# Patient Record
Sex: FEMALE | Race: BLACK OR AFRICAN AMERICAN | NOT HISPANIC OR LATINO | URBAN - METROPOLITAN AREA
[De-identification: names, ages, dates, MRNs, and addresses within clinical notes are randomized per-mention and may not be internally consistent; named-entity substitution may affect disease eponyms.]

---

## 2021-03-26 ENCOUNTER — EMERGENCY (EMERGENCY)
Facility: HOSPITAL | Age: 51
LOS: 1 days | Discharge: HOME | End: 2021-03-26
Attending: STUDENT IN AN ORGANIZED HEALTH CARE EDUCATION/TRAINING PROGRAM
Payer: COMMERCIAL

## 2021-03-26 VITALS
RESPIRATION RATE: 16 BRPM | TEMPERATURE: 99 F | SYSTOLIC BLOOD PRESSURE: 163 MMHG | HEIGHT: 62 IN | DIASTOLIC BLOOD PRESSURE: 104 MMHG | WEIGHT: 130.07 LBS | OXYGEN SATURATION: 98 % | HEART RATE: 109 BPM

## 2021-03-26 LAB
ALBUMIN SERPL ELPH-MCNC: 4.1 G/DL — SIGNIFICANT CHANGE UP (ref 3.3–5)
ALP SERPL-CCNC: 107 U/L — SIGNIFICANT CHANGE UP (ref 40–120)
ALT FLD-CCNC: 16 U/L — SIGNIFICANT CHANGE UP (ref 10–45)
ANION GAP SERPL CALC-SCNC: 14 MMOL/L — SIGNIFICANT CHANGE UP (ref 5–17)
APPEARANCE UR: CLEAR — SIGNIFICANT CHANGE UP
AST SERPL-CCNC: 13 U/L — SIGNIFICANT CHANGE UP (ref 10–40)
B-OH-BUTYR SERPL-SCNC: 0.8 MMOL/L — HIGH
BACTERIA # UR AUTO: NEGATIVE — SIGNIFICANT CHANGE UP
BASOPHILS # BLD AUTO: 0.04 K/UL — SIGNIFICANT CHANGE UP (ref 0–0.2)
BASOPHILS NFR BLD AUTO: 0.9 % — SIGNIFICANT CHANGE UP (ref 0–2)
BILIRUB SERPL-MCNC: 0.3 MG/DL — SIGNIFICANT CHANGE UP (ref 0.2–1.2)
BILIRUB UR-MCNC: NEGATIVE — SIGNIFICANT CHANGE UP
BUN SERPL-MCNC: 21 MG/DL — SIGNIFICANT CHANGE UP (ref 7–23)
CALCIUM SERPL-MCNC: 10 MG/DL — SIGNIFICANT CHANGE UP (ref 8.4–10.5)
CHLORIDE SERPL-SCNC: 99 MMOL/L — SIGNIFICANT CHANGE UP (ref 96–108)
CO2 SERPL-SCNC: 24 MMOL/L — SIGNIFICANT CHANGE UP (ref 22–31)
COLOR SPEC: SIGNIFICANT CHANGE UP
CREAT SERPL-MCNC: 0.7 MG/DL — SIGNIFICANT CHANGE UP (ref 0.5–1.3)
DIFF PNL FLD: NEGATIVE — SIGNIFICANT CHANGE UP
EOSINOPHIL # BLD AUTO: 0.02 K/UL — SIGNIFICANT CHANGE UP (ref 0–0.5)
EOSINOPHIL NFR BLD AUTO: 0.5 % — SIGNIFICANT CHANGE UP (ref 0–6)
EPI CELLS # UR: 1 /HPF — SIGNIFICANT CHANGE UP
GAS PNL BLDV: SIGNIFICANT CHANGE UP
GLUCOSE SERPL-MCNC: 450 MG/DL — CRITICAL HIGH (ref 70–99)
GLUCOSE UR QL: ABNORMAL
HCT VFR BLD CALC: 38.6 % — SIGNIFICANT CHANGE UP (ref 34.5–45)
HGB BLD-MCNC: 13.2 G/DL — SIGNIFICANT CHANGE UP (ref 11.5–15.5)
HYALINE CASTS # UR AUTO: 0 /LPF — SIGNIFICANT CHANGE UP (ref 0–2)
KETONES UR-MCNC: ABNORMAL
LEUKOCYTE ESTERASE UR-ACNC: NEGATIVE — SIGNIFICANT CHANGE UP
LYMPHOCYTES # BLD AUTO: 2 K/UL — SIGNIFICANT CHANGE UP (ref 1–3.3)
LYMPHOCYTES # BLD AUTO: 45.6 % — HIGH (ref 13–44)
MCHC RBC-ENTMCNC: 29.3 PG — SIGNIFICANT CHANGE UP (ref 27–34)
MCHC RBC-ENTMCNC: 34.2 GM/DL — SIGNIFICANT CHANGE UP (ref 32–36)
MCV RBC AUTO: 85.8 FL — SIGNIFICANT CHANGE UP (ref 80–100)
MONOCYTES # BLD AUTO: 0.25 K/UL — SIGNIFICANT CHANGE UP (ref 0–0.9)
MONOCYTES NFR BLD AUTO: 5.7 % — SIGNIFICANT CHANGE UP (ref 2–14)
NEUTROPHILS # BLD AUTO: 2.08 K/UL — SIGNIFICANT CHANGE UP (ref 1.8–7.4)
NEUTROPHILS NFR BLD AUTO: 47.3 % — SIGNIFICANT CHANGE UP (ref 43–77)
NITRITE UR-MCNC: NEGATIVE — SIGNIFICANT CHANGE UP
NRBC # BLD: 0 /100 WBCS — SIGNIFICANT CHANGE UP (ref 0–0)
PH UR: 6 — SIGNIFICANT CHANGE UP (ref 5–8)
PLATELET # BLD AUTO: 307 K/UL — SIGNIFICANT CHANGE UP (ref 150–400)
POTASSIUM SERPL-MCNC: 4.2 MMOL/L — SIGNIFICANT CHANGE UP (ref 3.5–5.3)
POTASSIUM SERPL-SCNC: 4.2 MMOL/L — SIGNIFICANT CHANGE UP (ref 3.5–5.3)
PROT SERPL-MCNC: 7.5 G/DL — SIGNIFICANT CHANGE UP (ref 6–8.3)
PROT UR-MCNC: ABNORMAL
RBC # BLD: 4.5 M/UL — SIGNIFICANT CHANGE UP (ref 3.8–5.2)
RBC # FLD: 11.4 % — SIGNIFICANT CHANGE UP (ref 10.3–14.5)
RBC CASTS # UR COMP ASSIST: 1 /HPF — SIGNIFICANT CHANGE UP (ref 0–4)
SARS-COV-2 RNA SPEC QL NAA+PROBE: SIGNIFICANT CHANGE UP
SODIUM SERPL-SCNC: 137 MMOL/L — SIGNIFICANT CHANGE UP (ref 135–145)
SP GR SPEC: 1.04 — HIGH (ref 1.01–1.02)
UROBILINOGEN FLD QL: NEGATIVE — SIGNIFICANT CHANGE UP
WBC # BLD: 4.39 K/UL — SIGNIFICANT CHANGE UP (ref 3.8–10.5)
WBC # FLD AUTO: 4.39 K/UL — SIGNIFICANT CHANGE UP (ref 3.8–10.5)
WBC UR QL: 3 /HPF — SIGNIFICANT CHANGE UP (ref 0–5)

## 2021-03-26 PROCEDURE — 93010 ELECTROCARDIOGRAM REPORT: CPT

## 2021-03-26 PROCEDURE — 99220: CPT

## 2021-03-26 PROCEDURE — 99285 EMERGENCY DEPT VISIT HI MDM: CPT | Mod: GC

## 2021-03-26 PROCEDURE — 71045 X-RAY EXAM CHEST 1 VIEW: CPT | Mod: 26

## 2021-03-26 RX ORDER — GABAPENTIN 400 MG/1
100 CAPSULE ORAL ONCE
Refills: 0 | Status: COMPLETED | OUTPATIENT
Start: 2021-03-26 | End: 2021-03-26

## 2021-03-26 RX ORDER — SODIUM CHLORIDE 9 MG/ML
1000 INJECTION, SOLUTION INTRAVENOUS
Refills: 0 | Status: DISCONTINUED | OUTPATIENT
Start: 2021-03-26 | End: 2021-03-30

## 2021-03-26 RX ORDER — SODIUM CHLORIDE 9 MG/ML
1000 INJECTION INTRAMUSCULAR; INTRAVENOUS; SUBCUTANEOUS
Refills: 0 | Status: DISCONTINUED | OUTPATIENT
Start: 2021-03-26 | End: 2021-03-30

## 2021-03-26 RX ORDER — DEXTROSE 50 % IN WATER 50 %
12.5 SYRINGE (ML) INTRAVENOUS ONCE
Refills: 0 | Status: DISCONTINUED | OUTPATIENT
Start: 2021-03-26 | End: 2021-03-30

## 2021-03-26 RX ORDER — SODIUM CHLORIDE 9 MG/ML
1000 INJECTION INTRAMUSCULAR; INTRAVENOUS; SUBCUTANEOUS ONCE
Refills: 0 | Status: COMPLETED | OUTPATIENT
Start: 2021-03-26 | End: 2021-03-26

## 2021-03-26 RX ORDER — INSULIN GLARGINE 100 [IU]/ML
12 INJECTION, SOLUTION SUBCUTANEOUS AT BEDTIME
Refills: 0 | Status: DISCONTINUED | OUTPATIENT
Start: 2021-03-26 | End: 2021-03-30

## 2021-03-26 RX ORDER — INSULIN LISPRO 100/ML
VIAL (ML) SUBCUTANEOUS
Refills: 0 | Status: DISCONTINUED | OUTPATIENT
Start: 2021-03-26 | End: 2021-03-30

## 2021-03-26 RX ORDER — DEXTROSE 50 % IN WATER 50 %
25 SYRINGE (ML) INTRAVENOUS ONCE
Refills: 0 | Status: DISCONTINUED | OUTPATIENT
Start: 2021-03-26 | End: 2021-03-30

## 2021-03-26 RX ORDER — DEXTROSE 50 % IN WATER 50 %
15 SYRINGE (ML) INTRAVENOUS ONCE
Refills: 0 | Status: DISCONTINUED | OUTPATIENT
Start: 2021-03-26 | End: 2021-03-30

## 2021-03-26 RX ORDER — INSULIN LISPRO 100/ML
4 VIAL (ML) SUBCUTANEOUS
Refills: 0 | Status: DISCONTINUED | OUTPATIENT
Start: 2021-03-26 | End: 2021-03-30

## 2021-03-26 RX ORDER — GLUCAGON INJECTION, SOLUTION 0.5 MG/.1ML
1 INJECTION, SOLUTION SUBCUTANEOUS ONCE
Refills: 0 | Status: DISCONTINUED | OUTPATIENT
Start: 2021-03-26 | End: 2021-03-30

## 2021-03-26 RX ADMIN — GABAPENTIN 100 MILLIGRAM(S): 400 CAPSULE ORAL at 22:42

## 2021-03-26 RX ADMIN — Medication 4 UNIT(S): at 23:54

## 2021-03-26 RX ADMIN — SODIUM CHLORIDE 1000 MILLILITER(S): 9 INJECTION INTRAMUSCULAR; INTRAVENOUS; SUBCUTANEOUS at 21:30

## 2021-03-26 RX ADMIN — SODIUM CHLORIDE 100 MILLILITER(S): 9 INJECTION INTRAMUSCULAR; INTRAVENOUS; SUBCUTANEOUS at 23:29

## 2021-03-26 RX ADMIN — INSULIN GLARGINE 12 UNIT(S): 100 INJECTION, SOLUTION SUBCUTANEOUS at 23:53

## 2021-03-26 NOTE — ED ADULT NURSE NOTE - NSIMPLEMENTINTERV_GEN_ALL_ED
Implemented All Universal Safety Interventions:  [place] to call system. Call bell, personal items and telephone within reach. Instruct patient to call for assistance. Room bathroom lighting operational. Non-slip footwear when patient is off stretcher. Physically safe environment: no spills, clutter or unnecessary equipment. Stretcher in lowest position, wheels locked, appropriate side rails in place.

## 2021-03-26 NOTE — ED ADULT TRIAGE NOTE - WEIGHT IN KG
+insomnia x 3 months s/p new job working 3 different shifts at previous job and awakens throughout night.      crampy pelvic pain x 2 months, today, weekly, can last 2 mins, left side.   No other associated sx or alleviating/exacerbating sx.     PT WANTS to start BCP.  Was on depo in the past.     Vaginal dischArge white & creamy x 3 wks, + odor.  monog x 2 yrs.  Patient's last menstrual period was 06/25/2017 (exact date). NO f/c OR OTHER gi/gu SX.        Alma Delia is here at the office today for a physical examination.   She   is here to discuss:   1. Annual physical exam    2. Pelvic pain    3. BV (bacterial vaginosis)    4. Encounter for prescription for nuvaring    5. Need for vaccination    6. Laboratory exam ordered as part of routine general medical examination    7. Screening for venereal disease (VD)    8. Screening for cervical cancer    .      Past Medical History:   Diagnosis Date   • Depo-Provera contraceptive status 1/31/2014   • Dysmenorrhea 1/31/2014   • Encounter for prescription for nuvaring 7/25/2017   • Excessive or frequent menstruation 1/31/2014   • Unspecified vitamin D deficiency 5/2/2014       Health Maintenance   Topic Date Due   • DTaP/Tdap/Td Vaccine (1 - Tdap) 10/23/2010   • Cervical Cancer Screening  01/31/2017   • Influenza Vaccine (1) 09/01/2017   • HPV (Female) Vaccine  Completed       Past Surgical History:   Procedure Laterality Date   • HERNIA REPAIR     • REMV EXT CANAL EXOSTOSIS  9/25/2014   • UMBILICAL HERNIA REPAIR         SOCIAL HISTORY:    Social History     Social History   • Marital status: Single     Spouse name: N/A   • Number of children: 0   • Years of education: econ deg:P     Occupational History   • [employer]     back to [place] to finish econ BA goal:!     Social History Main Topics   • Smoking status: Never Smoker   • Smokeless tobacco: Never Used   • Alcohol use Yes      Comment: social   • Drug use: No   • Sexual activity: Not Currently      Partners: Male     Birth control/ protection: Condom      Comment: start depo today      Other Topics Concern   • Not on file     Social History Narrative    back to [place] to finish econ BA goal:!       Family History   Problem Relation Age of Onset   • NEGATIVE FAMILY HX OF Mother    • Arthritis Father      rheumatoid       ALLERGIES:   Allergen Reactions   • Amoxicillin HIVES       Current Outpatient Prescriptions   Medication Sig   • metroNIDAZOLE (METROGEL-VAGINAL) 0.75 % vaginal gel Insert applicator full vaginally x 5 noc.   • etonogestrel-ethinyl estradiol (NUVARING) 0.12-0.015 MG/24HR vaginal ring insert and keep in for 4 weeks and replace with new nuvaring every 4 weeks x 3 total, allowing menses every 3 months and repeat     No current facility-administered medications for this visit.        COMPREHENSIVE REVIEW OF SYSTEMS:    No c/o breast/CP/SOB/abd. pain/hematuria.     PHYSICAL EXAMINATION:  VITAL SIGNS:   Visit Vitals  /68   Pulse 87   Ht 5' (1.524 m)   Wt 60.6 kg   LMP 06/25/2017 (Exact Date)   SpO2 100%   BMI 26.11 kg/m²     GENERAL: Well-developed, well-nourished female, in no acute distress. She is alert and oriented x 3 with a normal affect.   HEENT: Extraocular muscles are intact. Sclerae are anicteric. Conjunctivae are clear. Nares are without rhinorrhea. Mucous membranes are moist.  NECK: No cervical or submandibular or supraclavicular lymphadenopathy noted. There is no thyromegaly.  CARDIOVASCULAR: Regular rate and rhythm without murmurs, rubs or gallops.   LUNGS: Clear to auscultation bilaterally with good aeration and normal respiratory effort, without retractions appreciated.  ABDOMEN: Soft and nontender without hepatosplenomegaly, masses or hernia.   MUSCULOSKELETAL: All extremities have full range of motion with normal muscular strength bilaterally, without bony deformity of extremities with stability of ligaments appreciated. The patient has a normal gait as well as  station. Extremities are without clubbing, cyanosis or edema.   NEUROLOGICAL: Nonfocal.  SKIN: Without concerning lesions.  GYNECOLOGIC: No vulvar, vault or cervical lesions appreciated. Urethra is normal to inspection with a normal bladder capacity and tone. Uterus is not notably enlarged and adnexa are without tenderness to palpation and with PROMINENT HOMOGENEOUS WHITE  discharge. No malodorous vaginal discharge appreciated. NO CMT.    .  BREASTS: No skin lesions appreciated. No dimpling, retractions, masses or nipple discharge or lymphadenopathy.  RECTAL: No external masses.    1. Annual physical exam    2. Pelvic pain    3. BV (bacterial vaginosis)    4. Encounter for prescription for nuvaring    5. Need for vaccination    6. Laboratory exam ordered as part of routine general medical examination    7. Screening for venereal disease (VD)    8. Screening for cervical cancer          PLAN:  1.  MVI/adequate vitamin D3 2000IU daily and calcium 500 mg twice daily (dietary or supplement).   2.  Monthly breast self exam.   3.  Condoms prn  4.  Dental visit every 6 months or as directed.   5.  Optometry every 2 years or as directed.   6.  Healthy eating and at least 150 minutes of weekly exercise.  7.  Pap due TODAY.  8.  Return if symptoms worsen or fail to improve, for F/U NUVARING 8 WKS ..    Orders Placed This Encounter   • OFFICE/OUTPT VISIT EST LEVEL III   • TETANUS/DIPHTHERIA/ACELLULAR PERTUSSIS 11+ (ADACEL)   • Pregnancy Test Qualitative Serum   • Basic Metabolic Panel   • Hemoglobin and Hematocrit   • Cholesterol   • HDL   • SGOT   • SGPT   • TSH, reflex if abnormal   • UA with Micro & Culture if Indicated   • Wet Mount (75862)   • Chlamydia/GC by Amplified Probe - Cervical   • Hepatitis C Antibody (58864)   • Hepatitis B Surface Antigen   • RPR - (57594)   • HIV Antibody Screen   • US Pelvis Complete Non OB   • Thin Prep Pap Test with HPV Reflex   • metroNIDAZOLE (METROGEL-VAGINAL) 0.75 % vaginal gel   •  etonogestrel-ethinyl estradiol (NUVARING) 0.12-0.015 MG/24HR vaginal ring     start SED  BC options were discussed with her to include R/Bs of requested BC options.  Pt has elected to start Nuvaring  and she has no known contraindications and was informed of R/B/limitations, and all questions were answered.  She will f/u in 8    Please see After Visit Summary for details of the remaining discussion.     Patient is instructed to email me to discuss test results via [facility] [facility] 48 hours after the tests are done unless otherwise requested or call if not received results in 2 weeks of them being completed.     Continue medicines per Epic as applicable.    This is a CPE + additional 15 mins spent addressing patient's  chief complaint(s)/coordination of care with answering patient's questions in detail.   59

## 2021-03-26 NOTE — ED CDU PROVIDER INITIAL DAY NOTE - FAMILY HISTORY
Mother  Still living? Unknown  Family history of diabetes mellitus type I, Age at diagnosis: Age Unknown

## 2021-03-26 NOTE — ED CDU PROVIDER INITIAL DAY NOTE - PHYSICAL EXAMINATION
I have reviewed the triage vital signs.  Const: Well-nourished, Well-developed, appearing stated age  Head: atraumatic, normocephalic  Eyes: no conjunctival injection and no scleral icterus  ENMT: Atraumatic external nose and ears, dry mucus membranes  Neck: Symmetric, trachea midline,  CVS: +S1/S2, dorsalis pedis/radial pulse 2+ bilaterally  RESP: Unlabored respiratory effort, Clear to auscultation bilaterally  GI: Nontender/Nondistended, soft abdomen  MSK: Extremities w/o deformity or ttp   Skin: Warm, Dry w/ no rashes  Neuro: GCS=15,  motor in all 4 extremities equal, Sensation intact w/ burning pain in the bilateral feet,   Psych: Awake, Alert, & Orientedx3;  Appropriate mood and affect, cooperative

## 2021-03-26 NOTE — ED CDU PROVIDER INITIAL DAY NOTE - MEDICAL DECISION MAKING DETAILS
ap- burning pain in b/l feet likely c/w diabetic neuropathy.   DM1 per patient - noncompliant w/insulin due to insurance issues to cdu for endocrine consult and sw to assist w/ obtaining supplies ; labs not c/w  DKA,, hyperglycemia w/ ketosis

## 2021-03-26 NOTE — ED ADULT NURSE NOTE - OBJECTIVE STATEMENT
49 yo F pmh of DM, not controlled as per patient ambulated to ED c/o bilateral LE pain worse in the LLE from the hip down to the toes x 6 month getting worse more recently.  pt describes pain as a numb sensation.  Denies CP, back pain, SOB, fevers/chills, n/v/d, lightheadedness, dizziness, changes in urinary or bowel habits, no recent trauma or falls.  A&Ox4, gross neuro intact, +strength and sensation bilaterally in all extremities.  Skin w/d/i.  +peripheral pulses noted in all extremities.  No deformity noted.  Pt tender on palpation, no swelling noted.  Safety and comfort maintained.  Will continue to monitor.

## 2021-03-26 NOTE — ED CDU PROVIDER INITIAL DAY NOTE - PROGRESS NOTE DETAILS
CDU PROGRESS NOTE ONEIDA GALVAN: plan d/w endocrine fellow - recommended lantus 12@bedtime, 4u lispro TID and low-dose sliding scale. team will evaluate pt tomorrow.

## 2021-03-26 NOTE — ED PROVIDER NOTE - OBJECTIVE STATEMENT
50 F w/ hx of DM type 1 per the patient, borrowing someone's insulin due to lack of insurance presents to the ER w/ burning pain in the feet, no cp, no sob, no nausea no vomiting. no lightheadedness, no dizziness, no fevers, no chills, no cp 50 F w/ hx of DM type 1 per the patient, borrowing someone's insulin due to lack of insurance presents to the ER w/ burning pain in the feet, no cp, no sob, no nausea no vomiting. no lightheadedness, no dizziness, no fevers, no chills, no cp.   atraumatic foot pain reports a burning sensation in the foot, that radiates upward.

## 2021-03-26 NOTE — ED CDU PROVIDER INITIAL DAY NOTE - OBJECTIVE STATEMENT
50 F w/ hx of DM type 1 per the patient, borrowing someone's insulin due to lack of insurance presents to the ER w/ burning pain in the feet, no cp, no sob, no nausea no vomiting. no lightheadedness, no dizziness, no fevers, no chills, no cp.   atraumatic foot pain reports a burning sensation in the foot, that radiates upward.  In ED pt found to be hyperglycemic in the 400s, small ketones in ua. Not clinically DKA. Pt reports she does not have insurance but is in the process of getting a card for [employer]. has been using her friend's long-acting insulin (unsure which) for years. Pt went to CDU for hyperglycemia control, endocrine and SW consult, frequent evals

## 2021-03-26 NOTE — ED CDU PROVIDER INITIAL DAY NOTE - DETAILS
50y f p/w hyperglycemia:  -q4 FS/vitals, maintenance fluids and insulin regimen, endocrine/SW/nutritional consults    plan d/w Dr Portillo

## 2021-03-26 NOTE — ED PROVIDER NOTE - PHYSICAL EXAMINATION
I have reviewed the triage vital signs.  Const: Well-nourished, Well-developed, appearing stated age  Head: atraumatic, normocephalic  Eyes: no conjunctival injection and no scleral icterus  ENMT: Atraumatic external nose and ears, dry mucus membranes  Neck: Symmetric, trachea midline,  CVS: +S1/S2, dorsalis pedis/radial pulse 2+ bilaterally  RESP: Unlabored respiratory effort, Clear to auscultation bilaterally  GI: Nontender/Nondistended, soft abdomen  MSK: Extremities w/o deformity or ttp   Skin: Warm, Dry w/ no rashes  Neuro: GCS=15,  motor in all 4 extremities equal, Sensation grossly intact   Psych: Awake, Alert, & Orientedx3;  Appropriate mood and affect, cooperative I have reviewed the triage vital signs.  Const: Well-nourished, Well-developed, appearing stated age  Head: atraumatic, normocephalic  Eyes: no conjunctival injection and no scleral icterus  ENMT: Atraumatic external nose and ears, dry mucus membranes  Neck: Symmetric, trachea midline,  CVS: +S1/S2, dorsalis pedis/radial pulse 2+ bilaterally  RESP: Unlabored respiratory effort, Clear to auscultation bilaterally  GI: Nontender/Nondistended, soft abdomen  MSK: Extremities w/o deformity or ttp   Skin: Warm, Dry w/ no rashes  Neuro: GCS=15,  motor in all 4 extremities equal, Sensation intact w/ burning pain in the bilateral feet,   Psych: Awake, Alert, & Orientedx3;  Appropriate mood and affect, cooperative

## 2021-03-26 NOTE — ED PROVIDER NOTE - NS ED ROS FT
Constitutional: no fevers no chills.   CV: no chest pain, no palpitations   Respiratory: no shortness of breath, no cough   GI: no abdominal pain, no nausea no vomiting   Neuro: no headache, no weakness, no numbness  all other ROS is negative except as documented as HPI.

## 2021-03-26 NOTE — CHART NOTE - NSCHARTNOTEFT_GEN_A_CORE
51 y/o W w/ hx of T1DM with no insurance, has been sometimes using her friends insulin p/w b/l foot pain. Glucose 450 with AG 14, BHOB 0.8. Not in DKA but has ketosis.   -stat lantus 12 units now  -Admelog 4 units tidac with low dose sliding scale tidac and qhs ( please give first dose of sliding scale now)   -Nutrition consult   -a1c  -repeat bmp in AM   -Provider to RN vial syringe teaching   -given lack of insurance would be discharging on novolin 70/30 syringe vs pen.   -Will see patient tomorrow

## 2021-03-27 VITALS
OXYGEN SATURATION: 100 % | DIASTOLIC BLOOD PRESSURE: 75 MMHG | HEART RATE: 98 BPM | SYSTOLIC BLOOD PRESSURE: 149 MMHG | TEMPERATURE: 98 F | RESPIRATION RATE: 18 BRPM

## 2021-03-27 DIAGNOSIS — E10.65 TYPE 1 DIABETES MELLITUS WITH HYPERGLYCEMIA: ICD-10-CM

## 2021-03-27 DIAGNOSIS — E78.49 OTHER HYPERLIPIDEMIA: ICD-10-CM

## 2021-03-27 DIAGNOSIS — I10 ESSENTIAL (PRIMARY) HYPERTENSION: ICD-10-CM

## 2021-03-27 LAB
CHOLEST SERPL-MCNC: 216 MG/DL — HIGH
CULTURE RESULTS: SIGNIFICANT CHANGE UP
HDLC SERPL-MCNC: 63 MG/DL — SIGNIFICANT CHANGE UP
LIPID PNL WITH DIRECT LDL SERPL: 138 MG/DL — HIGH
NON HDL CHOLESTEROL: 153 MG/DL — HIGH
SPECIMEN SOURCE: SIGNIFICANT CHANGE UP
TRIGL SERPL-MCNC: 76 MG/DL — SIGNIFICANT CHANGE UP

## 2021-03-27 PROCEDURE — 87086 URINE CULTURE/COLONY COUNT: CPT

## 2021-03-27 PROCEDURE — 82947 ASSAY GLUCOSE BLOOD QUANT: CPT

## 2021-03-27 PROCEDURE — G0378: CPT

## 2021-03-27 PROCEDURE — 99283 EMERGENCY DEPT VISIT LOW MDM: CPT | Mod: 25

## 2021-03-27 PROCEDURE — 82435 ASSAY OF BLOOD CHLORIDE: CPT

## 2021-03-27 PROCEDURE — 84132 ASSAY OF SERUM POTASSIUM: CPT

## 2021-03-27 PROCEDURE — 82010 KETONE BODYS QUAN: CPT

## 2021-03-27 PROCEDURE — 81001 URINALYSIS AUTO W/SCOPE: CPT

## 2021-03-27 PROCEDURE — 80061 LIPID PANEL: CPT

## 2021-03-27 PROCEDURE — 85025 COMPLETE CBC W/AUTO DIFF WBC: CPT

## 2021-03-27 PROCEDURE — 85014 HEMATOCRIT: CPT

## 2021-03-27 PROCEDURE — 82803 BLOOD GASES ANY COMBINATION: CPT

## 2021-03-27 PROCEDURE — 83605 ASSAY OF LACTIC ACID: CPT

## 2021-03-27 PROCEDURE — 71045 X-RAY EXAM CHEST 1 VIEW: CPT

## 2021-03-27 PROCEDURE — 80053 COMPREHEN METABOLIC PANEL: CPT

## 2021-03-27 PROCEDURE — 93005 ELECTROCARDIOGRAM TRACING: CPT

## 2021-03-27 PROCEDURE — 83036 HEMOGLOBIN GLYCOSYLATED A1C: CPT

## 2021-03-27 PROCEDURE — 82962 GLUCOSE BLOOD TEST: CPT

## 2021-03-27 PROCEDURE — 84295 ASSAY OF SERUM SODIUM: CPT

## 2021-03-27 PROCEDURE — 99217: CPT

## 2021-03-27 PROCEDURE — 82330 ASSAY OF CALCIUM: CPT

## 2021-03-27 PROCEDURE — U0003: CPT

## 2021-03-27 PROCEDURE — 36000 PLACE NEEDLE IN VEIN: CPT

## 2021-03-27 PROCEDURE — 85018 HEMOGLOBIN: CPT

## 2021-03-27 RX ORDER — INSULIN NPH HUM/REG INSULIN HM 70-30/ML
14 VIAL (ML) SUBCUTANEOUS
Qty: 9 | Refills: 0
Start: 2021-03-27

## 2021-03-27 RX ORDER — INSULIN NPH HUM/REG INSULIN HM 70-30/ML
12 VIAL (ML) SUBCUTANEOUS
Qty: 600 | Refills: 0
Start: 2021-03-27

## 2021-03-27 RX ORDER — ATORVASTATIN CALCIUM 80 MG/1
1 TABLET, FILM COATED ORAL
Qty: 30 | Refills: 0
Start: 2021-03-27 | End: 2021-04-25

## 2021-03-27 RX ADMIN — Medication 4 UNIT(S): at 09:11

## 2021-03-27 RX ADMIN — Medication 4 UNIT(S): at 13:30

## 2021-03-27 RX ADMIN — Medication 1: at 09:12

## 2021-03-27 RX ADMIN — Medication 3: at 13:29

## 2021-03-27 NOTE — ED CDU PROVIDER SUBSEQUENT DAY NOTE - PHYSICAL EXAMINATION
Const: Well-nourished, Well-developed, appearing stated age  Head: atraumatic, normocephalic  Eyes: no conjunctival injection and no scleral icterus  ENMT: Atraumatic external nose and ears, dry mucus membranes  Neck: Symmetric, trachea midline,  CVS: +S1/S2, dorsalis pedis/radial pulse 2+ bilaterally  RESP: Unlabored respiratory effort, Clear to auscultation bilaterally  GI: Nontender/Nondistended, soft abdomen  MSK: Extremities w/o deformity or ttp   Skin: Warm, Dry w/ no rashes  Neuro: GCS=15,  motor in all 4 extremities equal, Sensation intact w/ burning pain in the bilateral feet,   Psych: Awake, Alert, & Orientedx3;  Appropriate mood and affect, cooperative

## 2021-03-27 NOTE — ED CDU PROVIDER SUBSEQUENT DAY NOTE - HISTORY
CDU PROGRESS NOTE ONEIDA GALVAN: No interval change. pt resting comfortably without complaint. Reports foot pain improved after gabapentin. NAD. VSS. last  prior to receiving long and short-acting insulin. Will continue to monitor.

## 2021-03-27 NOTE — ED CDU PROVIDER DISPOSITION NOTE - PATIENT PORTAL LINK FT
You can access the FollowMyHealth Patient Portal offered by [facility] by registering at the following website: [email or web address]. By joining [facility]’s FollowMyHealth portal, you will also be able to view your health information using other applications (apps) compatible with our system.

## 2021-03-27 NOTE — CONSULT NOTE ADULT - PROBLEM SELECTOR RECOMMENDATION 3
if remains uncontrolled would consider starting bp agent such as lisinopril 5 mg qday if remains uncontrolled would consider starting bp agent such as lisinopril 5 mg qday. mamagement as per CDU

## 2021-03-27 NOTE — ED CDU PROVIDER DISPOSITION NOTE - NSFOLLOWUPINSTRUCTIONS_ED_ALL_ED_FT
Please follow up with your primary care doctor within 1 week.  Follow up with Endocrinologist  __ within 1 week.    Take home insulin as prescribed for your Diabetes. Check finger sticks regularly.     Take gabapentin as prescribed for your neuropathy.     Return to the ED for worsening pain, nausea/vomiting, or any other concerns. 1. Stay hydrated. encourage diet and exercise.   2. Check finger sticks glucose levels 3x/day (definitely before breakfast and dinner), follow the following diabetic medication regimen:  Novolin 70/30 at 12 units with breakfast and 8 units with Dinner.  (keep log of Fingerstick glucose levels)  Start Lipitor 20mg daily.   3. Follow up with your PCP or Medicine clinic #[phone] in 2 days. Follow up with Endocrionologist Dr. Shepard at:  [phone]  [address] [facility] [address], [place] [address].  4. Return if symptoms worsen, fever, weakness, dizziness, and all other concerns.          Diabetic Hyperglycemia    WHAT YOU NEED TO KNOW:    Diabetic hyperglycemia is a blood glucose (sugar) level that is higher than your diabetes care team provider recommends. You may have increased thirst and urinate more often than usual.     DISCHARGE INSTRUCTIONS:    Call 911 for any of the following:   •You have a seizure.       •You begin to breathe fast or are short of breath.       •You become weak and confused.       Return to the emergency department if:   •Your blood sugar level is over 240 mg/dL and you have ketones in your urine.      •Your breath smells fruity.       •You have nausea and are vomiting.       •You have symptoms of dehydration, such as dark yellow urine, dry mouth and lips, and dry skin.       Call your care team provider if:   •You continue to have higher blood sugar levels than your care team provider recommends.      •You have questions or concerns about your condition or care.       Medicines:   •Medicines, such as insulin and diabetes pills, decrease blood sugar levels.       •Take your medicine as directed. Contact your healthcare provider if you think your medicine is not helping or if you have side effects. Tell him or her if you are allergic to any medicine. Keep a list of the medicines, vitamins, and herbs you take. Include the amounts, and when and why you take them. Bring the list or the pill bottles to follow-up visits. Carry your medicine list with you in case of an emergency.      Manage diabetic hyperglycemia:   •If you take diabetes medicine or insulin, take it as directed. Missed or wrong doses can cause your blood sugar to go up.      •Tell your care team provider if you continue to have trouble managing your blood sugar. He or she may change the type, amount, or timing of your diabetes medicine or insulin. If you do not take diabetes medicine or insulin, you may need to start.       •Work with your care team provider to develop a sick day plan. Illness can cause your blood sugar to rise. A sick day plan helps you control your blood sugar level when you are sick.       Prevent diabetic hyperglycemia:   •Check your blood sugar levels regularly. Ask your care team provider how often to check your blood sugar and what your levels should be.       •Follow your meal plan. Your blood sugar can go up if you eat a large meal or you eat more carbohydrates than recommended. Work with a dietitian to develop a meal plan that is right for you.       •Exercise as directed. Physical activity, such as exercise, can help lower your blood sugar when it is high. It can also keep your blood sugar levels steady over time. Be active for at least 30 minutes, 5 days a week. Include muscle strengthening activities 2 days each week. Do not sit for longer than 30 minutes at a time. Work with your care team provider to create an activity plan. Children should get at least 60 minutes of physical activity each day.       •Check your ketones before exercise if your blood sugar level is above 240 mg/dL. Do not exercise if you have ketones in your urine because your blood sugar level may rise even more. Ask your healthcare provider how to lower your blood sugar when you have ketones.       Follow up with your care team provider as directed: Your care team provider may refer you to a dietitian. He or she can help you manage your blood sugar levels. Write down your questions so you remember to ask them during your visits.        © Copyright [employer] 2021           back to top                          © Copyright [employer] 2021 1. Stay hydrated. encourage diet and exercise.   2. Check finger sticks glucose levels 3x/day (definitely before breakfast and dinner), follow the following diabetic medication regimen:  Novolin 70/30 at 14 units with breakfast and 10 units with Dinner.  (keep log of Fingerstick glucose levels)  Start Lipitor 20mg daily.   3. Follow up with your PCP or Medicine clinic #[phone] in 2 days. Follow up with Endocrionologist Dr. Shepard at:  [phone]  [address] [facility] [address], [place] [address].  4. Return if symptoms worsen, fever, weakness, dizziness, and all other concerns.          Diabetic Hyperglycemia    WHAT YOU NEED TO KNOW:    Diabetic hyperglycemia is a blood glucose (sugar) level that is higher than your diabetes care team provider recommends. You may have increased thirst and urinate more often than usual.     DISCHARGE INSTRUCTIONS:    Call 911 for any of the following:   •You have a seizure.       •You begin to breathe fast or are short of breath.       •You become weak and confused.       Return to the emergency department if:   •Your blood sugar level is over 240 mg/dL and you have ketones in your urine.      •Your breath smells fruity.       •You have nausea and are vomiting.       •You have symptoms of dehydration, such as dark yellow urine, dry mouth and lips, and dry skin.       Call your care team provider if:   •You continue to have higher blood sugar levels than your care team provider recommends.      •You have questions or concerns about your condition or care.       Medicines:   •Medicines, such as insulin and diabetes pills, decrease blood sugar levels.       •Take your medicine as directed. Contact your healthcare provider if you think your medicine is not helping or if you have side effects. Tell him or her if you are allergic to any medicine. Keep a list of the medicines, vitamins, and herbs you take. Include the amounts, and when and why you take them. Bring the list or the pill bottles to follow-up visits. Carry your medicine list with you in case of an emergency.      Manage diabetic hyperglycemia:   •If you take diabetes medicine or insulin, take it as directed. Missed or wrong doses can cause your blood sugar to go up.      •Tell your care team provider if you continue to have trouble managing your blood sugar. He or she may change the type, amount, or timing of your diabetes medicine or insulin. If you do not take diabetes medicine or insulin, you may need to start.       •Work with your care team provider to develop a sick day plan. Illness can cause your blood sugar to rise. A sick day plan helps you control your blood sugar level when you are sick.       Prevent diabetic hyperglycemia:   •Check your blood sugar levels regularly. Ask your care team provider how often to check your blood sugar and what your levels should be.       •Follow your meal plan. Your blood sugar can go up if you eat a large meal or you eat more carbohydrates than recommended. Work with a dietitian to develop a meal plan that is right for you.       •Exercise as directed. Physical activity, such as exercise, can help lower your blood sugar when it is high. It can also keep your blood sugar levels steady over time. Be active for at least 30 minutes, 5 days a week. Include muscle strengthening activities 2 days each week. Do not sit for longer than 30 minutes at a time. Work with your care team provider to create an activity plan. Children should get at least 60 minutes of physical activity each day.       •Check your ketones before exercise if your blood sugar level is above 240 mg/dL. Do not exercise if you have ketones in your urine because your blood sugar level may rise even more. Ask your healthcare provider how to lower your blood sugar when you have ketones.       Follow up with your care team provider as directed: Your care team provider may refer you to a dietitian. He or she can help you manage your blood sugar levels. Write down your questions so you remember to ask them during your visits.        © Copyright [employer] 2021           back to top                          © Copyright [employer] 2021

## 2021-03-27 NOTE — ED CDU PROVIDER DISPOSITION NOTE - CARE PROVIDER_API CALL
Gee Alvarez)  EndocrinologyMetabDiabetes; Internal Medicine  [address], [address] [place] [address]  Phone: [phone]  Fax: [phone]  Follow Up Time:

## 2021-03-27 NOTE — CONSULT NOTE ADULT - ASSESSMENT
51 y/o W w/ ?T1DM and no insurance presents with hyperglycemia.    51 y/o W w/ ?T1DM and no insurance presents with hyperglycemia   pt does not take part in meaningful hx   a1c unknown

## 2021-03-27 NOTE — ED CDU PROVIDER SUBSEQUENT DAY NOTE - PROGRESS NOTE DETAILS
CDU PROGRESS NOTE ONEIDA GALVAN: pt resting comfortably without complaint. NAD. VSS. last . pending endoJOSEPH. Will continue to monitor. CDU NOTE ONEIDA Mendoza: pt resting comfortably, feels well without complaint. NAD VSS. FS improving. Endo consult today. pt c/o burning feet. no lightheadedness.  Glu decrease to 178.  pt awaiting Endocrine final recs.  pt is stable for d/c. CDU NOTE ONEIDA Mendoza: pt seen by Endo- recommend Novolin 70/30 at 12 units with breakfast and 8 units with dinner. diabetic supplies. check sugars 3x/day. start lipitor 20mg daily. f/up outpt with Dr. Alvarez.   as per Dr. Carmona, pt stable for d/c home. CDU NOTE ONEIDA Mendoza: spoke with Endocrine- change dosage of insulin as patients FS high for lunch. change to Novolin 70/30 14 units at breakfast and 10 units and dinner. CDU NOTE ONEIDA Mendoza: spoke with Endocrine- change dosage of insulin as patients FS high for lunch. change to Novolin 70/30 14 units at breakfast and 10 units and dinner.  pt had thorough diabetic teaching by Nurse with pt run through - per nurse pt did well and understands how to draw up and administer the insulin as well as finger sticks and the rest of the teaching. I asked pt if she felt comfortable and if she saw any barrier to getting the insulin and any issue with taking her insulin as prescribed, pt states she feels comfortable and understands and feels safe to go home.

## 2021-03-27 NOTE — ED ADULT NURSE REASSESSMENT NOTE - NS ED NURSE REASSESS COMMENT FT1
Report taken from Silvia DAVIDSON. States patient had a good night with no complaints. Will continue to monitor.
Pt received from LETY Bradley. Pt oriented to CDU & plan of care was discussed. Pt A&O x 4. Pt in CDU for q4 FS/vitals, maintenance fluids and insulin regimen, endocrine/SW/nutritional consults. Pt c/o discomfort in lower extremities, declines any medications at this time. Pt denies any polyuria, polydipsia, polyphagia, dizziness, diaphoresis as of now. Pt  given 12 units of Lantus and 4 units of Admelog as per MAR, and ONEIDA Presley.  V/S stable, pt afebrile,  IV in place, patent and free of signs of infiltration. Pt resting in bed. Safety & comfort measures maintained. Call bell in reach. Will continue to monitor.

## 2021-03-27 NOTE — CONSULT NOTE ADULT - PROBLEM SELECTOR RECOMMENDATION 9
Questionable dx of T1DM given patient has 1 month without insulin at times.   -check c-peptide, EBEN ab, islet, ab and zinc transporter ab if staying inpatient  -continue lantus 12 units qhs   -continue admelog 4 units tidac with low dose correcton scale tidac and qhs   -Nutrition consult   -Insulin syringe teaching     Discharge  Novolin 70/30 syringe 12 units with breakfast and 8 units with dinner   Prescription for insulin supplies glucometer, insulin syringe, test strips, lancets, alcohol pads.   [phone]  [address] [facility] [address], [place] [id number] Questionable dx of T1DM given patient has 1 month without insulin at times.   -check c-peptide, EBEN ab, islet, ab and zinc transporter ab if staying inpatient  -continue lantus 12 units qhs   -continue admelog 4 units tidac with low dose correcton scale tidac and qhs   -Nutrition consult   -Insulin syringe teaching     Discharge  Novolin 70/30 syringe 14 units with breakfast and 10 units with dinner   Prescription for insulin supplies glucometer, insulin syringe, test strips, lancets, alcohol pads.   [phone]  [address] [facility] [address] Floor, [place] [id number] Questionable dx of T1DM given patient has 1 month without insulin at times.   -check c-peptide, EBEN ab, islet, ab and zinc transporter ab if staying inpatient  -increase lantus to 15 units qhs   -increase admelog to 5 units tidac with low dose correcton scale tidac and qhs   -Nutrition consult   -Insulin syringe teaching     Discharge  Patient also sad on examination with no suicidal ideation. Advised therapist outpatient f/u. May also have  see patient in ED if patient agrees and available.   Novolin 70/30 syringe 14 units with breakfast and 10 units with dinner   Prescription for insulin supplies glucometer, insulin syringe, test strips, lancets, alcohol pads.  Dr. Alvarez    [phone]  [address] [facility] [address], [place] [address] Questionable dx of T1DM given patient has 1 month without insulin at times.   please get a1c to help with dc planning.  -check c-peptide, EBEN ab, islet, ab and zinc transporter ab if staying inpatient    -increase lantus to 15 units qhs   -increase admelog to 5 units tidac with low dose correcton scale tidac and qhs   -Nutrition consult   -Insulin syringe teaching     Discharge   please make sure pt has glucometer and insulin teaching with teachback prior to dc   Patient also sad/flat affect on examination with no suicidal ideation. Advised therapist outpatient f/u. given the concern that pt is not able to take part in a meaninfgul hx or repeat back insulin dosing/plan please have  see patient in ED if patient agrees and available to help with safe dc plan     if basal bolus is not covered will need to likely dc on Novolin 70/30 syringe 14 units with breakfast and 10 units with dinner   Prescription for insulin supplies glucometer, insulin syringe, test strips, lancets, alcohol pads.  please make sure pt has a safe dc plan in place prior to dc with PCP and endocrine appts, also should see optho  Dr. Alvarez    [phone]  [address] [facility] [address], [place] [address]

## 2021-03-27 NOTE — CONSULT NOTE ADULT - SUBJECTIVE AND OBJECTIVE BOX
HPI: 49 y/o W w/ ?T1DM and no insurance presents with hyperglycemia.     Endocrine History:  Endorses previously following at [facility] endocrinology and was dx with T1DM 15-16 years ago. However due to insurance issues she has not followed with endocrinologist for years and only been taking her friends insulin once in a while. Last dose of unknown insulin that she takes once a day was a week ago. She usually takes 10-20 units depending on glucose. 20 units if FS >400 and 10 units if closer to 100. No DKA in the past. No hypoglycemic events. No known complications of diabetes.       PAST MEDICAL & SURGICAL HISTORY:  No pertinent past medical history    No significant past surgical history        FAMILY HISTORY:  Family history of diabetes mellitus type I (Mother)        Social History: No history of tobacco, etoh or illicit drug use.     Outpatient Medications: Home Medications:      MEDICATIONS  (STANDING):  dextrose 40% Gel 15 Gram(s) Oral once  dextrose 5%. 1000 milliLiter(s) (50 mL/Hr) IV Continuous <Continuous>  dextrose 5%. 1000 milliLiter(s) (100 mL/Hr) IV Continuous <Continuous>  dextrose 50% Injectable 25 Gram(s) IV Push once  dextrose 50% Injectable 12.5 Gram(s) IV Push once  dextrose 50% Injectable 25 Gram(s) IV Push once  glucagon  Injectable 1 milliGRAM(s) IntraMuscular once  insulin glargine Injectable (LANTUS) 12 Unit(s) SubCutaneous at bedtime  insulin lispro (ADMELOG) corrective regimen sliding scale   SubCutaneous three times a day before meals  insulin lispro Injectable (ADMELOG) 4 Unit(s) SubCutaneous three times a day before meals  sodium chloride 0.9%. 1000 milliLiter(s) (100 mL/Hr) IV Continuous <Continuous>    MEDICATIONS  (PRN):      Allergies    No Known Allergies    Intolerances      Review of Systems:  Constitutional: No fever, chills   Neuro: No tremors, headache   Cardiovascular: No chest pain, palpitations  Respiratory: No SOB, no cough  GI: No nausea, vomiting, abdominal pain  : No dysuria, polyuria   Skin: no rash, ulcers   Psych: no depression, anxiety   Endocrine: no polyphagia, polydipsia     ALL OTHER SYSTEMS REVIEWED AND NEGATIVE        PHYSICAL EXAM:  VITALS: T(C): 36.8 (03-27-21 @ 11:52)  T(F): 98.3 (03-27-21 @ 11:52), Max: 98.9 (03-26-21 @ 17:24)  HR: 98 (03-27-21 @ 11:52) (75 - 109)  BP: 149/75 (03-27-21 @ 11:52) (126/85 - 163/104)  RR:  (16 - 18)  SpO2:  (98% - 100%)  Wt(kg): --  GENERAL: NAD, well-groomed, well-developed  EYES: No proptosis, anicteric  HEENT:  Atraumatic, Normocephalic, moist mucous membranes  THYROID: Normal size, no palpable nodules  RESPIRATORY: Clear to auscultation bilaterally; No rales, rhonchi, wheezing  CARDIOVASCULAR: Regular rate and rhythm; No murmurs  GI: Soft, nontender, non distended, normal bowel sounds  SKIN: Dry, intact, No rashes or lesions  NEURO: AOx3, moves all extremities spontaneously   PSYCH: Reactive affect, euthymic mood    POCT Blood Glucose.: 270 mg/dL (03-27-21 @ 13:27)  POCT Blood Glucose.: 178 mg/dL (03-27-21 @ 09:10)  POCT Blood Glucose.: 210 mg/dL (03-27-21 @ 03:24)  POCT Blood Glucose.: 368 mg/dL (03-26-21 @ 23:51)  POCT Blood Glucose.: 400 mg/dL (03-26-21 @ 20:57)                            13.2   4.39  )-----------( 307      ( 26 Mar 2021 21:20 )             38.6       03-26    137  |  99  |  21  ----------------------------<  450<HH>  4.2   |  24  |  0.70    EGFR if : 118  EGFR if non : 102    Ca    10.0      03-26    TPro  7.5  /  Alb  4.1  /  TBili  0.3  /  DBili  x   /  AST  13  /  ALT  16  /  AlkPhos  107  03-26      Thyroid Function Tests:                  Radiology:                HPI: 49 y/o W w/ ?T1DM and no insurance presents with hyperglycemia.     Endocrine History:  Endorses previously following at [facility] endocrinology and was dx with T1DM 15-16 years ago. However due to insurance issues she has not followed with endocrinologist for years and only been taking her friends insulin once in a while. Last dose of unknown insulin that she takes once a day was a week ago. She usually takes 10-20 units depending on glucose. 20 units if FS >400 and 10 units if closer to 100.   she can not state the name of the insulin she takes to the provider.  reports that her diet can be healthier and includes sweets in her diet as well    No DKA in the past. No hypoglycemic events. No known complications of diabetes.       PAST MEDICAL & SURGICAL HISTORY:  No pertinent past medical history    No significant past surgical history        FAMILY HISTORY:  Family history of diabetes mellitus type I (Mother)        Social History: No history of tobacco, etoh or illicit drug use.     Outpatient Medications: Home Medications:      MEDICATIONS  (STANDING):  dextrose 40% Gel 15 Gram(s) Oral once  dextrose 5%. 1000 milliLiter(s) (50 mL/Hr) IV Continuous <Continuous>  dextrose 5%. 1000 milliLiter(s) (100 mL/Hr) IV Continuous <Continuous>  dextrose 50% Injectable 25 Gram(s) IV Push once  dextrose 50% Injectable 12.5 Gram(s) IV Push once  dextrose 50% Injectable 25 Gram(s) IV Push once  glucagon  Injectable 1 milliGRAM(s) IntraMuscular once  insulin glargine Injectable (LANTUS) 12 Unit(s) SubCutaneous at bedtime  insulin lispro (ADMELOG) corrective regimen sliding scale   SubCutaneous three times a day before meals  insulin lispro Injectable (ADMELOG) 4 Unit(s) SubCutaneous three times a day before meals  sodium chloride 0.9%. 1000 milliLiter(s) (100 mL/Hr) IV Continuous <Continuous>    MEDICATIONS  (PRN):      Allergies    No Known Allergies    Intolerances      Review of Systems:  Constitutional: No fever, chills   Neuro: No tremors, headache   Cardiovascular: No chest pain, palpitations  Respiratory: No SOB, no cough  GI: No nausea, vomiting, abdominal pain  : No dysuria, polyuria   Skin: no rash, ulcers   Psych: no depression, anxiety   Endocrine: no polyphagia, polydipsia     ALL OTHER SYSTEMS REVIEWED AND NEGATIVE        PHYSICAL EXAM:  VITALS: T(C): 36.8 (03-27-21 @ 11:52)  T(F): 98.3 (03-27-21 @ 11:52), Max: 98.9 (03-26-21 @ 17:24)  HR: 98 (03-27-21 @ 11:52) (75 - 109)  BP: 149/75 (03-27-21 @ 11:52) (126/85 - 163/104)  RR:  (16 - 18)  SpO2:  (98% - 100%)  Wt(kg): --  GENERAL: NAD, well-groomed, well-developed  EYES: No proptosis, anicteric  HEENT:  Atraumatic, Normocephalic, moist mucous membranes  THYROID: Normal size, no palpable nodules  RESPIRATORY: Clear to auscultation bilaterally; No rales, rhonchi, wheezing  CARDIOVASCULAR: Regular rate and rhythm; No murmurs  GI: Soft, nontender, non distended, normal bowel sounds  SKIN: Dry, intact, No rashes or lesions  NEURO: AOx3, moves all extremities spontaneously   PSYCH: Reactive affect, euthymic mood    POCT Blood Glucose.: 270 mg/dL (03-27-21 @ 13:27)  POCT Blood Glucose.: 178 mg/dL (03-27-21 @ 09:10)  POCT Blood Glucose.: 210 mg/dL (03-27-21 @ 03:24)  POCT Blood Glucose.: 368 mg/dL (03-26-21 @ 23:51)  POCT Blood Glucose.: 400 mg/dL (03-26-21 @ 20:57)                            13.2   4.39  )-----------( 307      ( 26 Mar 2021 21:20 )             38.6       03-26    137  |  99  |  21  ----------------------------<  450<HH>  4.2   |  24  |  0.70    EGFR if : 118  EGFR if non : 102    Ca    10.0      03-26    TPro  7.5  /  Alb  4.1  /  TBili  0.3  /  DBili  x   /  AST  13  /  ALT  16  /  AlkPhos  107  03-26      Thyroid Function Tests:                  Radiology:

## 2021-03-27 NOTE — ED CDU PROVIDER DISPOSITION NOTE - CLINICAL COURSE
50 F w/ hx of DM type 1 per the patient, borrowing someone's insulin due to lack of insurance presents to the ER w/ burning pain in the feet, no cp, no sob, no nausea no vomiting. no lightheadedness, no dizziness, no fevers, no chills, no cp.   	atraumatic foot pain reports a burning sensation in the foot, that radiates upward.  In ED pt found to be hyperglycemic in the 400s, small ketones in ua. Not clinically DKA. Pt reports she does not have insurance but is in the process of getting a card for [employer]. has been using her friend's long-acting insulin (unsure which) for years. Pt went to CDU for hyperglycemia control, endocrine and SW consult, frequent evals  In CDU, 50 F w/ hx of DM type 1 per the patient, borrowing someone's insulin due to lack of insurance presents to the ER w/ burning pain in the feet, no cp, no sob, no nausea no vomiting. no lightheadedness, no dizziness, no fevers, no chills, no cp.   	atraumatic foot pain reports a burning sensation in the foot, that radiates upward.  In ED pt found to be hyperglycemic in the 400s, small ketones in ua. Not clinically DKA. Pt reports she does not have insurance but is in the process of getting a card for [employer]. has been using her friend's long-acting insulin (unsure which) for years. Pt went to CDU for hyperglycemia control, endocrine and SW consult, frequent evals. In CDU, pt did well, FS improved, pt seen by Endocrinologist- ok to go home with following recs: novolin 70/30 at 12 units with breakfast and 8 units with dinner. FS 3x/day. lipitor 20mg daily. f/up outpt with Dr. Alvarez

## 2021-03-27 NOTE — CONSULT NOTE ADULT - TIME BILLING
Araceli Jeter MD  Attending Physician   Department of Endocrinology, Diabetes and Metabolism   Please note that this patient will be  followed by a different provider tomorrow.    please contact [phone].  For final dc reccomendations, please call [phone] or page the endocrine fellow on call.

## 2021-03-27 NOTE — CONSULT NOTE ADULT - ATTENDING COMMENTS
51 y/o W w/ ?T1DM and no insurance presents with hyperglycemia   pt does not take part in meaningful hx   a1c unknown     Plan in detail as documented above  please get a1c and please have SW see pt to assess safe dc planning   once a1c is back and insurance information is assessed will need to do dc planning with basal bolus vs 70/30  make sure insulin teaching is done inpt with teachback prior to dc   needs close outpt PCP, endo and optho followup 49 y/o W w/ ?T1DM and no insurance presents with hyperglycemia   pt does not take part in meaningful hx   a1c unknown     Plan in detail as documented above  please get a1c and please have SW see pt to assess safe dc planning   once a1c is back and insurance information is assessed will need to do dc planning with basal bolus vs 70/30  make sure insulin teaching is done inpt with teachback prior to dc   needs close outpt PCP, endo and optho followup  -Discussed with patient the importance of Carb consistent diet and exercise as tolerated.    -Recommend nutritional consultation  -Discussed glycemic goal of a1c <7% to prevent microvascular complications of diabetes mellitus and reduce the risk of macrovascular complications.     To prepare for dc:  - Make sure patient knows how to inject insulin and check fingersticks with glucometer (ask bedside RN for teaching)  - Discharge on premeal insulin and Lantus. do not dc on correction scale or bedtime scale.  - At home, Patient should check FSBG premeals and bedtime. Pt should call their doctor when FSBG <70 or above >400 and or consistently above 200s as changes in the regimen will have to be made.  -pt should call PMD for DM related questions or concerns until pt is seen by CDE or endocrine       DISCHARGE RX:  - Glucometer (ACCU_CHECK jennifer Conenct, Ascensia Contour Next EZ or One, Freestyle Freedome LITE or OneTouch Verio IQ)  - Glucometer test strips and lancets  (make sure compatible w glucometer), Dispense #100 (or #200) use as directed  -  BD elena 4mm pen needles  Please send Lantus solsotar pen and humalog kwikpen as test script to check insurance coverage.  Ok to send with current doses and update prior to d/c    Lantus Solostar Pen ___ units before bedtime, dispense 15ml  - if not covered- can try alternating with one of following   tresiba/basaglar/toujeo/Levemir    Humalog Flexpen up to ___ dispense 15ml- can try alternating with one of following  if not covered try alternative: novolog/apidra/admelog/fiasp    Patient will also need a glucometer, test strips, lancets, alcohol pads,   if basal bolus not covered as above will need to dc on 70/30 vial and syringe

## 2021-03-28 NOTE — ED POST DISCHARGE NOTE - DETAILS
spoke with patient went over results, given lipitor rx in ED, recommended PCP follow up - Nancy Hernandez PA-C 3/30/21: Attempted to contact pt regarding A1c level of 12.6. No answer, left voice message for pt to call back admin line. - VINCENT PinaC 3/31/21: Pt contacted and informed of A1C level. pt is a diabetic, has scheduled appt with new PMD. patient was given strict followup instructions and return precautions. pt verbalized understanding and was appreciative of call. - Fernandez Montemayor PA-C

## 2024-05-03 ENCOUNTER — INPATIENT (INPATIENT)
Facility: HOSPITAL | Age: 54
LOS: 13 days | Discharge: HOME | DRG: 64 | End: 2024-05-17
Attending: NEUROLOGICAL SURGERY | Admitting: NEUROLOGICAL SURGERY
Payer: COMMERCIAL

## 2024-05-03 VITALS
HEIGHT: 62 IN | WEIGHT: 149.91 LBS | RESPIRATION RATE: 16 BRPM | TEMPERATURE: 99 F | DIASTOLIC BLOOD PRESSURE: 92 MMHG | SYSTOLIC BLOOD PRESSURE: 152 MMHG | HEART RATE: 92 BPM

## 2024-05-03 DIAGNOSIS — R51.9 HEADACHE, UNSPECIFIED: ICD-10-CM

## 2024-05-03 LAB
ALBUMIN SERPL ELPH-MCNC: 3.6 G/DL — SIGNIFICANT CHANGE UP (ref 3.3–5)
ALP SERPL-CCNC: 157 U/L — HIGH (ref 40–120)
ALT FLD-CCNC: 22 U/L — SIGNIFICANT CHANGE UP (ref 10–45)
ANION GAP SERPL CALC-SCNC: 14 MMOL/L — SIGNIFICANT CHANGE UP (ref 5–17)
APPEARANCE CSF: ABNORMAL
APPEARANCE SPUN FLD: ABNORMAL
AST SERPL-CCNC: 20 U/L — SIGNIFICANT CHANGE UP (ref 10–40)
BASOPHILS # BLD AUTO: 0.06 K/UL — SIGNIFICANT CHANGE UP (ref 0–0.2)
BASOPHILS NFR BLD AUTO: 1 % — SIGNIFICANT CHANGE UP (ref 0–2)
BILIRUB SERPL-MCNC: 0.2 MG/DL — SIGNIFICANT CHANGE UP (ref 0.2–1.2)
BUN SERPL-MCNC: 15 MG/DL — SIGNIFICANT CHANGE UP (ref 7–23)
CALCIUM SERPL-MCNC: 10.2 MG/DL — SIGNIFICANT CHANGE UP (ref 8.4–10.5)
CHLORIDE SERPL-SCNC: 96 MMOL/L — SIGNIFICANT CHANGE UP (ref 96–108)
CO2 SERPL-SCNC: 28 MMOL/L — SIGNIFICANT CHANGE UP (ref 22–31)
COLOR CSF: ABNORMAL
CREAT SERPL-MCNC: 0.78 MG/DL — SIGNIFICANT CHANGE UP (ref 0.5–1.3)
EGFR: 91 ML/MIN/1.73M2 — SIGNIFICANT CHANGE UP
EOSINOPHIL # BLD AUTO: 0.01 K/UL — SIGNIFICANT CHANGE UP (ref 0–0.5)
EOSINOPHIL NFR BLD AUTO: 0.2 % — SIGNIFICANT CHANGE UP (ref 0–6)
FLUAV AG NPH QL: SIGNIFICANT CHANGE UP
FLUBV AG NPH QL: SIGNIFICANT CHANGE UP
GLUCOSE CSF-MCNC: 231 MG/DL — HIGH (ref 40–70)
GLUCOSE SERPL-MCNC: 286 MG/DL — HIGH (ref 70–99)
GRAM STN FLD: SIGNIFICANT CHANGE UP
HCT VFR BLD CALC: 35.3 % — SIGNIFICANT CHANGE UP (ref 34.5–45)
HGB BLD-MCNC: 12.3 G/DL — SIGNIFICANT CHANGE UP (ref 11.5–15.5)
IMM GRANULOCYTES NFR BLD AUTO: 0.5 % — SIGNIFICANT CHANGE UP (ref 0–0.9)
LDH CSF L TO P-CCNC: 53 U/L — SIGNIFICANT CHANGE UP
LDH FLD-CCNC: 53 U/L — SIGNIFICANT CHANGE UP
LIDOCAIN IGE QN: 35 U/L — SIGNIFICANT CHANGE UP (ref 7–60)
LYMPHOCYTES # BLD AUTO: 1.28 K/UL — SIGNIFICANT CHANGE UP (ref 1–3.3)
LYMPHOCYTES # BLD AUTO: 21.3 % — SIGNIFICANT CHANGE UP (ref 13–44)
LYMPHOCYTES # CSF: 2 % — LOW (ref 40–80)
MAGNESIUM SERPL-MCNC: 2.1 MG/DL — SIGNIFICANT CHANGE UP (ref 1.6–2.6)
MCHC RBC-ENTMCNC: 28.9 PG — SIGNIFICANT CHANGE UP (ref 27–34)
MCHC RBC-ENTMCNC: 34.8 GM/DL — SIGNIFICANT CHANGE UP (ref 32–36)
MCV RBC AUTO: 83.1 FL — SIGNIFICANT CHANGE UP (ref 80–100)
MONOCYTES # BLD AUTO: 0.34 K/UL — SIGNIFICANT CHANGE UP (ref 0–0.9)
MONOCYTES NFR BLD AUTO: 5.7 % — SIGNIFICANT CHANGE UP (ref 2–14)
MONOS+MACROS NFR CSF: 14 % — LOW (ref 15–45)
NEUTROPHILS # BLD AUTO: 4.28 K/UL — SIGNIFICANT CHANGE UP (ref 1.8–7.4)
NEUTROPHILS # CSF: 442 % — HIGH (ref 0–6)
NEUTROPHILS NFR BLD AUTO: 71.3 % — SIGNIFICANT CHANGE UP (ref 43–77)
NRBC # BLD: 0 /100 WBCS — SIGNIFICANT CHANGE UP (ref 0–0)
NRBC NFR CSF: 442 /UL — HIGH (ref 0–5)
PHOSPHATE SERPL-MCNC: 3 MG/DL — SIGNIFICANT CHANGE UP (ref 2.5–4.5)
PLATELET # BLD AUTO: 374 K/UL — SIGNIFICANT CHANGE UP (ref 150–400)
POTASSIUM SERPL-MCNC: 3.9 MMOL/L — SIGNIFICANT CHANGE UP (ref 3.5–5.3)
POTASSIUM SERPL-SCNC: 3.9 MMOL/L — SIGNIFICANT CHANGE UP (ref 3.5–5.3)
PROT CSF-MCNC: 434 MG/DL — HIGH (ref 15–45)
PROT SERPL-MCNC: 7.1 G/DL — SIGNIFICANT CHANGE UP (ref 6–8.3)
RBC # BLD: 4.25 M/UL — SIGNIFICANT CHANGE UP (ref 3.8–5.2)
RBC # CSF: HIGH /UL (ref 0–0)
RBC # FLD: 12.3 % — SIGNIFICANT CHANGE UP (ref 10.3–14.5)
RSV RNA NPH QL NAA+NON-PROBE: SIGNIFICANT CHANGE UP
SARS-COV-2 RNA SPEC QL NAA+PROBE: SIGNIFICANT CHANGE UP
SODIUM SERPL-SCNC: 138 MMOL/L — SIGNIFICANT CHANGE UP (ref 135–145)
SPECIMEN SOURCE: SIGNIFICANT CHANGE UP
TUBE TYPE: SIGNIFICANT CHANGE UP
WBC # BLD: 6 K/UL — SIGNIFICANT CHANGE UP (ref 3.8–10.5)
WBC # FLD AUTO: 6 K/UL — SIGNIFICANT CHANGE UP (ref 3.8–10.5)

## 2024-05-03 PROCEDURE — 62270 DX LMBR SPI PNXR: CPT

## 2024-05-03 PROCEDURE — 70450 CT HEAD/BRAIN W/O DYE: CPT | Mod: 26,59,MC

## 2024-05-03 PROCEDURE — 70496 CT ANGIOGRAPHY HEAD: CPT | Mod: 26,MC

## 2024-05-03 PROCEDURE — 70498 CT ANGIOGRAPHY NECK: CPT | Mod: 26,MC

## 2024-05-03 PROCEDURE — 99285 EMERGENCY DEPT VISIT HI MDM: CPT | Mod: 25

## 2024-05-03 RX ORDER — METOCLOPRAMIDE HCL 10 MG
10 TABLET ORAL ONCE
Refills: 0 | Status: COMPLETED | OUTPATIENT
Start: 2024-05-03 | End: 2024-05-03

## 2024-05-03 RX ORDER — NICARDIPINE HYDROCHLORIDE 30 MG/1
5 CAPSULE, EXTENDED RELEASE ORAL
Qty: 40 | Refills: 0 | Status: DISCONTINUED | OUTPATIENT
Start: 2024-05-03 | End: 2024-05-04

## 2024-05-03 RX ORDER — ACETAMINOPHEN 500 MG
1000 TABLET ORAL ONCE
Refills: 0 | Status: COMPLETED | OUTPATIENT
Start: 2024-05-03 | End: 2024-05-03

## 2024-05-03 RX ORDER — LEVETIRACETAM 250 MG/1
1000 TABLET, FILM COATED ORAL ONCE
Refills: 0 | Status: DISCONTINUED | OUTPATIENT
Start: 2024-05-03 | End: 2024-05-04

## 2024-05-03 RX ORDER — SODIUM CHLORIDE 9 MG/ML
1000 INJECTION INTRAMUSCULAR; INTRAVENOUS; SUBCUTANEOUS ONCE
Refills: 0 | Status: COMPLETED | OUTPATIENT
Start: 2024-05-03 | End: 2024-05-03

## 2024-05-03 RX ORDER — LIDOCAINE HCL 20 MG/ML
10 VIAL (ML) INJECTION ONCE
Refills: 0 | Status: COMPLETED | OUTPATIENT
Start: 2024-05-03 | End: 2024-05-03

## 2024-05-03 RX ADMIN — Medication 400 MILLIGRAM(S): at 16:18

## 2024-05-03 RX ADMIN — SODIUM CHLORIDE 1000 MILLILITER(S): 9 INJECTION INTRAMUSCULAR; INTRAVENOUS; SUBCUTANEOUS at 16:17

## 2024-05-03 RX ADMIN — Medication 10 MILLIGRAM(S): at 15:00

## 2024-05-03 RX ADMIN — NICARDIPINE HYDROCHLORIDE 25 MG/HR: 30 CAPSULE, EXTENDED RELEASE ORAL at 23:54

## 2024-05-03 NOTE — ED PROVIDER NOTE - CLINICAL SUMMARY MEDICAL DECISION MAKING FREE TEXT BOX
This patient presents with a headache most consistent with possible viral syndrome  Differential diagnosis includes migraine versus tension type headache. No headache red flags. Neurologic exam without evidence of meningismus, focal neurologic findings. . Presentation not consistent with acute CNS infection to include meningitis or brain abscess, Temporal arteritis unlikely, as is acute angle closure glaucoma given history and physical findings. Presentation not consistent with other acute, emergent causes of headache at this time. Plan to treat symptomatically with pain medication.   Given onset suddenly, 9/10, vomiting, no hx of similar headache in past, hx of DM, low grade fever will consider imaging and LP

## 2024-05-03 NOTE — ED ADULT NURSE NOTE - OBJECTIVE STATEMENT
Pt is 53y F with PMH type 1 DM complaining of HA. Pt reports onset of temporal HA this morning with neck stiffness. Reports 1 episode of NBNB emesis this morning when drinking water. Checked BGL at home, 400s, took 20 of novolog. Reported to , where BGL was 300s and was transported by EMS to [facility] for further evaluation. Upon assessment, A&Ox4, endorses 6/10 temporal HA, tachy 102bpm, rectal temp 99.9F, all other vitals WNL.

## 2024-05-03 NOTE — ED PROVIDER NOTE - NSCAREINITIATED _GEN_ER
Caller returning call to Clinical Team- Connected to RN- Donald- Connect call to Triage queue- Route message to provider's clinical support pool .          Dilcia Miller(Attending)

## 2024-05-03 NOTE — ED PROVIDER NOTE - PHYSICAL EXAMINATION
Vital signs reviewed  GENERAL: Patient nontoxic appearing, NAD  HEAD: NCAT  EYES: Anicteric  ENT: MMM  NECK: Supple, non tender  RESPIRATORY: Normal respiratory effort. CTA B/L. No wheezing, rales, rhonchi  CARDIOVASCULAR: Regular rate and rhythm  ABDOMEN: Soft. Nondistended. Nontender. No guarding or rebound. No CVA tenderness.  MUSCULOSKELETAL/EXTREMITIES: Brisk cap refill. 2+ radial pulses. No leg edema.  SKIN:  Warm and dry  NEURO: MENTAL STATUS: AAOx3. negative kernig, negative brudinski  LANG/SPEECH: Fluent, repetition & comprehension  CRANIAL NERVES:  II: Pupils equal and reactive, no RAPD, normal visual field and fundus  III, IV, VI: EOM intact, no gaze preference or deviation  V: normal  VII: no facial asymmetry  VIII: normal hearing to speech  MOTOR: 5/5 in both upper and lower extremities  SENSORY: Normal to touch  COORD: Normal finger to nose  no tremor, no dysmetria   PSYCHIATRIC: Cooperative. Affect appropriate.

## 2024-05-03 NOTE — ED ADULT NURSE REASSESSMENT NOTE - NS ED NURSE REASSESS COMMENT FT1
Report received from LETY Pinto. Pt AAOx4, NAD, resp nonlabored, skin warm/dry, resting comfortably in bed. Pt presented to ED c/o neck stiffness and  elevated BG. Pt denies headache, dizziness, chest pain, palpitations, SOB, abd pain, n/v/d, urinary symptoms, fevers, chills, weakness at this time. Pt awaiting CTr. Pt is well appearing and in no acute distress. VSS at this time.  Safety maintained with call bell within reach.

## 2024-05-03 NOTE — ED ADULT NURSE NOTE - IN THE PAST 12 MONTHS HAVE YOU USED DRUGS OTHER THAN THOSE REQUIRED FOR MEDICAL REASON?
Koko updated on pt's status.  Pneumostatic valve placed on chest tube.   Tolerated well. Will repeat CXR.  Treatment plan and prognosis discessed in detail.  All questions answered. No

## 2024-05-03 NOTE — ED PROVIDER NOTE - ATTENDING CONTRIBUTION TO CARE
Dr. Miller: I have personally performed a face to face bedside history and physical examination of this patient. I have discussed the history, examination, review of systems, assessment and plan of management with the resident. I have reviewed the electronic medical record and amended it to reflect my history, review of systems, physical exam, assessment and plan.

## 2024-05-03 NOTE — ED ADULT NURSE NOTE - NSICDXFAMILYHX_GEN_ALL_CORE_FT
FAMILY HISTORY:  Mother  Still living? Unknown  Family history of diabetes mellitus type I, Age at diagnosis: Age Unknown

## 2024-05-03 NOTE — ED ADULT NURSE REASSESSMENT NOTE - NS ED NURSE REASSESS COMMENT FT1
Made aware that pt has headbleed- neuro recommendations for systolic under 140s with nicardipine. Pt gross neuro intact, pt endorsing headache at this time

## 2024-05-03 NOTE — ED PROVIDER NOTE - OBJECTIVE STATEMENT
53y F hx of t1dm p/w headache  pt endorsing onset of headache, moderate to severe, states its 9/10. started suddenly. also endorsing pain behind the neck. +1 episode of nbnb vomiting.   pt denies any cp, palpitations, sob, abdominal pain, v/d, dysuria, numbness, neck stiffness, weakness

## 2024-05-04 ENCOUNTER — APPOINTMENT (OUTPATIENT)
Dept: NEUROSURGERY | Facility: HOSPITAL | Age: 54
End: 2024-05-04

## 2024-05-04 ENCOUNTER — RESULT REVIEW (OUTPATIENT)
Age: 54
End: 2024-05-04

## 2024-05-04 DIAGNOSIS — E11.9 TYPE 2 DIABETES MELLITUS WITHOUT COMPLICATIONS: ICD-10-CM

## 2024-05-04 DIAGNOSIS — E78.5 HYPERLIPIDEMIA, UNSPECIFIED: ICD-10-CM

## 2024-05-04 DIAGNOSIS — I10 ESSENTIAL (PRIMARY) HYPERTENSION: ICD-10-CM

## 2024-05-04 LAB
ANION GAP SERPL CALC-SCNC: 13 MMOL/L — SIGNIFICANT CHANGE UP (ref 5–17)
ANION GAP SERPL CALC-SCNC: 15 MMOL/L — SIGNIFICANT CHANGE UP (ref 5–17)
ANION GAP SERPL CALC-SCNC: 20 MMOL/L — HIGH (ref 5–17)
APPEARANCE UR: CLEAR — SIGNIFICANT CHANGE UP
APTT BLD: 23.8 SEC — LOW (ref 24.5–35.6)
B-OH-BUTYR SERPL-SCNC: 7.3 MMOL/L — HIGH
BACTERIA # UR AUTO: NEGATIVE /HPF — SIGNIFICANT CHANGE UP
BASOPHILS # BLD AUTO: 0.05 K/UL — SIGNIFICANT CHANGE UP (ref 0–0.2)
BASOPHILS NFR BLD AUTO: 0.8 % — SIGNIFICANT CHANGE UP (ref 0–2)
BILIRUB UR-MCNC: NEGATIVE — SIGNIFICANT CHANGE UP
BLD GP AB SCN SERPL QL: NEGATIVE — SIGNIFICANT CHANGE UP
BUN SERPL-MCNC: 11 MG/DL — SIGNIFICANT CHANGE UP (ref 7–23)
BUN SERPL-MCNC: 11 MG/DL — SIGNIFICANT CHANGE UP (ref 7–23)
BUN SERPL-MCNC: 12 MG/DL — SIGNIFICANT CHANGE UP (ref 7–23)
CALCIUM SERPL-MCNC: 8.2 MG/DL — LOW (ref 8.4–10.5)
CALCIUM SERPL-MCNC: 8.6 MG/DL — SIGNIFICANT CHANGE UP (ref 8.4–10.5)
CALCIUM SERPL-MCNC: 8.7 MG/DL — SIGNIFICANT CHANGE UP (ref 8.4–10.5)
CAST: 0 /LPF — SIGNIFICANT CHANGE UP (ref 0–4)
CHLORIDE SERPL-SCNC: 101 MMOL/L — SIGNIFICANT CHANGE UP (ref 96–108)
CHLORIDE SERPL-SCNC: 104 MMOL/L — SIGNIFICANT CHANGE UP (ref 96–108)
CHLORIDE SERPL-SCNC: 99 MMOL/L — SIGNIFICANT CHANGE UP (ref 96–108)
CHOLEST SERPL-MCNC: 389 MG/DL — HIGH
CO2 SERPL-SCNC: 18 MMOL/L — LOW (ref 22–31)
CO2 SERPL-SCNC: 18 MMOL/L — LOW (ref 22–31)
CO2 SERPL-SCNC: 21 MMOL/L — LOW (ref 22–31)
COLOR SPEC: YELLOW — SIGNIFICANT CHANGE UP
CREAT SERPL-MCNC: 0.78 MG/DL — SIGNIFICANT CHANGE UP (ref 0.5–1.3)
CREAT SERPL-MCNC: 0.85 MG/DL — SIGNIFICANT CHANGE UP (ref 0.5–1.3)
CREAT SERPL-MCNC: 0.92 MG/DL — SIGNIFICANT CHANGE UP (ref 0.5–1.3)
DIFF PNL FLD: ABNORMAL
EGFR: 75 ML/MIN/1.73M2 — SIGNIFICANT CHANGE UP
EGFR: 82 ML/MIN/1.73M2 — SIGNIFICANT CHANGE UP
EGFR: 91 ML/MIN/1.73M2 — SIGNIFICANT CHANGE UP
EOSINOPHIL # BLD AUTO: 0.06 K/UL — SIGNIFICANT CHANGE UP (ref 0–0.5)
EOSINOPHIL NFR BLD AUTO: 0.9 % — SIGNIFICANT CHANGE UP (ref 0–6)
GAS PNL BLDA: SIGNIFICANT CHANGE UP
GLUCOSE BLDC GLUCOMTR-MCNC: 101 MG/DL — HIGH (ref 70–99)
GLUCOSE BLDC GLUCOMTR-MCNC: 149 MG/DL — HIGH (ref 70–99)
GLUCOSE BLDC GLUCOMTR-MCNC: 165 MG/DL — HIGH (ref 70–99)
GLUCOSE BLDC GLUCOMTR-MCNC: 181 MG/DL — HIGH (ref 70–99)
GLUCOSE BLDC GLUCOMTR-MCNC: 211 MG/DL — HIGH (ref 70–99)
GLUCOSE BLDC GLUCOMTR-MCNC: 229 MG/DL — HIGH (ref 70–99)
GLUCOSE BLDC GLUCOMTR-MCNC: 248 MG/DL — HIGH (ref 70–99)
GLUCOSE BLDC GLUCOMTR-MCNC: 264 MG/DL — HIGH (ref 70–99)
GLUCOSE BLDC GLUCOMTR-MCNC: 273 MG/DL — HIGH (ref 70–99)
GLUCOSE BLDC GLUCOMTR-MCNC: 287 MG/DL — HIGH (ref 70–99)
GLUCOSE BLDC GLUCOMTR-MCNC: 313 MG/DL — HIGH (ref 70–99)
GLUCOSE BLDC GLUCOMTR-MCNC: 316 MG/DL — HIGH (ref 70–99)
GLUCOSE SERPL-MCNC: 191 MG/DL — HIGH (ref 70–99)
GLUCOSE SERPL-MCNC: 228 MG/DL — HIGH (ref 70–99)
GLUCOSE SERPL-MCNC: 310 MG/DL — HIGH (ref 70–99)
GLUCOSE UR QL: 500 MG/DL
HCT VFR BLD CALC: 31.3 % — LOW (ref 34.5–45)
HDLC SERPL-MCNC: 73 MG/DL — SIGNIFICANT CHANGE UP
HGB BLD-MCNC: 11 G/DL — LOW (ref 11.5–15.5)
IMM GRANULOCYTES NFR BLD AUTO: 0.6 % — SIGNIFICANT CHANGE UP (ref 0–0.9)
INR BLD: 0.84 RATIO — LOW (ref 0.85–1.18)
KETONES UR-MCNC: 15 MG/DL
LEUKOCYTE ESTERASE UR-ACNC: NEGATIVE — SIGNIFICANT CHANGE UP
LIPID PNL WITH DIRECT LDL SERPL: 274 MG/DL — HIGH
LYMPHOCYTES # BLD AUTO: 2.52 K/UL — SIGNIFICANT CHANGE UP (ref 1–3.3)
LYMPHOCYTES # BLD AUTO: 37.9 % — SIGNIFICANT CHANGE UP (ref 13–44)
MAGNESIUM SERPL-MCNC: 1.9 MG/DL — SIGNIFICANT CHANGE UP (ref 1.6–2.6)
MAGNESIUM SERPL-MCNC: 2 MG/DL — SIGNIFICANT CHANGE UP (ref 1.6–2.6)
MCHC RBC-ENTMCNC: 29.3 PG — SIGNIFICANT CHANGE UP (ref 27–34)
MCHC RBC-ENTMCNC: 35.1 GM/DL — SIGNIFICANT CHANGE UP (ref 32–36)
MCV RBC AUTO: 83.2 FL — SIGNIFICANT CHANGE UP (ref 80–100)
MONOCYTES # BLD AUTO: 0.44 K/UL — SIGNIFICANT CHANGE UP (ref 0–0.9)
MONOCYTES NFR BLD AUTO: 6.6 % — SIGNIFICANT CHANGE UP (ref 2–14)
MRSA PCR RESULT.: SIGNIFICANT CHANGE UP
NEUTROPHILS # BLD AUTO: 3.54 K/UL — SIGNIFICANT CHANGE UP (ref 1.8–7.4)
NEUTROPHILS NFR BLD AUTO: 53.2 % — SIGNIFICANT CHANGE UP (ref 43–77)
NITRITE UR-MCNC: NEGATIVE — SIGNIFICANT CHANGE UP
NON HDL CHOLESTEROL: 316 MG/DL — HIGH
NRBC # BLD: 0 /100 WBCS — SIGNIFICANT CHANGE UP (ref 0–0)
PH UR: 5 — SIGNIFICANT CHANGE UP (ref 5–8)
PHOSPHATE SERPL-MCNC: 3 MG/DL — SIGNIFICANT CHANGE UP (ref 2.5–4.5)
PHOSPHATE SERPL-MCNC: 3.2 MG/DL — SIGNIFICANT CHANGE UP (ref 2.5–4.5)
PLATELET # BLD AUTO: 349 K/UL — SIGNIFICANT CHANGE UP (ref 150–400)
POTASSIUM SERPL-MCNC: 3.3 MMOL/L — LOW (ref 3.5–5.3)
POTASSIUM SERPL-MCNC: 3.7 MMOL/L — SIGNIFICANT CHANGE UP (ref 3.5–5.3)
POTASSIUM SERPL-MCNC: 4.4 MMOL/L — SIGNIFICANT CHANGE UP (ref 3.5–5.3)
POTASSIUM SERPL-SCNC: 3.3 MMOL/L — LOW (ref 3.5–5.3)
POTASSIUM SERPL-SCNC: 3.7 MMOL/L — SIGNIFICANT CHANGE UP (ref 3.5–5.3)
POTASSIUM SERPL-SCNC: 4.4 MMOL/L — SIGNIFICANT CHANGE UP (ref 3.5–5.3)
PROT UR-MCNC: 300 MG/DL
PROTHROM AB SERPL-ACNC: 9.3 SEC — LOW (ref 9.5–13)
RBC # BLD: 3.76 M/UL — LOW (ref 3.8–5.2)
RBC # FLD: 12.6 % — SIGNIFICANT CHANGE UP (ref 10.3–14.5)
RBC CASTS # UR COMP ASSIST: 4 /HPF — SIGNIFICANT CHANGE UP (ref 0–4)
REVIEW: SIGNIFICANT CHANGE UP
RH IG SCN BLD-IMP: POSITIVE — SIGNIFICANT CHANGE UP
S AUREUS DNA NOSE QL NAA+PROBE: SIGNIFICANT CHANGE UP
SODIUM SERPL-SCNC: 135 MMOL/L — SIGNIFICANT CHANGE UP (ref 135–145)
SODIUM SERPL-SCNC: 137 MMOL/L — SIGNIFICANT CHANGE UP (ref 135–145)
SODIUM SERPL-SCNC: 137 MMOL/L — SIGNIFICANT CHANGE UP (ref 135–145)
SP GR SPEC: >1.03 — HIGH (ref 1–1.03)
SQUAMOUS # UR AUTO: 0 /HPF — SIGNIFICANT CHANGE UP (ref 0–5)
T3 SERPL-MCNC: 60 NG/DL — LOW (ref 80–200)
T4 AB SER-ACNC: 6.9 UG/DL — SIGNIFICANT CHANGE UP (ref 4.6–12)
T4 FREE SERPL-MCNC: 1.1 NG/DL — SIGNIFICANT CHANGE UP (ref 0.9–1.8)
TRIGL SERPL-MCNC: 203 MG/DL — HIGH
TSH SERPL-MCNC: 2.44 UIU/ML — SIGNIFICANT CHANGE UP (ref 0.27–4.2)
UROBILINOGEN FLD QL: 0.2 MG/DL — SIGNIFICANT CHANGE UP (ref 0.2–1)
WBC # BLD: 6.65 K/UL — SIGNIFICANT CHANGE UP (ref 3.8–10.5)
WBC # FLD AUTO: 6.65 K/UL — SIGNIFICANT CHANGE UP (ref 3.8–10.5)
WBC UR QL: 1 /HPF — SIGNIFICANT CHANGE UP (ref 0–5)

## 2024-05-04 PROCEDURE — 99291 CRITICAL CARE FIRST HOUR: CPT

## 2024-05-04 PROCEDURE — 36620 INSERTION CATHETER ARTERY: CPT

## 2024-05-04 PROCEDURE — 36227 PLACE CATH XTRNL CAROTID: CPT | Mod: 50

## 2024-05-04 PROCEDURE — 93306 TTE W/DOPPLER COMPLETE: CPT | Mod: 26

## 2024-05-04 PROCEDURE — 36226 PLACE CATH VERTEBRAL ART: CPT | Mod: 50

## 2024-05-04 PROCEDURE — 76377 3D RENDER W/INTRP POSTPROCES: CPT | Mod: 26

## 2024-05-04 PROCEDURE — 76937 US GUIDE VASCULAR ACCESS: CPT | Mod: 26

## 2024-05-04 PROCEDURE — 99223 1ST HOSP IP/OBS HIGH 75: CPT | Mod: 25

## 2024-05-04 PROCEDURE — 72156 MRI NECK SPINE W/O & W/DYE: CPT | Mod: 26

## 2024-05-04 PROCEDURE — 36224 PLACE CATH CAROTD ART: CPT | Mod: 50

## 2024-05-04 PROCEDURE — 93970 EXTREMITY STUDY: CPT | Mod: 26

## 2024-05-04 PROCEDURE — 70450 CT HEAD/BRAIN W/O DYE: CPT | Mod: 26

## 2024-05-04 PROCEDURE — 70553 MRI BRAIN STEM W/O & W/DYE: CPT | Mod: 26

## 2024-05-04 PROCEDURE — 99223 1ST HOSP IP/OBS HIGH 75: CPT

## 2024-05-04 RX ORDER — SENNA PLUS 8.6 MG/1
2 TABLET ORAL AT BEDTIME
Refills: 0 | Status: DISCONTINUED | OUTPATIENT
Start: 2024-05-04 | End: 2024-05-17

## 2024-05-04 RX ORDER — CHLORHEXIDINE GLUCONATE 213 G/1000ML
1 SOLUTION TOPICAL DAILY
Refills: 0 | Status: DISCONTINUED | OUTPATIENT
Start: 2024-05-04 | End: 2024-05-09

## 2024-05-04 RX ORDER — ATORVASTATIN CALCIUM 80 MG/1
20 TABLET, FILM COATED ORAL AT BEDTIME
Refills: 0 | Status: DISCONTINUED | OUTPATIENT
Start: 2024-05-04 | End: 2024-05-04

## 2024-05-04 RX ORDER — ATORVASTATIN CALCIUM 80 MG/1
40 TABLET, FILM COATED ORAL AT BEDTIME
Refills: 0 | Status: DISCONTINUED | OUTPATIENT
Start: 2024-05-04 | End: 2024-05-17

## 2024-05-04 RX ORDER — HYDRALAZINE HCL 50 MG
5 TABLET ORAL ONCE
Refills: 0 | Status: DISCONTINUED | OUTPATIENT
Start: 2024-05-04 | End: 2024-05-09

## 2024-05-04 RX ORDER — INSULIN GLARGINE 100 [IU]/ML
15 INJECTION, SOLUTION SUBCUTANEOUS AT BEDTIME
Refills: 0 | Status: DISCONTINUED | OUTPATIENT
Start: 2024-05-05 | End: 2024-05-05

## 2024-05-04 RX ORDER — SODIUM CHLORIDE 9 MG/ML
1000 INJECTION INTRAMUSCULAR; INTRAVENOUS; SUBCUTANEOUS
Refills: 0 | Status: DISCONTINUED | OUTPATIENT
Start: 2024-05-04 | End: 2024-05-05

## 2024-05-04 RX ORDER — NIMODIPINE 60 MG/10ML
60 SOLUTION ORAL EVERY 4 HOURS
Refills: 0 | Status: DISCONTINUED | OUTPATIENT
Start: 2024-05-04 | End: 2024-05-06

## 2024-05-04 RX ORDER — INSULIN LISPRO 100/ML
5 VIAL (ML) SUBCUTANEOUS
Refills: 0 | Status: DISCONTINUED | OUTPATIENT
Start: 2024-05-04 | End: 2024-05-05

## 2024-05-04 RX ORDER — POLYETHYLENE GLYCOL 3350 17 G/17G
17 POWDER, FOR SOLUTION ORAL DAILY
Refills: 0 | Status: DISCONTINUED | OUTPATIENT
Start: 2024-05-04 | End: 2024-05-05

## 2024-05-04 RX ORDER — INSULIN LISPRO 100/ML
VIAL (ML) SUBCUTANEOUS
Refills: 0 | Status: DISCONTINUED | OUTPATIENT
Start: 2024-05-04 | End: 2024-05-06

## 2024-05-04 RX ORDER — POTASSIUM CHLORIDE 20 MEQ
10 PACKET (EA) ORAL
Refills: 0 | Status: COMPLETED | OUTPATIENT
Start: 2024-05-04 | End: 2024-05-04

## 2024-05-04 RX ORDER — POTASSIUM CHLORIDE 20 MEQ
20 PACKET (EA) ORAL ONCE
Refills: 0 | Status: COMPLETED | OUTPATIENT
Start: 2024-05-04 | End: 2024-05-04

## 2024-05-04 RX ORDER — ACETAMINOPHEN 500 MG
1000 TABLET ORAL ONCE
Refills: 0 | Status: COMPLETED | OUTPATIENT
Start: 2024-05-04 | End: 2024-05-04

## 2024-05-04 RX ORDER — INSULIN GLARGINE 100 [IU]/ML
15 INJECTION, SOLUTION SUBCUTANEOUS AT BEDTIME
Refills: 0 | Status: DISCONTINUED | OUTPATIENT
Start: 2024-05-04 | End: 2024-05-04

## 2024-05-04 RX ORDER — POTASSIUM CHLORIDE 20 MEQ
40 PACKET (EA) ORAL ONCE
Refills: 0 | Status: COMPLETED | OUTPATIENT
Start: 2024-05-04 | End: 2024-05-04

## 2024-05-04 RX ORDER — INSULIN LISPRO 100/ML
VIAL (ML) SUBCUTANEOUS
Refills: 0 | Status: DISCONTINUED | OUTPATIENT
Start: 2024-05-04 | End: 2024-05-04

## 2024-05-04 RX ORDER — SODIUM CHLORIDE 9 MG/ML
500 INJECTION INTRAMUSCULAR; INTRAVENOUS; SUBCUTANEOUS ONCE
Refills: 0 | Status: COMPLETED | OUTPATIENT
Start: 2024-05-04 | End: 2024-05-04

## 2024-05-04 RX ORDER — SODIUM CHLORIDE 9 MG/ML
250 INJECTION INTRAMUSCULAR; INTRAVENOUS; SUBCUTANEOUS ONCE
Refills: 0 | Status: COMPLETED | OUTPATIENT
Start: 2024-05-04 | End: 2024-05-04

## 2024-05-04 RX ORDER — LEVETIRACETAM 250 MG/1
500 TABLET, FILM COATED ORAL
Refills: 0 | Status: DISCONTINUED | OUTPATIENT
Start: 2024-05-04 | End: 2024-05-06

## 2024-05-04 RX ORDER — LEVETIRACETAM 250 MG/1
1000 TABLET, FILM COATED ORAL ONCE
Refills: 0 | Status: COMPLETED | OUTPATIENT
Start: 2024-05-04 | End: 2024-05-04

## 2024-05-04 RX ORDER — INSULIN LISPRO 100/ML
2 VIAL (ML) SUBCUTANEOUS ONCE
Refills: 0 | Status: COMPLETED | OUTPATIENT
Start: 2024-05-04 | End: 2024-05-04

## 2024-05-04 RX ORDER — OXYCODONE HYDROCHLORIDE 5 MG/1
5 TABLET ORAL EVERY 4 HOURS
Refills: 0 | Status: DISCONTINUED | OUTPATIENT
Start: 2024-05-04 | End: 2024-05-07

## 2024-05-04 RX ORDER — ACETAMINOPHEN 500 MG
650 TABLET ORAL EVERY 6 HOURS
Refills: 0 | Status: DISCONTINUED | OUTPATIENT
Start: 2024-05-04 | End: 2024-05-17

## 2024-05-04 RX ORDER — INSULIN GLARGINE 100 [IU]/ML
15 INJECTION, SOLUTION SUBCUTANEOUS ONCE
Refills: 0 | Status: COMPLETED | OUTPATIENT
Start: 2024-05-04 | End: 2024-05-04

## 2024-05-04 RX ORDER — INSULIN HUMAN 100 [IU]/ML
2 INJECTION, SOLUTION SUBCUTANEOUS
Qty: 100 | Refills: 0 | Status: DISCONTINUED | OUTPATIENT
Start: 2024-05-04 | End: 2024-05-04

## 2024-05-04 RX ORDER — POTASSIUM CHLORIDE 20 MEQ
40 PACKET (EA) ORAL EVERY 4 HOURS
Refills: 0 | Status: DISCONTINUED | OUTPATIENT
Start: 2024-05-04 | End: 2024-05-04

## 2024-05-04 RX ORDER — MAGNESIUM SULFATE 500 MG/ML
2 VIAL (ML) INJECTION ONCE
Refills: 0 | Status: COMPLETED | OUTPATIENT
Start: 2024-05-04 | End: 2024-05-04

## 2024-05-04 RX ORDER — DEXTROSE 50 % IN WATER 50 %
50 SYRINGE (ML) INTRAVENOUS
Refills: 0 | Status: DISCONTINUED | OUTPATIENT
Start: 2024-05-04 | End: 2024-05-04

## 2024-05-04 RX ADMIN — ATORVASTATIN CALCIUM 40 MILLIGRAM(S): 80 TABLET, FILM COATED ORAL at 22:24

## 2024-05-04 RX ADMIN — Medication 2: at 16:33

## 2024-05-04 RX ADMIN — Medication 100 MILLIEQUIVALENT(S): at 14:27

## 2024-05-04 RX ADMIN — NICARDIPINE HYDROCHLORIDE 25 MG/HR: 30 CAPSULE, EXTENDED RELEASE ORAL at 00:00

## 2024-05-04 RX ADMIN — LEVETIRACETAM 500 MILLIGRAM(S): 250 TABLET, FILM COATED ORAL at 17:44

## 2024-05-04 RX ADMIN — NIMODIPINE 60 MILLIGRAM(S): 60 SOLUTION ORAL at 22:21

## 2024-05-04 RX ADMIN — INSULIN GLARGINE 15 UNIT(S): 100 INJECTION, SOLUTION SUBCUTANEOUS at 15:47

## 2024-05-04 RX ADMIN — NIMODIPINE 60 MILLIGRAM(S): 60 SOLUTION ORAL at 06:00

## 2024-05-04 RX ADMIN — NIMODIPINE 60 MILLIGRAM(S): 60 SOLUTION ORAL at 18:10

## 2024-05-04 RX ADMIN — SODIUM CHLORIDE 75 MILLILITER(S): 9 INJECTION INTRAMUSCULAR; INTRAVENOUS; SUBCUTANEOUS at 01:00

## 2024-05-04 RX ADMIN — Medication 400 MILLIGRAM(S): at 02:00

## 2024-05-04 RX ADMIN — Medication 40 MILLIEQUIVALENT(S): at 13:08

## 2024-05-04 RX ADMIN — Medication 5 UNIT(S): at 16:33

## 2024-05-04 RX ADMIN — NIMODIPINE 60 MILLIGRAM(S): 60 SOLUTION ORAL at 10:08

## 2024-05-04 RX ADMIN — SODIUM CHLORIDE 1000 MILLILITER(S): 9 INJECTION INTRAMUSCULAR; INTRAVENOUS; SUBCUTANEOUS at 03:30

## 2024-05-04 RX ADMIN — Medication 6: at 22:51

## 2024-05-04 RX ADMIN — Medication 20 MILLIEQUIVALENT(S): at 06:27

## 2024-05-04 RX ADMIN — CHLORHEXIDINE GLUCONATE 1 APPLICATION(S): 213 SOLUTION TOPICAL at 22:31

## 2024-05-04 RX ADMIN — Medication 100 MILLIEQUIVALENT(S): at 13:11

## 2024-05-04 RX ADMIN — Medication 2 UNIT(S): at 22:52

## 2024-05-04 RX ADMIN — NIMODIPINE 60 MILLIGRAM(S): 60 SOLUTION ORAL at 14:34

## 2024-05-04 RX ADMIN — SODIUM CHLORIDE 75 MILLILITER(S): 9 INJECTION INTRAMUSCULAR; INTRAVENOUS; SUBCUTANEOUS at 11:58

## 2024-05-04 RX ADMIN — LEVETIRACETAM 400 MILLIGRAM(S): 250 TABLET, FILM COATED ORAL at 00:06

## 2024-05-04 RX ADMIN — SENNA PLUS 2 TABLET(S): 8.6 TABLET ORAL at 22:21

## 2024-05-04 RX ADMIN — SODIUM CHLORIDE 250 MILLILITER(S): 9 INJECTION INTRAMUSCULAR; INTRAVENOUS; SUBCUTANEOUS at 11:58

## 2024-05-04 RX ADMIN — Medication 25 GRAM(S): at 17:43

## 2024-05-04 RX ADMIN — INSULIN HUMAN 2 UNIT(S)/HR: 100 INJECTION, SOLUTION SUBCUTANEOUS at 06:00

## 2024-05-04 RX ADMIN — SODIUM CHLORIDE 75 MILLILITER(S): 9 INJECTION INTRAMUSCULAR; INTRAVENOUS; SUBCUTANEOUS at 00:50

## 2024-05-04 RX ADMIN — Medication 1000 MILLIGRAM(S): at 02:30

## 2024-05-04 RX ADMIN — LEVETIRACETAM 500 MILLIGRAM(S): 250 TABLET, FILM COATED ORAL at 06:27

## 2024-05-04 NOTE — OCCUPATIONAL THERAPY INITIAL EVALUATION ADULT - PERTINENT HX OF CURRENT PROBLEM, REHAB EVAL
53F, no AC/AP, pmh diabetes, p/w severe sudden-onset bifrontal HA this AM with associated neck stiffness/blurry vision. CTH/CTA read as negative by radiologist but appears to have R perimes SAH (HH1, MF1). LP in ED w/ 52133 RBCs, and xanthochromic. Afebrile, WBC wnl. PLTs wnl, Coags wnl.HEAD CT: No acute intracranial hemorrhage or acute territorial infarction. CTA COW:  Patent intracranial circulation without flow limiting stenosis or large vessel occlusion. CTA NECK: Patent, ECAs, ICAs, no  hemodynamically significant stenosis at  ICA origins by NASCET criteria.

## 2024-05-04 NOTE — PATIENT PROFILE ADULT - FALL HARM RISK - TYPE OF ASSESSMENT
Chief Complaint   Patient presents with    Pre-op Exam     cataract removal, 12/21/18, Dr. Enoc Ng Maintenance Due   Topic Date Due    DTaP/Tdap/Td series (1 - Tdap) 08/30/1958    Shingrix Vaccine Age 50> (1 of 2) 08/30/1987       Health Maintenance reviewed       1. Have you been to the ER, urgent care clinic since your last visit? Hospitalized since your last visit? No    2. Have you seen or consulted any other health care providers outside of the [address] [place] [address] since your last visit? Include any pap smears or colon screening.  No     Learning screening updated Admission

## 2024-05-04 NOTE — PATIENT PROFILE ADULT - NSPROEXTENSIONSOFSELF_GEN_A_NUR
Patient : Rosenda Berg Age: 26 year old Sex: female   MRN: [id number] Encounter Date: 1/28/2017      History     Chief Complaint   Patient presents with   • Back Pain     upper back pain x1 day     HPI pain to upper back x 1.5 days radiating down spine to hips; relates heavy lifting at work and picking up more shifts than usual; no falls nor direct trauma; no hx of cancer nor IV drug use; no neurologic deficits nor radicular pains; has tried otc analgesics w/o relief;     ALLERGIES:  No Known Allergies    Prior to Admission Medications    ACETAMINOPHEN-CODEINE (TYLENOL NO.3) 300-30 MG PER TABLET    Take 1 tablet by mouth every 4 hours as needed for Pain.    ACETAMINOPHEN-CODEINE (TYLENOL NO.3) 300-30 MG PER TABLET    Take 1 tablet by mouth every 4 hours as needed for Pain.    CYCLOBENZAPRINE (FLEXERIL) 5 MG TABLET    Take 1 tablet by mouth 3 times daily as needed for Muscle spasms.    CYCLOBENZAPRINE (FLEXERIL) 5 MG TABLET    Take 1 tablet by mouth 3 times daily as needed for Muscle spasms.    HYDROCODONE-ACETAMINOPHEN (NORCO) 5-325 MG PER TABLET    Take 1-2 tablets by mouth every 4 hours as needed for Pain.    IBUPROFEN (MOTRIN) 400 MG TABLET    Take 1 tablet by mouth every 6 hours as needed for Pain.    IBUPROFEN (MOTRIN) 400 MG TABLET    Take 1 tablet by mouth every 6 hours as needed for Pain.    MECLIZINE HCL (DRAMAMINE) 25 MG TABLET    Take 1 tablet by mouth 3 times daily as needed for Dizziness.    ONDANSETRON (ZOFRAN ODT) 8 MG DISINTEGRATING TABLET    Take 1 tablet by mouth every 8 hours as needed for Nausea.       New Prescriptions    No medications on file       Past Medical History   Diagnosis Date   • Chronic back pain    • Headache(784.0)    • Marijuana smoker in remission    • Obesity        History reviewed. No pertinent past surgical history.    History reviewed. No pertinent family history.    Social History   Substance Use Topics   • Smoking status: Current Some Day Smoker     Types: Cigarettes   •  Smokeless tobacco: Never Used   • Alcohol use No       Review of Systems Negative, non pertinent, non contributory for all remaining 13 systems other than stated within the HPI         Physical Exam       ED Triage Vitals   ED Triage Vitals Group      Temp 01/28/17 1620 97.8 °F (36.6 °C)      Pulse 01/28/17 1620 62      Resp 01/28/17 1620 16      BP 01/28/17 1620 151/78      SpO2 01/28/17 1620 100 %      EtCO2 mmHg --       Height 01/28/17 1620 5' 4\" (1.626 m)      Weight 01/28/17 1620 220 lb (99.8 kg)      Weight Scale Used 01/28/17 1620 ED Stated       Physical Exam afebrile VSS [id number] NAD  Back: no deformity, swelling, erythema, bruising nor rash; tender along thoracic spine in midline from T5-T12; minimal paraspinal tenderness; not worse with arm movements;  Lungs: CTA  Cor: RRR  Ribs nontender    ED Course     Procedures    Lab Results     EKG Results      Radiology Results     MDM    Clinical Impression     ED Diagnosis        Final diagnosis    Acute midline thoracic back pain           Disposition        Discharge  Rosenda R Berg discharge to home/self care.    New Prescriptions    HYDROCODONE-ACETAMINOPHEN (NORCO) 5-325 MG PER TABLET    Take 1 tablet by mouth every 4 hours as needed for Pain.                Manny Santizo MD  01/28/17 1696     dentures

## 2024-05-04 NOTE — PROGRESS NOTE ADULT - ASSESSMENT
ASSESSMENT/PLAN: HH2 mF 1 subarachnoid hemorrhage, post-bleed day 0    NEURO:  Neurochecks Q1H  Repeat CTH 4 hours from initial scan  Diagnosis: CTA Head & Neck, conventional angiogram  Treatment: OR vs. IR for aneurysm treatment if present  Seizure Prophylaxis: Levetiracetam until aneurysm treated  Delayed Cerebral Ischemia Management: Serial screening TCDs, euvolemia, nimodipine  Hydrocephalus: EVD in place  Pain: PRN analgesics  Activity: [x] OOB as tolerated [] Bedrest [] PT [] OT [] PMNR    PULM:  Incentive spirometry  Maintain SpO2 >94%    CV:  SBP goal  mmHg  TTE and lipid levels - p    RENAL:  Fluids: IVF while NPO  Maintain normonatremia, normovolemia  Monitor strict I/Os    GI:  Diet: NPO for possible angio  GI prophylaxis [x] not indicated [] PPI [] other:  Bowel regimen [x] miralax [x] senna [] other:    ENDO:   Goal euglycemia (-180)  Will obtain HbA1c    HEME/ONC:  Monitor H/H  VTE prophylaxis: [x] SCDs [] chemoprophylaxis [x] hold chemoprophylaxis due to: PBD0 [] high risk of DVT/PE on admission due to:    ID:  Monitor for fevers    MISC:    SOCIAL/FAMILY:  [x] awaiting [] updated at bedside [] family meeting    CODE STATUS:  [x] Full Code [] DNR [] DNI [] Palliative/Comfort Care    DISPOSITION:  [x] ICU [] Stroke Unit [] Floor [] EMU [] RCU [] PCU    [x] Patient is at high risk of neurologic deterioration/death due to: SAH, vasospasms, aneurysmal rerupture   ASSESSMENT/PLAN: HH2 mF 1 subarachnoid hemorrhage, post-bleed day 0    NEURO:  Neurochecks Q1H  Repeat CTH 4 hours from initial scan  Diagnosis: CTA Head & Neck, conventional angiogram  Treatment: OR vs. IR for aneurysm treatment if present  Seizure Prophylaxis: Levetiracetam until aneurysm treated  Delayed Cerebral Ischemia Management: Serial screening TCDs, euvolemia, nimodipine  Hydrocephalus: EVD in place  Pain: PRN analgesics  Activity: [x] OOB as tolerated [] Bedrest [] PT [] OT [] PMNR    PULM:  Incentive spirometry  Maintain SpO2 >94%    CV:  SBP goal  mmHg  TTE and lipid levels - p    RENAL:  Fluids: IVF while NPO  Maintain normonatremia, normovolemia  Monitor strict I/Os    GI:  Diet: NPO for possible angio  GI prophylaxis [x] not indicated [] PPI [] other:  Bowel regimen [x] miralax [x] senna [] other:    ENDO:   Goal euglycemia (-180)  Will obtain HbA1c  Elevated BG, >2 readings > 200  Will start patient on insulin gtt and monitor glucose  Will consult endocrinology     HEME/ONC:  Monitor H/H  VTE prophylaxis: [x] SCDs [] chemoprophylaxis [x] hold chemoprophylaxis due to: PBD0 [] high risk of DVT/PE on admission due to:    ID:  Monitor for fevers    MISC:    SOCIAL/FAMILY:  [x] awaiting [] updated at bedside [] family meeting    CODE STATUS:  [x] Full Code [] DNR [] DNI [] Palliative/Comfort Care    DISPOSITION:  [x] ICU [] Stroke Unit [] Floor [] EMU [] RCU [] PCU    [x] Patient is at high risk of neurologic deterioration/death due to: SAH, vasospasms, aneurysmal rerupture

## 2024-05-04 NOTE — PROGRESS NOTE ADULT - ASSESSMENT
ASSESSMENT/PLAN: HH2 mF 1 subarachnoid hemorrhage, post-bleed day 0    NEURO:  Neurochecks Q1H  Repeat CTH 4 hours from initial scan  Diagnosis: CTA Head & Neck, conventional angiogram  Treatment: OR vs. IR for aneurysm treatment if present  Seizure Prophylaxis: Levetiracetam until aneurysm treated  Delayed Cerebral Ischemia Management: Serial screening TCDs, euvolemia, nimodipine  Hydrocephalus: EVD in place  Pain: PRN analgesics  Activity: [x] OOB as tolerated [] Bedrest [] PT [] OT [] PMNR    PULM:  Incentive spirometry  Maintain SpO2 >94%    CV:  SBP goal  mmHg  TTE and lipid levels - p    RENAL:  Fluids: IVF while NPO  Maintain normonatremia, normovolemia  Monitor strict I/Os    GI:  Diet: NPO for possible angio  GI prophylaxis [x] not indicated [] PPI [] other:  Bowel regimen [x] miralax [x] senna [] other:    ENDO:   Goal euglycemia (-180)  Will obtain HbA1c  Elevated BG, >2 readings > 200  Will start patient on insulin gtt and monitor glucose  Will consult endocrinology     HEME/ONC:  Monitor H/H  VTE prophylaxis: [x] SCDs [] chemoprophylaxis [x] hold chemoprophylaxis due to: PBD0 [] high risk of DVT/PE on admission due to:    ID:  Monitor for fevers    MISC:    SOCIAL/FAMILY:  [x] awaiting [] updated at bedside [] family meeting    CODE STATUS:  [x] Full Code [] DNR [] DNI [] Palliative/Comfort Care    DISPOSITION:  [x] ICU [] Stroke Unit [] Floor [] EMU [] RCU [] PCU    [x] Patient is at high risk of neurologic deterioration/death due to: SAH, vasospasms, aneurysmal rerupture   ASSESSMENT/PLAN: HH2 mF 1 subarachnoid hemorrhage, post-bleed day 0    NEURO:  Neurochecks Q1H  Repeat CTH : stable  Angio 05/04: negative  Seizure Prophylaxis: Levetiracetam until MRI negative  Delayed Cerebral Ischemia Management: Serial screening TCDs, euvolemia, nimodipine  Hydrocephalus: No EVD  Pain: PRN analgesics  Activity: [x] OOB as tolerated [] Bedrest [] PT [] OT [] PMNR    PULM:  Incentive spirometry  Maintain SpO2 >94%    CV:  SBP goal  mmHg ( will liberalize BP once MRI is back )  TTE and lipid levels - p    RENAL:  Fluids: NPO for DKA protocol   Maintain normonatremia, normovolemia  Monitor strict I/Os  BMP Mg and Phos Q6    GI:  Diet: NPO for DKA protocol   GI prophylaxis [x] not indicated [] PPI [] other:  Bowel regimen [x] miralax [x] senna [] other:    ENDO:   Goal euglycemia (-180)  Will obtain HbA1c  GAP : 20, Positive for ketones  will initiate DKA protocol   Elevated BG, >2 readings > 200  Will start patient on insulin gtt and monitor glucose  Will consult endocrinology     HEME/ONC:  Monitor H/H  VTE prophylaxis: [x] SCDs [] chemoprophylaxis [x] hold chemoprophylaxis due to: PBD0 [] high risk of DVT/PE on admission due to:    ID:  Monitor for fevers    MISC:    SOCIAL/FAMILY:  [x] awaiting [] updated at bedside [] family meeting    CODE STATUS:  [x] Full Code [] DNR [] DNI [] Palliative/Comfort Care    DISPOSITION:  [x] ICU [] Stroke Unit [] Floor [] EMU [] RCU [] PCU    [x] Patient is at high risk of neurologic deterioration/death due to: SAH, vasospasms, aneurysmal rerupture

## 2024-05-04 NOTE — PROGRESS NOTE ADULT - ASSESSMENT
ASSESSMENT/PLAN: HH2 mF 1 subarachnoid hemorrhage, post-bleed day 1    NEURO:  Neurochecks Q1H  Repeat CTH : stable  Angio 05/04: negative  Seizure Prophylaxis: Levetiracetam until MRI negative  Delayed Cerebral Ischemia Management: Serial screening TCDs, euvolemia, nimodipine  Hydrocephalus: No EVD  Pain: PRN analgesics  Activity: [x] OOB as tolerated [] Bedrest [] PT [] OT [] PMNR    PULM:  Incentive spirometry  Maintain SpO2 >94%    CV:  SBP goal  mmHg ( will liberalize BP once MRI is back )  TTE- EF 63%, normal atria, normal LV function   LDL >200-- will start Atorvastatin 40 mg QD  L Radial A line     RENAL:  Fluids: CCD  Maintain normonatremia, normovolemia  Monitor strict I/Os    GI:  Diet: CCD diet  GI prophylaxis [x] not indicated [] PPI [] other:  Bowel regimen [x] miralax [x] senna [] other:    ENDO:   Goal euglycemia (-180)  Will obtain HbA1c  GAP : 20, Positive for ketones  Patient s/p insulin gtt, now on Lantus 15 units at bedtime, Lispro 5 units before meals    HEME/ONC:  Monitor H/H  VTE prophylaxis: [x] SCDs [x] chemoprophylaxis- restart if MRI stable [] hold chemoprophylaxis due to: PBD0 [] high risk of DVT/PE on admission due to:    ID:  Monitor for fevers    MISC:    SOCIAL/FAMILY:  [x] awaiting [] updated at bedside [] family meeting    CODE STATUS:  [x] Full Code [] DNR [] DNI [] Palliative/Comfort Care    DISPOSITION:  [x] ICU [] Stroke Unit [] Floor [] EMU [] RCU [] PCU    [x] Patient is at high risk of neurologic deterioration/death due to: SAH, vasospasms, aneurysmal rerupture

## 2024-05-04 NOTE — CHART NOTE - NSCHARTNOTEFT_GEN_A_CORE
Interventional Neuro- Radiology   Procedure Note    Procedure: Selective Cerebral Angiography   Pre- Procedure Diagnosis: SAH   Post- Procedure Diagnosis:    : Dr. Rloo MD  Fellow:  Dr. Loreto MD   Physician Assistant: Stephanie Arriola PA-C    RN: Claude Linares: Gene/ William     Anesthesia:    (MAC)   (general anesthesia)    I/Os:  Fluids:  [place]:  Contrast:  Estimated Blood Loss: <10cc      Preliminary Report:  Under MAC/ general anesthesia, using a ___Fr short/long sheath to the right/ left/ bilateral femoral artery/ radial artery examination of left vertebral artery/ left internal carotid artery/ left external carotid artery/ right vertebral artery/ right internal carotid artery/ right external carotid artery via selective cerebral angiography demonstrates ________. ( Official note to follow).    Patient tolerated procedure well, vital signs stable, hemodynamically stable, no change in neurological status compared to baseline. Results discussed with patient and their family. Groin sheath d/c'ed, manual compression held to hemostasis, no active bleeding, no hematoma, Vascade applied, quick clot and safeguard balloon dressing applied at _____h. Patient transferred to NSCU for further care/ monitoring. Interventional Neuro- Radiology   Procedure Note    Procedure: Selective Cerebral Angiography   Pre- Procedure Diagnosis: SAH   Post- Procedure Diagnosis: SAH angiogram negative     : Dr. Rolo MD  Fellow:  Dr. Loreto MD   Physician Assistant: Stephanie Arriola PA-C    RN: Claude Linares: Gene/ William     Anesthesia:  Dr. Wray  (MAC)       I/Os:  Fluids: 250cc    Ratliff: 400cc   Contrast: 119cc   Estimated Blood Loss: <10cc      Preliminary Report:  Under MAC, using a 5Fr short sheath to the right femoral artery examination of left vertebral artery/ left internal carotid artery/ left external carotid artery/ right vertebral artery/ right internal carotid artery/ right external carotid artery via selective cerebral angiography demonstrates no vascular malformation. ( Official note to follow).    Patient tolerated procedure well, vital signs stable, hemodynamically stable, no change in neurological status compared to baseline. Results discussed with patient and their family. Groin sheath d/c'ed, manual compression held to hemostasis, no active bleeding, no hematoma, Vascade applied, quick clot and safeguard balloon dressing applied at 900h. Patient transferred to NSCU for further care/ monitoring.

## 2024-05-04 NOTE — CHART NOTE - NSCHARTNOTEFT_GEN_A_CORE
Interventional Neuro Radiology  Pre-Procedure Note    This is a 53F, no AC/AP, pmh diabetes, p/w severe sudden-onset bifrontal HA this AM with associated neck stiffness/blurry vision. CTH/CTA read as negative by radiologist but appears to have R perimes SAH (HH1, MF1). LP in ED w/ 10391 RBCs, and xanthochromic. Afebrile, WBC wnl. PLTs wnl, Coags wnl.     Neuro Exam: Awake and alert, oriented x3, fluent, normal naming and repetition, Elzbieta   PAST MEDICAL & SURGICAL HISTORY:  DM  No significant past surgical history    Social History:   Denies tobacco use    FAMILY HISTORY:  Family history of diabetes mellitus type I (Mother)      Allergies:   No Known Allergies      Current Medications:   acetaminophen     Tablet .. 650 milliGRAM(s) Oral every 6 hours PRN  atorvastatin 20 milliGRAM(s) Oral at bedtime  chlorhexidine 4% Liquid 1 Application(s) Topical daily  dextrose 50% Injectable 50 milliLiter(s) IV Push every 15 minutes  insulin regular Infusion 2 Unit(s)/Hr IV Continuous <Continuous>  levETIRAcetam 500 milliGRAM(s) Oral two times a day  niMODipine 60 milliGRAM(s) Oral every 4 hours  oxyCODONE    IR 5 milliGRAM(s) Oral every 4 hours PRN  polyethylene glycol 3350 17 Gram(s) Oral daily  senna 2 Tablet(s) Oral at bedtime  sodium chloride 0.9%. 1000 milliLiter(s) IV Continuous <Continuous>      Labs:                         11.0   6.65  )-----------( 349      ( 04 May 2024 02:59 )             31.3       05-04    137  |  99  |  11  ----------------------------<  310<H>  3.7   |  18<L>  |  0.78    Ca    8.7      04 May 2024 03:01  Phos  3.0     05-03  Mg     2.1     05-03    TPro  7.1  /  Alb  3.6  /  TBili  0.2  /  DBili  x   /  AST  20  /  ALT  22  /  AlkPhos  157<H>  05-03        Blood Bank: 05-04-24  A  --  Positive      Assessment/Plan:   This is a 54yo female  presents with SAH. Patient presents to neuro-IR for selective cerebral angiography, possible embolization. Procedure/ risks/ benefits/ goals/ alternatives were explained. Risks include but are not limited to stroke/ vessel injury/ hemorrhage/ groin hematoma. All questions answered. Informed content obtained from patient. Consent placed in chart.     Stephanie Arriola PA-C  [phone]

## 2024-05-04 NOTE — PRE-ANESTHESIA EVALUATION ADULT - NSANTHADDINFOFT_GEN_ALL_CORE
Risks and benefits of anesthesia discussed with patient - including headache, nausea/vomiting, sore throat, dental injury, and cardiopulmonary complications. All questions were answered.

## 2024-05-04 NOTE — CHART NOTE - NSCHARTNOTEFT_GEN_A_CORE
CAPRINI SCORE [CLOT] Score on Admission for     AGE RELATED RISK FACTORS                                                       MOBILITY RELATED FACTORS  [ x] Age 41-60 years                                            (1 Point)                  [ ] Bed rest                                                        (1 Point)  [ ] Age: 61-74 years                                           (2 Points)                 [ ] Plaster cast                                                   (2 Points)  [ ] Age= 75 years                                              (3 Points)                 [ ] Bed bound for more than 72 hours                 (2 Points)    DISEASE RELATED RISK FACTORS                                               GENDER SPECIFIC FACTORS  [ ] Edema in the lower extremities                       (1 Point)                  [ ] Pregnancy                                                     (1 Point)  [ ] Varicose veins                                               (1 Point)                  [ ] Post-partum < 6 weeks                                   (1 Point)             [x ] BMI > 25 Kg/m2                                            (1 Point)                  [ ] Hormonal therapy  or oral contraception          (1 Point)                 [ ] Sepsis (in the previous month)                        (1 Point)                  [ ] History of pregnancy complications                 (1 point)  [ ] Pneumonia or serious lung disease                                               [ ] Unexplained or recurrent                     (1 Point)           (in the previous month)                               (1 Point)  [ ] Abnormal pulmonary function test                     (1 Point)                 SURGERY RELATED RISK FACTORS (include planned surgeries)  [ ] Acute myocardial infarction                              (1 Point)                 [ ]  Section                                             (1 Point)  [ ] Congestive heart failure (in the previous month)  (1 Point)         [ ] Minor surgery                                                  (1 Point)   [ ] Inflammatory bowel disease                             (1 Point)                 [ ] Arthroscopic surgery                                        (2 Points)  [ ] Central venous access                                      (2 Points)                [ ] General surgery lasting more than 45 minutes   (2 Points)       [ ] Stroke (in the previous month)                          (5 Points)               [ ] Elective arthroplasty                                         (5 Points)            [ ] current or past malignancy                              (2 Points)                                                                                                       HEMATOLOGY RELATED FACTORS                                                 TRAUMA RELATED RISK FACTORS  [ ] Prior episodes of VTE                                     (3 Points)                [ ] Fracture of the hip, pelvis, or leg                       (5 Points)  [ ] Positive family history for VTE                         (3 Points)                 [ ] Acute spinal cord injury (in the previous month)  (5 Points)  [ ] Prothrombin 99781 A                                     (3 Points)                 [ ] Paralysis  (less than 1 month)                             (5 Points)  [ ] Factor V Leiden                                             (3 Points)                  [ ] Multiple Trauma within 1 month                        (5 Points)  [ ] Lupus anticoagulants                                     (3 Points)                                                           [ ] Anticardiolipin antibodies                               (3 Points)                                                       [ ] High homocysteine in the blood                      (3 Points)                                             [ ] Other congenital or acquired thrombophilia      (3 Points)                                                [ ] Heparin induced thrombocytopenia                  (3 Points)                                          Total Score [      2    ]    Risk:  Very low 0   Low 1 to 2   Moderate 3 to 4   High =5       VTE Prophylasix Recommednations:  [x ] mechanical pneumatic compression devices                                      [ ] contraindicated: _____________________  [ ] chemo prophylasix                                                                                   [ x] contraindicated _____________________    **** HIGH LIKELIHOOD DVT PRESENT ON ADMISSION  [ ] (please order LE dopplers within 24 hours of admission)

## 2024-05-04 NOTE — H&P ADULT - ASSESSMENT
Jessica Wolff  53F, no AC/AP, pmh diabetes, p/w severe sudden-onset bifrontal HA this AM with associated neck stiffness/blurry vision. CTH/CTA read as negative by radiologist but appears to have R perimes SAH (HH1, MF1). LP in ED w/ 45885 RBCs, and xanthochromic. Afebrile, WBC wnl. PLTs wnl, Coags wnl. Exam: intact    -Admit to NSCU for SAH protocol  -Keep in pre-op mode for possible angio  -A-line and SBP <140  -Keppra 500 BID  -MR brain/C-spine w/wo if angio neg.

## 2024-05-04 NOTE — PROGRESS NOTE ADULT - SUBJECTIVE AND OBJECTIVE BOX
HOSPITAL COURSE:  54 y/o female with PMHx significant for diabetes mellitus, not on any anti-thrombotics, presents with severe sudden onset bifrontal headache since thie AM with associated neck stiffness and blurry vision. CTH initially read as negative. LP done to rule out SAH which demonstrated 52851 RBCs and xanthochromia.     Admission Scores  GCS: 15  HH: 2   MF: 1  NIHSS: 0      24 hour Events: Patient admitted to NSCU for SAH protocol.     05/04: Angio: negative for aneurysm  5/4- No acute overnight events. Patient s/p angio- negative for vascular abnormalities. Examination stable.     Allergies    No Known Allergies    Intolerances        REVIEW OF SYSTEMS: [ ] Unable to Assess due to neurologic exam   [x ] All ROS addressed below are non-contributory, except:  Neuro: [ ] Headache [ ] Back pain [ ] Numbness [ ] Weakness [ ] Ataxia [ ] Dizziness [ ] Aphasia [ ] Dysarthria [ ] Visual disturbance  Resp: [ ] Shortness of breath/dyspnea, [ ] Orthopnea [ ] Cough  CV: [ ] Chest pain [ ] Palpitation [ ] Lightheadedness [ ] Syncope  Renal: [ ] Thirst [ ] Edema  GI: [ ] Nausea [ ] Emesis [ ] Abdominal pain [ ] Constipation [ ] Diarrhea  Hem: [ ] Hematemesis [ ] bright red blood per rectum  ID: [ ] Fever [ ] Chills [ ] Dysuria  ENT: [ ] Rhinorrhea      DEVICES:   [ ] Restraints [ ] ET tube [ ] central line [ ] arterial line [ ] torres [ ] NGT/OGT [ ] EVD [ ] LD [ ] ROXANNA/HMV [ ] Trach [ ] PEG [ ] Chest Tube        MEDICATION LEVELS:     IVF FLUIDS/MEDICATIONS:   MEDICATIONS  (STANDING):  levETIRAcetam 500 milliGRAM(s) Oral two times a day  niCARdipine Infusion 5 mG/Hr (25 mL/Hr) IV Continuous <Continuous>  niMODipine 60 milliGRAM(s) Oral every 4 hours  polyethylene glycol 3350 17 Gram(s) Oral daily  senna 2 Tablet(s) Oral at bedtime  sodium chloride 0.9%. 1000 milliLiter(s) (75 mL/Hr) IV Continuous <Continuous>        IMAGING:      EXAMINATION:  PHYSICAL EXAM:    Constitutional: No Acute Distress     Neurological: Awake, alert oriented to person, place and time, Following Commands, PERRL, EOMI, No Gaze Preference, Face Symmetrical, Speech Fluent, No dysmetria, No ataxia, No nystagmus     Motor exam:          Upper extremity                         Delt     Bicep     Tricep    HG                                                 R         5/5 5/5 5/5 5/5                                               L          5/5 5/5 5/5 5/5          Lower extremity                        HF         KF        KE       DF         PF                                                  R        5/5 5/5 5/5 5/5 5/5                                               L         5/5 5/5 5/5 5/5 5/5                                                 Sensation: [x] intact to light touch  [ ] decreased:     Pulmonary: Clear to Auscultation, No rales, No rhonchi, No wheezes     Cardiovascular: S1, S2, Regular rate and rhythm     Gastrointestinal: Soft, Non-tender, Non-distended     Extremities: No calf tenderness     Incision:

## 2024-05-04 NOTE — CONSULT NOTE ADULT - SUBJECTIVE AND OBJECTIVE BOX
HPI:  Jessica Wolff  53F, no AC/AP, pmh diabetes, p/w severe sudden-onset bifrontal HA this AM with associated neck stiffness/blurry vision. CTH/CTA read as negative by radiologist but appears to have R perimes SAH (HH1, MF1). LP in ED w/ 66085 RBCs, and xanthochromic. Afebrile, WBC wnl. PLTs wnl, Coags wnl.      (04 May 2024 01:26)      Endocrinology HPI    Diabetes Mellitus Type 2  Diagnosis: >10 years ago   Symptoms: Denies any polyuria, polydipsia   Outpatient endocrinologist: Follows with PCP   Last HgbA1c: none in the system   Outpatient regimen: Novolin 14-20 units in AM and 14-20 units in PM  Compliance: Unclear- does mention that at times she skips here insulin   Blood sugars at home: Variable between   Inpatient regimen: insulin gtt  FH: mother with type 2 DM  Tobacco, etoh, drug use: Denies   History of CAD/MI/Stroke: patient presenting now with SAH    Review of Systems:  Constitutional: No fever, good appetite/po intake  Eyes: No blurry vision, diplopia  Neuro: No tremors  HEENT: No pain  Cardiovascular: No chest pain, palpitations  Respiratory: No SOB, no cough  GI: No nausea, vomiting,   : No dysuria, hematuria  Skin: no rash  Psych: no depression  Endocrine: no polyuria, polydipsia  Hem/lymph: no swelling  Osteoporosis: no fractures    ALL OTHER SYSTEMS REVIEWED AND NEGATIVE    PHYSICAL EXAM:  VITALS: T(C): 36.8 (05-04-24 @ 11:00)  T(F): 98.2 (05-04-24 @ 11:00), Max: 99.9 (05-03-24 @ 15:45)  HR: 67 (05-04-24 @ 11:00) (67 - 99)  BP: 146/63 (05-04-24 @ 08:00) (114/74 - 178/90)  RR:  (12 - 20)  SpO2:  (95% - 100%)  Wt(kg): --  GENERAL: NAD, well-groomed, well-developed  EYES: No proptosis, extraocular movements intact,  no lid lag, anicteric  HEENT:  Atraumatic, Normocephalic, moist mucous membranes  THYROID: Normal size, no palpable nodules, no thyromegaly  RESPIRATORY: Clear to auscultation bilaterally; No rales, rhonchi, wheezing, or rubs  CARDIOVASCULAR: Regular rate and rhythm; No murmurs; no peripheral edema  GI: Soft, nontender, non distended, normal bowel sounds  SKIN: Dry, intact, No rashes or lesions  EXTREMITIES: No foot ulcers, distal pedal pulses intact bilaterally  NEURO: sensation intact, no tremors  PSYCH: reactive affect, euthymic mood  CUSHING'S SIGNS: no striae or visible bruising                              11.0   6.65  )-----------( 349      ( 04 May 2024 02:59 )             31.3       05-04    137  |  99  |  11  ----------------------------<  310<H>  3.7   |  18<L>  |  0.78    eGFR: 91    Ca    8.7      05-04  Mg     2.1     05-03  Phos  3.0     05-03    TPro  7.1  /  Alb  3.6  /  TBili  0.2  /  DBili  x   /  AST  20  /  ALT  22  /  AlkPhos  157<H>  05-03      Thyroid Function Tests:  05-04 @ 02:59 TSH 2.44 FreeT4 1.1 T3 60 Anti TPO -- Anti Thyroglobulin Ab -- TSI --      05-04 Chol 389<H> Direct LDL -- LDL calculated 274<H> HDL 73 Trig 203<H>    Radiology:

## 2024-05-04 NOTE — OCCUPATIONAL THERAPY INITIAL EVALUATION ADULT - SHORT TERM MEMORY, REHAB EVAL
pt states she has been having difficulty with short term memory, does delay recall 3/5 words/impaired

## 2024-05-04 NOTE — OCCUPATIONAL THERAPY INITIAL EVALUATION ADULT - LIGHT TOUCH SENSATION, RUE, REHAB EVAL
The patient underwent successful monoclonal antibody treatment and is feeling better  He looks quite well on virtual exam today  He feels all of his symptoms have improved  He will contact me with any worsening symptoms  I did recommend follow-up with either myself or his primary care provider for his chronic health issues  within normal limits

## 2024-05-04 NOTE — PROGRESS NOTE ADULT - SUBJECTIVE AND OBJECTIVE BOX
HOSPITAL COURSE:  52 y/o female with PMHx significant for diabetes mellitus, not on any anti-thrombotics, presents with severe sudden onset bifrontal headache since thie AM with associated neck stiffness and blurry vision. CTH initially read as negative. LP done to rule out SAH which demonstrated 15089 RBCs and xanthochromia.     Admission Scores  GCS: 15  HH: 2   MF: 1  NIHSS: 0      24 hour Events: Patient admitted to NSCU for SAH protocol.       Allergies    No Known Allergies    Intolerances        REVIEW OF SYSTEMS: [ ] Unable to Assess due to neurologic exam   [ ] All ROS addressed below are non-contributory, except:  Neuro: [ ] Headache [ ] Back pain [ ] Numbness [ ] Weakness [ ] Ataxia [ ] Dizziness [ ] Aphasia [ ] Dysarthria [ ] Visual disturbance  Resp: [ ] Shortness of breath/dyspnea, [ ] Orthopnea [ ] Cough  CV: [ ] Chest pain [ ] Palpitation [ ] Lightheadedness [ ] Syncope  Renal: [ ] Thirst [ ] Edema  GI: [ ] Nausea [ ] Emesis [ ] Abdominal pain [ ] Constipation [ ] Diarrhea  Hem: [ ] Hematemesis [ ] bright red blood per rectum  ID: [ ] Fever [ ] Chills [ ] Dysuria  ENT: [ ] Rhinorrhea      DEVICES:   [ ] Restraints [ ] ET tube [ ] central line [ ] arterial line [ ] torres [ ] NGT/OGT [ ] EVD [ ] LD [ ] ROXANNA/HMV [ ] Trach [ ] PEG [ ] Chest Tube     VITALS:   Vital Signs Last 24 Hrs  T(C): 37 (03 May 2024 19:33), Max: 37.7 (03 May 2024 15:45)  T(F): 98.6 (03 May 2024 19:33), Max: 99.9 (03 May 2024 15:45)  HR: 84 (04 May 2024 00:24) (83 - 96)  BP: 124/79 (04 May 2024 00:24) (124/79 - 165/70)  BP(mean): 97 (04 May 2024 00:24) (79 - 109)  RR: 18 (04 May 2024 00:24) (16 - 18)  SpO2: 98% (04 May 2024 00:24) (98% - 100%)    Parameters below as of 04 May 2024 00:24  Patient On (Oxygen Delivery Method): room air      CAPILLARY BLOOD GLUCOSE      POCT Blood Glucose.: 277 mg/dL (03 May 2024 15:31)    I&O's Summary      Respiratory:        LABS:                        12.3   6.00  )-----------( 374      ( 03 May 2024 16:10 )             35.3     05-03    138  |  96  |  15  ----------------------------<  286<H>  3.9   |  28  |  0.78             MEDICATION LEVELS:     IVF FLUIDS/MEDICATIONS:   MEDICATIONS  (STANDING):  levETIRAcetam 500 milliGRAM(s) Oral two times a day  niCARdipine Infusion 5 mG/Hr (25 mL/Hr) IV Continuous <Continuous>  niMODipine 60 milliGRAM(s) Oral every 4 hours  polyethylene glycol 3350 17 Gram(s) Oral daily  senna 2 Tablet(s) Oral at bedtime  sodium chloride 0.9%. 1000 milliLiter(s) (75 mL/Hr) IV Continuous <Continuous>    MEDICATIONS  (PRN):        IMAGING:      EXAMINATION:  PHYSICAL EXAM:    Constitutional: No Acute Distress     Neurological: Awake, alert oriented to person, place and time, Following Commands, PERRL, EOMI, No Gaze Preference, Face Symmetrical, Speech Fluent, No dysmetria, No ataxia, No nystagmus     Motor exam:          Upper extremity                         Delt     Bicep     Tricep    HG                                                 R         5/5        5/5        5/5       5/5                                               L          5/5        5/5        5/5       5/5          Lower extremity                        HF         KF        KE       DF         PF                                                  R        5/5        5/5        5/5       5/5         5/5                                               L         5/5        5/5       5/5       5/5          5/5                                                 Sensation: [ ] intact to light touch  [ ] decreased:     Reflexes: Deep Tendon Reflexes Intact     Pulmonary: Clear to Auscultation, No rales, No rhonchi, No wheezes     Cardiovascular: S1, S2, Regular rate and rhythm     Gastrointestinal: Soft, Non-tender, Non-distended     Extremities: No calf tenderness     Incision:    HOSPITAL COURSE:  52 y/o female with PMHx significant for diabetes mellitus, not on any anti-thrombotics, presents with severe sudden onset bifrontal headache since thie AM with associated neck stiffness and blurry vision. CTH initially read as negative. LP done to rule out SAH which demonstrated 34532 RBCs and xanthochromia.     Admission Scores  GCS: 15  HH: 2   MF: 1  NIHSS: 0      24 hour Events: Patient admitted to NSCU for SAH protocol.     05/04: Angio: negative for aneurysm    Allergies    No Known Allergies    Intolerances        REVIEW OF SYSTEMS: [ ] Unable to Assess due to neurologic exam   [ ] All ROS addressed below are non-contributory, except:  Neuro: [ ] Headache [ ] Back pain [ ] Numbness [ ] Weakness [ ] Ataxia [ ] Dizziness [ ] Aphasia [ ] Dysarthria [ ] Visual disturbance  Resp: [ ] Shortness of breath/dyspnea, [ ] Orthopnea [ ] Cough  CV: [ ] Chest pain [ ] Palpitation [ ] Lightheadedness [ ] Syncope  Renal: [ ] Thirst [ ] Edema  GI: [ ] Nausea [ ] Emesis [ ] Abdominal pain [ ] Constipation [ ] Diarrhea  Hem: [ ] Hematemesis [ ] bright red blood per rectum  ID: [ ] Fever [ ] Chills [ ] Dysuria  ENT: [ ] Rhinorrhea      DEVICES:   [ ] Restraints [ ] ET tube [ ] central line [ ] arterial line [ ] torres [ ] NGT/OGT [ ] EVD [ ] LD [ ] ROXANNA/HMV [ ] Trach [ ] PEG [ ] Chest Tube     VITALS:   Vital Signs Last 24 Hrs  T(C): 37 (03 May 2024 19:33), Max: 37.7 (03 May 2024 15:45)  T(F): 98.6 (03 May 2024 19:33), Max: 99.9 (03 May 2024 15:45)  HR: 84 (04 May 2024 00:24) (83 - 96)  BP: 124/79 (04 May 2024 00:24) (124/79 - 165/70)  BP(mean): 97 (04 May 2024 00:24) (79 - 109)  RR: 18 (04 May 2024 00:24) (16 - 18)  SpO2: 98% (04 May 2024 00:24) (98% - 100%)    Parameters below as of 04 May 2024 00:24  Patient On (Oxygen Delivery Method): room air      CAPILLARY BLOOD GLUCOSE      POCT Blood Glucose.: 277 mg/dL (03 May 2024 15:31)    I&O's Summary      Respiratory:        LABS:                        12.3   6.00  )-----------( 374      ( 03 May 2024 16:10 )             35.3     05-03    138  |  96  |  15  ----------------------------<  286<H>  3.9   |  28  |  0.78             MEDICATION LEVELS:     IVF FLUIDS/MEDICATIONS:   MEDICATIONS  (STANDING):  levETIRAcetam 500 milliGRAM(s) Oral two times a day  niCARdipine Infusion 5 mG/Hr (25 mL/Hr) IV Continuous <Continuous>  niMODipine 60 milliGRAM(s) Oral every 4 hours  polyethylene glycol 3350 17 Gram(s) Oral daily  senna 2 Tablet(s) Oral at bedtime  sodium chloride 0.9%. 1000 milliLiter(s) (75 mL/Hr) IV Continuous <Continuous>    MEDICATIONS  (PRN):        IMAGING:      EXAMINATION:  PHYSICAL EXAM:    Constitutional: No Acute Distress     Neurological: Awake, alert oriented to person, place and time, Following Commands, PERRL, EOMI, No Gaze Preference, Face Symmetrical, Speech Fluent, No dysmetria, No ataxia, No nystagmus     Motor exam:          Upper extremity                         Delt     Bicep     Tricep    HG                                                 R         5/5        5/5        5/5       5/5                                               L          5/5        5/5        5/5       5/5          Lower extremity                        HF         KF        KE       DF         PF                                                  R        5/5        5/5        5/5       5/5         5/5                                               L         5/5        5/5       5/5       5/5          5/5                                                 Sensation: [ ] intact to light touch  [ ] decreased:     Reflexes: Deep Tendon Reflexes Intact     Pulmonary: Clear to Auscultation, No rales, No rhonchi, No wheezes     Cardiovascular: S1, S2, Regular rate and rhythm     Gastrointestinal: Soft, Non-tender, Non-distended     Extremities: No calf tenderness     Incision:
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
## 2024-05-04 NOTE — PATIENT PROFILE ADULT - DOES PATIENT HAVE ADVANCE DIRECTIVE
Progress Note    Patient: Dada Lacey Date: 2021   male, 74 year old  Admit Date: 2021   Attending: Danitza Diaz MD                  SUBJECTIVE:  Patient seen and examined by me in follow up for Hypoxia .  Patient states that he did not sleep. No chest pain and SOB. No fever or chills.  Wife at the bedside.  Echo revealed severe pulmonary severe hypertension with severe MR. Cardiology seen and evaluated.    MEDICATIONS: Personally reviewed today in this patient's active orders section of Epic.  • sodium chloride (PF)  2 mL Intracatheter 2 times per day   • levothyroxine  50 mcg Oral Daily   • enoxaparin  40 mg Subcutaneous Daily   • Potassium Standard Replacement Protocol   Does not apply See Admin Instructions   • Magnesium Standard Replacement Protocol   Does not apply See Admin Instructions   • cefTRIAXone (ROCEPHIN) IV  2,000 mg Intravenous Daily     ALLERGIES: Personally reviewed today in Epic.  ROS: Pertinent systems negative except as above.    PHYSICAL EXAM:      Vital 24 Hour Range Most Recent Value   Temperature Temp  Min: 97.3 °F (36.3 °C)  Max: 98 °F (36.7 °C) 97.6 °F (36.4 °C)   Pulse Pulse  Min: 93  Max: 115 (!) 107   Respiratory Resp  Min: 16  Max: 18 16   Blood Pressure BP  Min: 104/62  Max: 116/74 105/66   Pulse Oximetry SpO2  Min: 92 %  Max: 96 % 95 %   Arterial BP No data recorded     O2 O2 Flow Rate (L/min)  Av L/min  Min: 2 L/min   Min taken time: 21 1409  Max: 2 L/min   Max taken time: 21 1409       Vital Most Recent Value First Value   Weight 69.9 kg Weight: 72.6 kg   Height 6' (182.9 cm) Height: 6' (182.9 cm)   BMI 20.9 N/A     General: no apparent distress and loss of subcutaneous fat  CV: regular rate and rhythm  Resp: clear to auscultation bilaterally  Abd: soft, nontender, nondistended and bowel sounds  present  Ext: no rashes, lesions, or ulcers noted and pitting edema present bilateral lower extremities      LABS:  Recent Labs   Lab 21  6647  05/07/21  1540 05/07/21  0540 05/06/21  1358   WBC 12.2*  --  11.2* 16.7*   RBC 3.15*  --  2.65* 3.05*   HGB 8.8* 9.9* 7.4* 8.5*   HCT 30.0* 34.0* 25.4* 29.8*     --  304 411     Recent Labs   Lab 05/08/21  0554 05/07/21  0540 05/06/21  1541 05/06/21  1450 05/06/21  1358   SODIUM 136 136  --   --  135   POTASSIUM 3.8 4.6  --   --  4.0   CHLORIDE 101 103  --   --  101   CO2 25 24  --   --  22   BUN 21* 19  --   --  17   CREATININE 1.58* 1.74*  --  1.90* 1.70*   GLUCOSE 95 87  --   --  96   CALCIUM 8.1* 8.3*  --   --  8.2*   ALBUMIN  --   --   --   --  4.0   AST  --   --   --   --  47*   GPT  --   --   --   --  11   BILIRUBIN  --   --   --   --  0.8   ALKPT  --   --   --   --  43*   INR  --   --  1.2  --   --        Radiology:  CT Chest PE Imaging    Result Date: 5/6/2021  CT ANGIOGRAM CHEST PE IMAGING- 3D HISTORY: PE suspected, high prob. COMPARISON: 5/9/2017 TECHNIQUE:  Helical CT angiogram of the chest after administration of 75 mL Omnipaque 350 contrast per pulmonary embolus protocol.  3D reconstruction and 2D multiplanar and multiplanar reformations were reviewed. FINDINGS: QUALITY: Good. VASCULAR PE ASSESSMENT: No evidence for pulmonary embolism. No CT signs of right heart strain. VESSELS: Mildly dilated ascending aorta measuring 43 mm. Normal-sized pulmonary arteries. Mild aortic atherosclerosis. HEART/CORONARIES: Moderate coronary calcification. No pericardial effusion or thickening. Enlargement of right atrium and ventricle. CHEST NECK BASE: Within normal limits. MEDIASTINUM/AXILLAE: No enlarged lymph nodes or masses. ESOPHAGUS: Moderately patulous esophagus with mild fluid debris LUNGS, PLEURA: Mild subpleural groundglass opacities in both lower lobes mainly along the dependent aspect. 10 mm groundglass nodule in the left lower lobe superior segment on 3/65 No pleural effusion/pneumothorax. TRACHEA, BRONCHI:Unremarkable. UPPER ABDOMEN: Moderate splenomegaly BONES/SOFT TISSUES: No suspicious lesion.  Mild degenerative changes in thoracic spine.     IMPRESSION: 1.  Negative for acute pulmonary embolism. 2.  Incidental 10 mm left lower lobe groundglass nodule. Three-month follow-up is suggested. 3.  Mild dependent groundglass opacities likely atelectasis. 4.  Moderate dilatation of right atrium and ventricle. 5.  Moderate coronary atherosclerosis. 6.  Moderately patulous esophagus with mild fluid debris. 7.  Moderate splenomegaly.      Central line:    Available Intravenous Access     PICC Line / CVC Line / PIV Line / Intraosseous Line / Line / UAC Line            Peripheral IV 05/06/21 Left Antecubital 20 2 days                 Blood Cultures:   No results found for this or any previous visit.       Active Hospital Problems    Diagnosis    • Elevated brain natriuretic peptide (BNP) level    • Elevated troponin level    • Myelodysplasia (myelodysplastic syndrome) (CMS/HCC)    • Hypoxia    • SANTOS (dyspnea on exertion)    • Acquired hypothyroidism    • Anemia      ECHOCARDIOGRAM    1. Normal left ventricular cavity size. Moderately increased left ventricular wall thickness. Normal left ventricular systolic function. Left ventricular ejection fraction, 60 %. No regional wall motion abnormalities.  2. Severely increased right ventricular size. Severely decreased right ventricular systolic function.  3. Moderately thickened mitral valve. Eccentric posteriorly directed mitral regurgitation jet. Severe mitral valve regurgitation.  4. Aortic valve sclerosis. No aortic valve stenosis. Mild to moderate aortic valve regurgitation.  5. Moderate tricuspid valve regurgitation. Right ventricular systolic pressure 73.9 mmHg.  6. No prior study available for comparison.    ASSESSMENT AND PLAN:     Acute respiratory failure with hypoxia  Likely secondary to acute decompensated congestive heart failure with preserved EF probably due to severe mitral regurgitation versus severe pulmonary hypertension versus community-acquired  pneumonia  Patient presented with shortness of breath, dyspnea on exertion and lateral lower extremity edema and chronic cough  Noted to significantly elevated BNP 18,653 and leukocytosis  CT of the chest showed no PE but revealed mild subpleural groundglass opacities in both lower lobes mainly along the dependent aspect. 10 mm groundglass nodule in the left lower lobe.  COVID-19 negative  Continue oxygen supplement  Continue IV antibiotics and Lasix  Monitor strict input output and daily a.m. weights  Telemetry monitoring  Echocardiogram normal left ventricular systolic function with EF of 60% no regional wall motion abnormalities but showed severely increased right ventricular size and severely decreased right ventricular systolic function with right ventricular systolic pressure 73.9 and severe mitral regurgitation  Blood culture  NGTD and urine Legionella and strep antigen negative    Severe mitral regurgitation  Patient seen and evaluated by Cardiology  Noted candidate for surgical intervention due to comorbidity including MDS  Continue diuresis with IV Lasix    Severe pulmonary hypertension  Echo showed right ventricular systolic pressure 73.9 mm hg  Diurese patient with IV Lasix  Oxygen supplement  Monitor input and output and BMP    Elevated troponin  Patient has mildly elevated troponin  Likely secondary to demand ischemia from decompensated CHF and severe pulmonary hypertension  Patient denies any chest pain  Monitor patient to telemetry  Troponin peaked at 0.07    Nonsustained V-tach  Asymptomatic  Will keep K>4 Mag>2  Monitor patient on telemetry  Echocardiogram see above    UTI  Urinalysis suggestive of UTI   Urine culture [id number] coag-negative staphylococci  Continue antibiotics as above    Acute on chronic normocytic anemia  In a patient with history of myelodysplastic syndrome  Follows with Hematology and Oncology on [place].  S/P1 unit of PRBC   Monitor CBC    Chronic kidney disease stage  3  Creatinine at baseline  Monitor urine output and BMP  Avoid nephrotoxins    Hypothyroidism  Will follow-up with TSH  Resume thyroid supplement    Lung nodule  CT scan of the chest showed incidental 10 mm left lower lobe groundglass nodule  Outpatient follow-up with PCP with repeat CT scan    Smoking status- non smoker    Nutrition status- severe protein energy malnutrition  Nutrition consult    Body mass index is 20.9 kg/m². - appropriate    DVT Prophylaxis - Lovenox prophylactic dosing (dose adjusted as per renal function)      Disposition:  Pending clinical improvement. The patients treatment plans were discussed with patient and family and RN.    I personally spent 35 minutes today in the care and management of this patient and more than 50% of my time was spent in counseling and coordination of care.     Danitza Diaz MD  Hospitalist  5/8/2021  3:10 PM   No

## 2024-05-04 NOTE — OCCUPATIONAL THERAPY INITIAL EVALUATION ADULT - ORIENTATION, REHAB EVAL
Pt scored a 12/28 on SBT demonstrating cognitive impairment/oriented to person, place, time and situation

## 2024-05-04 NOTE — H&P ADULT - HISTORY OF PRESENT ILLNESS
Jessica Wolff  53F, no AC/AP, pmh diabetes, p/w severe sudden-onset bifrontal HA this AM with associated neck stiffness/blurry vision. CTH/CTA read as negative by radiologist but appears to have R perimes SAH (HH1, MF1). LP in ED w/ 33764 RBCs, and xanthochromic. Afebrile, WBC wnl. PLTs wnl, Coags wnl.

## 2024-05-04 NOTE — PROGRESS NOTE ADULT - SUBJECTIVE AND OBJECTIVE BOX
HOSPITAL COURSE:  54 y/o female with PMHx significant for diabetes mellitus, not on any anti-thrombotics, presents with severe sudden onset bifrontal headache since thie AM with associated neck stiffness and blurry vision. CTH initially read as negative. LP done to rule out SAH which demonstrated 13826 RBCs and xanthochromia.     Admission Scores  GCS: 15  HH: 2   MF: 1  NIHSS: 0      24 hour Events: Patient admitted to NSCU for SAH protocol.       Allergies    No Known Allergies    Intolerances        REVIEW OF SYSTEMS: [ ] Unable to Assess due to neurologic exam   [ ] All ROS addressed below are non-contributory, except:  Neuro: [ ] Headache [ ] Back pain [ ] Numbness [ ] Weakness [ ] Ataxia [ ] Dizziness [ ] Aphasia [ ] Dysarthria [ ] Visual disturbance  Resp: [ ] Shortness of breath/dyspnea, [ ] Orthopnea [ ] Cough  CV: [ ] Chest pain [ ] Palpitation [ ] Lightheadedness [ ] Syncope  Renal: [ ] Thirst [ ] Edema  GI: [ ] Nausea [ ] Emesis [ ] Abdominal pain [ ] Constipation [ ] Diarrhea  Hem: [ ] Hematemesis [ ] bright red blood per rectum  ID: [ ] Fever [ ] Chills [ ] Dysuria  ENT: [ ] Rhinorrhea      DEVICES:   [ ] Restraints [ ] ET tube [ ] central line [ ] arterial line [ ] torres [ ] NGT/OGT [ ] EVD [ ] LD [ ] ROXANNA/HMV [ ] Trach [ ] PEG [ ] Chest Tube     VITALS:   Vital Signs Last 24 Hrs  T(C): 37 (03 May 2024 19:33), Max: 37.7 (03 May 2024 15:45)  T(F): 98.6 (03 May 2024 19:33), Max: 99.9 (03 May 2024 15:45)  HR: 84 (04 May 2024 00:24) (83 - 96)  BP: 124/79 (04 May 2024 00:24) (124/79 - 165/70)  BP(mean): 97 (04 May 2024 00:24) (79 - 109)  RR: 18 (04 May 2024 00:24) (16 - 18)  SpO2: 98% (04 May 2024 00:24) (98% - 100%)    Parameters below as of 04 May 2024 00:24  Patient On (Oxygen Delivery Method): room air      CAPILLARY BLOOD GLUCOSE      POCT Blood Glucose.: 277 mg/dL (03 May 2024 15:31)    I&O's Summary      Respiratory:        LABS:                        12.3   6.00  )-----------( 374      ( 03 May 2024 16:10 )             35.3     05-03    138  |  96  |  15  ----------------------------<  286<H>  3.9   |  28  |  0.78             MEDICATION LEVELS:     IVF FLUIDS/MEDICATIONS:   MEDICATIONS  (STANDING):  levETIRAcetam 500 milliGRAM(s) Oral two times a day  niCARdipine Infusion 5 mG/Hr (25 mL/Hr) IV Continuous <Continuous>  niMODipine 60 milliGRAM(s) Oral every 4 hours  polyethylene glycol 3350 17 Gram(s) Oral daily  senna 2 Tablet(s) Oral at bedtime  sodium chloride 0.9%. 1000 milliLiter(s) (75 mL/Hr) IV Continuous <Continuous>    MEDICATIONS  (PRN):        IMAGING:      EXAMINATION:  PHYSICAL EXAM:    Constitutional: No Acute Distress     Neurological: Awake, alert oriented to person, place and time, Following Commands, PERRL, EOMI, No Gaze Preference, Face Symmetrical, Speech Fluent, No dysmetria, No ataxia, No nystagmus     Motor exam:          Upper extremity                         Delt     Bicep     Tricep    HG                                                 R         5/5        5/5        5/5       5/5                                               L          5/5        5/5        5/5       5/5          Lower extremity                        HF         KF        KE       DF         PF                                                  R        5/5        5/5        5/5       5/5         5/5                                               L         5/5        5/5       5/5       5/5          5/5                                                 Sensation: [ ] intact to light touch  [ ] decreased:     Reflexes: Deep Tendon Reflexes Intact     Pulmonary: Clear to Auscultation, No rales, No rhonchi, No wheezes     Cardiovascular: S1, S2, Regular rate and rhythm     Gastrointestinal: Soft, Non-tender, Non-distended     Extremities: No calf tenderness     Incision:

## 2024-05-04 NOTE — OCCUPATIONAL THERAPY INITIAL EVALUATION ADULT - ADDITIONAL COMMENTS
pt lives with her  in a private home w/ no steps to enter and none inside; pt has a tub shower; owns no AE/DME; PTA pt was independent with all ADLs and mobility.

## 2024-05-05 LAB
ANION GAP SERPL CALC-SCNC: 10 MMOL/L — SIGNIFICANT CHANGE UP (ref 5–17)
ANION GAP SERPL CALC-SCNC: 11 MMOL/L — SIGNIFICANT CHANGE UP (ref 5–17)
ANION GAP SERPL CALC-SCNC: 12 MMOL/L — SIGNIFICANT CHANGE UP (ref 5–17)
B-OH-BUTYR SERPL-SCNC: 0.2 MMOL/L — SIGNIFICANT CHANGE UP
B-OH-BUTYR SERPL-SCNC: 1.6 MMOL/L — HIGH
BASOPHILS # BLD AUTO: 0.04 K/UL — SIGNIFICANT CHANGE UP (ref 0–0.2)
BASOPHILS NFR BLD AUTO: 0.6 % — SIGNIFICANT CHANGE UP (ref 0–2)
BUN SERPL-MCNC: 11 MG/DL — SIGNIFICANT CHANGE UP (ref 7–23)
BUN SERPL-MCNC: 14 MG/DL — SIGNIFICANT CHANGE UP (ref 7–23)
BUN SERPL-MCNC: 20 MG/DL — SIGNIFICANT CHANGE UP (ref 7–23)
CALCIUM SERPL-MCNC: 8.2 MG/DL — LOW (ref 8.4–10.5)
CALCIUM SERPL-MCNC: 8.3 MG/DL — LOW (ref 8.4–10.5)
CALCIUM SERPL-MCNC: 8.5 MG/DL — SIGNIFICANT CHANGE UP (ref 8.4–10.5)
CHLORIDE SERPL-SCNC: 103 MMOL/L — SIGNIFICANT CHANGE UP (ref 96–108)
CHLORIDE SERPL-SCNC: 103 MMOL/L — SIGNIFICANT CHANGE UP (ref 96–108)
CHLORIDE SERPL-SCNC: 107 MMOL/L — SIGNIFICANT CHANGE UP (ref 96–108)
CO2 SERPL-SCNC: 17 MMOL/L — LOW (ref 22–31)
CO2 SERPL-SCNC: 18 MMOL/L — LOW (ref 22–31)
CO2 SERPL-SCNC: 20 MMOL/L — LOW (ref 22–31)
CREAT SERPL-MCNC: 1.02 MG/DL — SIGNIFICANT CHANGE UP (ref 0.5–1.3)
CREAT SERPL-MCNC: 1.09 MG/DL — SIGNIFICANT CHANGE UP (ref 0.5–1.3)
CREAT SERPL-MCNC: 1.62 MG/DL — HIGH (ref 0.5–1.3)
EGFR: 38 ML/MIN/1.73M2 — LOW
EGFR: 61 ML/MIN/1.73M2 — SIGNIFICANT CHANGE UP
EGFR: 66 ML/MIN/1.73M2 — SIGNIFICANT CHANGE UP
EOSINOPHIL # BLD AUTO: 0.09 K/UL — SIGNIFICANT CHANGE UP (ref 0–0.5)
EOSINOPHIL NFR BLD AUTO: 1.4 % — SIGNIFICANT CHANGE UP (ref 0–6)
GLUCOSE BLDC GLUCOMTR-MCNC: 154 MG/DL — HIGH (ref 70–99)
GLUCOSE BLDC GLUCOMTR-MCNC: 205 MG/DL — HIGH (ref 70–99)
GLUCOSE BLDC GLUCOMTR-MCNC: 236 MG/DL — HIGH (ref 70–99)
GLUCOSE BLDC GLUCOMTR-MCNC: 276 MG/DL — HIGH (ref 70–99)
GLUCOSE BLDC GLUCOMTR-MCNC: 322 MG/DL — HIGH (ref 70–99)
GLUCOSE BLDC GLUCOMTR-MCNC: 367 MG/DL — HIGH (ref 70–99)
GLUCOSE SERPL-MCNC: 134 MG/DL — HIGH (ref 70–99)
GLUCOSE SERPL-MCNC: 244 MG/DL — HIGH (ref 70–99)
GLUCOSE SERPL-MCNC: 318 MG/DL — HIGH (ref 70–99)
HCT VFR BLD CALC: 26.7 % — LOW (ref 34.5–45)
HCT VFR BLD CALC: 27.9 % — LOW (ref 34.5–45)
HGB BLD-MCNC: 9 G/DL — LOW (ref 11.5–15.5)
HGB BLD-MCNC: 9.3 G/DL — LOW (ref 11.5–15.5)
IMM GRANULOCYTES NFR BLD AUTO: 0.5 % — SIGNIFICANT CHANGE UP (ref 0–0.9)
LYMPHOCYTES # BLD AUTO: 1.76 K/UL — SIGNIFICANT CHANGE UP (ref 1–3.3)
LYMPHOCYTES # BLD AUTO: 27.1 % — SIGNIFICANT CHANGE UP (ref 13–44)
MAGNESIUM SERPL-MCNC: 2.1 MG/DL — SIGNIFICANT CHANGE UP (ref 1.6–2.6)
MAGNESIUM SERPL-MCNC: 2.4 MG/DL — SIGNIFICANT CHANGE UP (ref 1.6–2.6)
MCHC RBC-ENTMCNC: 28.5 PG — SIGNIFICANT CHANGE UP (ref 27–34)
MCHC RBC-ENTMCNC: 28.7 PG — SIGNIFICANT CHANGE UP (ref 27–34)
MCHC RBC-ENTMCNC: 33.3 GM/DL — SIGNIFICANT CHANGE UP (ref 32–36)
MCHC RBC-ENTMCNC: 33.7 GM/DL — SIGNIFICANT CHANGE UP (ref 32–36)
MCV RBC AUTO: 85 FL — SIGNIFICANT CHANGE UP (ref 80–100)
MCV RBC AUTO: 85.6 FL — SIGNIFICANT CHANGE UP (ref 80–100)
MONOCYTES # BLD AUTO: 0.44 K/UL — SIGNIFICANT CHANGE UP (ref 0–0.9)
MONOCYTES NFR BLD AUTO: 6.8 % — SIGNIFICANT CHANGE UP (ref 2–14)
NEUTROPHILS # BLD AUTO: 4.14 K/UL — SIGNIFICANT CHANGE UP (ref 1.8–7.4)
NEUTROPHILS NFR BLD AUTO: 63.6 % — SIGNIFICANT CHANGE UP (ref 43–77)
NRBC # BLD: 0 /100 WBCS — SIGNIFICANT CHANGE UP (ref 0–0)
NRBC # BLD: 0 /100 WBCS — SIGNIFICANT CHANGE UP (ref 0–0)
PHOSPHATE SERPL-MCNC: 2.3 MG/DL — LOW (ref 2.5–4.5)
PHOSPHATE SERPL-MCNC: 3.9 MG/DL — SIGNIFICANT CHANGE UP (ref 2.5–4.5)
PLATELET # BLD AUTO: 284 K/UL — SIGNIFICANT CHANGE UP (ref 150–400)
PLATELET # BLD AUTO: 314 K/UL — SIGNIFICANT CHANGE UP (ref 150–400)
POTASSIUM SERPL-MCNC: 3.8 MMOL/L — SIGNIFICANT CHANGE UP (ref 3.5–5.3)
POTASSIUM SERPL-MCNC: 4 MMOL/L — SIGNIFICANT CHANGE UP (ref 3.5–5.3)
POTASSIUM SERPL-MCNC: 4.2 MMOL/L — SIGNIFICANT CHANGE UP (ref 3.5–5.3)
POTASSIUM SERPL-SCNC: 3.8 MMOL/L — SIGNIFICANT CHANGE UP (ref 3.5–5.3)
POTASSIUM SERPL-SCNC: 4 MMOL/L — SIGNIFICANT CHANGE UP (ref 3.5–5.3)
POTASSIUM SERPL-SCNC: 4.2 MMOL/L — SIGNIFICANT CHANGE UP (ref 3.5–5.3)
RBC # BLD: 3.14 M/UL — LOW (ref 3.8–5.2)
RBC # BLD: 3.26 M/UL — LOW (ref 3.8–5.2)
RBC # FLD: 12.9 % — SIGNIFICANT CHANGE UP (ref 10.3–14.5)
RBC # FLD: 13 % — SIGNIFICANT CHANGE UP (ref 10.3–14.5)
SODIUM SERPL-SCNC: 132 MMOL/L — LOW (ref 135–145)
SODIUM SERPL-SCNC: 133 MMOL/L — LOW (ref 135–145)
SODIUM SERPL-SCNC: 136 MMOL/L — SIGNIFICANT CHANGE UP (ref 135–145)
WBC # BLD: 5.07 K/UL — SIGNIFICANT CHANGE UP (ref 3.8–10.5)
WBC # BLD: 6.5 K/UL — SIGNIFICANT CHANGE UP (ref 3.8–10.5)
WBC # FLD AUTO: 5.07 K/UL — SIGNIFICANT CHANGE UP (ref 3.8–10.5)
WBC # FLD AUTO: 6.5 K/UL — SIGNIFICANT CHANGE UP (ref 3.8–10.5)

## 2024-05-05 PROCEDURE — 99232 SBSQ HOSP IP/OBS MODERATE 35: CPT

## 2024-05-05 PROCEDURE — 36620 INSERTION CATHETER ARTERY: CPT

## 2024-05-05 PROCEDURE — 99233 SBSQ HOSP IP/OBS HIGH 50: CPT

## 2024-05-05 RX ORDER — INSULIN LISPRO 100/ML
4 VIAL (ML) SUBCUTANEOUS ONCE
Refills: 0 | Status: COMPLETED | OUTPATIENT
Start: 2024-05-05 | End: 2024-05-05

## 2024-05-05 RX ORDER — INSULIN LISPRO 100/ML
8 VIAL (ML) SUBCUTANEOUS
Refills: 0 | Status: DISCONTINUED | OUTPATIENT
Start: 2024-05-05 | End: 2024-05-06

## 2024-05-05 RX ORDER — SIMETHICONE 80 MG/1
80 TABLET, CHEWABLE ORAL ONCE
Refills: 0 | Status: COMPLETED | OUTPATIENT
Start: 2024-05-05 | End: 2024-05-05

## 2024-05-05 RX ORDER — POLYETHYLENE GLYCOL 3350 17 G/17G
17 POWDER, FOR SOLUTION ORAL EVERY 12 HOURS
Refills: 0 | Status: DISCONTINUED | OUTPATIENT
Start: 2024-05-05 | End: 2024-05-17

## 2024-05-05 RX ORDER — SODIUM CHLORIDE 9 MG/ML
1000 INJECTION, SOLUTION INTRAVENOUS ONCE
Refills: 0 | Status: COMPLETED | OUTPATIENT
Start: 2024-05-05 | End: 2024-05-05

## 2024-05-05 RX ORDER — SODIUM CHLORIDE 9 MG/ML
1000 INJECTION INTRAMUSCULAR; INTRAVENOUS; SUBCUTANEOUS ONCE
Refills: 0 | Status: COMPLETED | OUTPATIENT
Start: 2024-05-05 | End: 2024-05-05

## 2024-05-05 RX ORDER — SODIUM,POTASSIUM PHOSPHATES 278-250MG
2 POWDER IN PACKET (EA) ORAL ONCE
Refills: 0 | Status: COMPLETED | OUTPATIENT
Start: 2024-05-05 | End: 2024-05-05

## 2024-05-05 RX ORDER — INSULIN LISPRO 100/ML
3 VIAL (ML) SUBCUTANEOUS ONCE
Refills: 0 | Status: COMPLETED | OUTPATIENT
Start: 2024-05-05 | End: 2024-05-05

## 2024-05-05 RX ORDER — ENOXAPARIN SODIUM 100 MG/ML
40 INJECTION SUBCUTANEOUS
Refills: 0 | Status: DISCONTINUED | OUTPATIENT
Start: 2024-05-06 | End: 2024-05-17

## 2024-05-05 RX ORDER — INSULIN GLARGINE 100 [IU]/ML
20 INJECTION, SOLUTION SUBCUTANEOUS AT BEDTIME
Refills: 0 | Status: DISCONTINUED | OUTPATIENT
Start: 2024-05-05 | End: 2024-05-06

## 2024-05-05 RX ADMIN — SODIUM CHLORIDE 1000 MILLILITER(S): 9 INJECTION, SOLUTION INTRAVENOUS at 20:35

## 2024-05-05 RX ADMIN — ATORVASTATIN CALCIUM 40 MILLIGRAM(S): 80 TABLET, FILM COATED ORAL at 21:53

## 2024-05-05 RX ADMIN — LEVETIRACETAM 500 MILLIGRAM(S): 250 TABLET, FILM COATED ORAL at 17:01

## 2024-05-05 RX ADMIN — SODIUM CHLORIDE 1000 MILLILITER(S): 9 INJECTION INTRAMUSCULAR; INTRAVENOUS; SUBCUTANEOUS at 02:25

## 2024-05-05 RX ADMIN — Medication 5 UNIT(S): at 12:02

## 2024-05-05 RX ADMIN — NIMODIPINE 60 MILLIGRAM(S): 60 SOLUTION ORAL at 09:31

## 2024-05-05 RX ADMIN — Medication 5 UNIT(S): at 16:31

## 2024-05-05 RX ADMIN — Medication 650 MILLIGRAM(S): at 01:30

## 2024-05-05 RX ADMIN — Medication 2: at 07:45

## 2024-05-05 RX ADMIN — Medication 8: at 16:30

## 2024-05-05 RX ADMIN — CHLORHEXIDINE GLUCONATE 1 APPLICATION(S): 213 SOLUTION TOPICAL at 21:57

## 2024-05-05 RX ADMIN — LEVETIRACETAM 500 MILLIGRAM(S): 250 TABLET, FILM COATED ORAL at 06:43

## 2024-05-05 RX ADMIN — Medication 4 UNIT(S): at 01:31

## 2024-05-05 RX ADMIN — POLYETHYLENE GLYCOL 3350 17 GRAM(S): 17 POWDER, FOR SOLUTION ORAL at 17:01

## 2024-05-05 RX ADMIN — NIMODIPINE 60 MILLIGRAM(S): 60 SOLUTION ORAL at 02:15

## 2024-05-05 RX ADMIN — NIMODIPINE 60 MILLIGRAM(S): 60 SOLUTION ORAL at 21:53

## 2024-05-05 RX ADMIN — Medication 5 UNIT(S): at 07:46

## 2024-05-05 RX ADMIN — SIMETHICONE 80 MILLIGRAM(S): 80 TABLET, CHEWABLE ORAL at 23:12

## 2024-05-05 RX ADMIN — NIMODIPINE 60 MILLIGRAM(S): 60 SOLUTION ORAL at 06:43

## 2024-05-05 RX ADMIN — INSULIN GLARGINE 20 UNIT(S): 100 INJECTION, SOLUTION SUBCUTANEOUS at 22:23

## 2024-05-05 RX ADMIN — Medication 3 UNIT(S): at 17:35

## 2024-05-05 RX ADMIN — SENNA PLUS 2 TABLET(S): 8.6 TABLET ORAL at 21:53

## 2024-05-05 RX ADMIN — NIMODIPINE 60 MILLIGRAM(S): 60 SOLUTION ORAL at 17:01

## 2024-05-05 RX ADMIN — NIMODIPINE 60 MILLIGRAM(S): 60 SOLUTION ORAL at 13:46

## 2024-05-05 RX ADMIN — Medication 4: at 22:24

## 2024-05-05 RX ADMIN — Medication 650 MILLIGRAM(S): at 02:00

## 2024-05-05 RX ADMIN — Medication 2 PACKET(S): at 06:43

## 2024-05-05 RX ADMIN — Medication 6: at 12:01

## 2024-05-05 NOTE — PROGRESS NOTE ADULT - SUBJECTIVE AND OBJECTIVE BOX
HPI: 54 y/o female with PMHx significant for diabetes mellitus, not on any anti-thrombotics, presents with severe sudden onset bifrontal headache since thie AM with associated neck stiffness and blurry vision. CTH initially read as negative. LP done to rule out SAH which demonstrated 10869 RBCs and xanthochromia.     Admission Scores  GCS: 15  HH: 2   MF: 1  NIHSS: 0      24 hour Events: Patient admitted to NSCU for SAH protocol.     05/04: Angio: negative for aneurysm  5/4- No acute overnight events. Patient s/p angio- negative for vascular abnormalities. Examination stable.   5/5- No acute overnight events. Patient's examination remains well. MRI brain done.    Allergies    No Known Allergies    Intolerances        REVIEW OF SYSTEMS: [ ] Unable to Assess due to neurologic exam   [x] All ROS addressed below are non-contributory, except:  Neuro: [ ] Headache [ ] Back pain [ ] Numbness [ ] Weakness [ ] Ataxia [ ] Dizziness [ ] Aphasia [ ] Dysarthria [ ] Visual disturbance  Resp: [ ] Shortness of breath/dyspnea, [ ] Orthopnea [ ] Cough  CV: [ ] Chest pain [ ] Palpitation [ ] Lightheadedness [ ] Syncope  Renal: [ ] Thirst [ ] Edema  GI: [ ] Nausea [ ] Emesis [ ] Abdominal pain [ ] Constipation [ ] Diarrhea  Hem: [ ] Hematemesis [ ] bright red blood per rectum  ID: [ ] Fever [ ] Chills [ ] Dysuria  ENT: [ ] Rhinorrhea      DEVICES:   [ ] Restraints [ ] ET tube [ ] central line [ ] arterial line [ ] torres [ ] NGT/OGT [ ] EVD [ ] LD [ ] ROXANNA/HMV [ ] Trach [ ] PEG [ ] Chest Tube     VITALS:   Vital Signs Last 24 Hrs  T(C): 37.4 (04 May 2024 23:00), Max: 37.4 (04 May 2024 19:00)  T(F): 99.3 (04 May 2024 23:00), Max: 99.3 (04 May 2024 19:00)  HR: 83 (04 May 2024 23:00) (65 - 99)  BP: 146/63 (04 May 2024 08:00) (146/63 - 146/63)  BP(mean): 100 (04 May 2024 08:00) (100 - 100)  RR: 18 (04 May 2024 23:00) (12 - 20)  SpO2: 100% (04 May 2024 23:00) (95% - 100%)    Parameters below as of 04 May 2024 19:00  Patient On (Oxygen Delivery Method): room air      CAPILLARY BLOOD GLUCOSE      POCT Blood Glucose.: 205 mg/dL (05 May 2024 01:13)  POCT Blood Glucose.: 264 mg/dL (04 May 2024 22:26)  POCT Blood Glucose.: 273 mg/dL (04 May 2024 22:26)  POCT Blood Glucose.: 165 mg/dL (04 May 2024 16:30)  POCT Blood Glucose.: 149 mg/dL (04 May 2024 14:26)  POCT Blood Glucose.: 101 mg/dL (04 May 2024 13:58)  POCT Blood Glucose.: 181 mg/dL (04 May 2024 12:57)  POCT Blood Glucose.: 211 mg/dL (04 May 2024 12:03)  POCT Blood Glucose.: 229 mg/dL (04 May 2024 11:16)  POCT Blood Glucose.: 248 mg/dL (04 May 2024 10:09)  POCT Blood Glucose.: 313 mg/dL (04 May 2024 07:00)  POCT Blood Glucose.: 316 mg/dL (04 May 2024 06:04)  POCT Blood Glucose.: 287 mg/dL (04 May 2024 04:18)    I&O's Summary    03 May 2024 07:01  -  04 May 2024 07:00  --------------------------------------------------------  IN: 1337 mL / OUT: 450 mL / NET: 887 mL    04 May 2024 07:01  -  05 May 2024 01:53  --------------------------------------------------------  IN: 2156.5 mL / OUT: 1305 mL / NET: 851.5 mL        Respiratory:    ABG - ( 04 May 2024 23:46 )  pH, Arterial: 7.41  pH, Blood: x     /  pCO2: 32    /  pO2: 142   / HCO3: 20    / Base Excess: -3.7  /  SaO2: 99.1                LABS:                        9.3    6.50  )-----------( 314      ( 04 May 2024 23:52 )             27.9     05-04    132<L>  |  103  |  14  ----------------------------<  244<H>  4.0   |  18<L>  |  1.09             MEDICATION LEVELS:     IVF FLUIDS/MEDICATIONS:   MEDICATIONS  (STANDING):  atorvastatin 40 milliGRAM(s) Oral at bedtime  chlorhexidine 4% Liquid 1 Application(s) Topical daily  hydrALAZINE Injectable 5 milliGRAM(s) IV Push once  insulin glargine Injectable (LANTUS) 15 Unit(s) SubCutaneous at bedtime  insulin lispro (ADMELOG) corrective regimen sliding scale   SubCutaneous Before meals and at bedtime  insulin lispro Injectable (ADMELOG) 5 Unit(s) SubCutaneous before lunch  insulin lispro Injectable (ADMELOG) 5 Unit(s) SubCutaneous before dinner  insulin lispro Injectable (ADMELOG) 5 Unit(s) SubCutaneous before breakfast  levETIRAcetam 500 milliGRAM(s) Oral two times a day  niMODipine 60 milliGRAM(s) Oral every 4 hours  polyethylene glycol 3350 17 Gram(s) Oral daily  potassium phosphate / sodium phosphate Powder (PHOS-NaK) 2 Packet(s) Oral once  senna 2 Tablet(s) Oral at bedtime    MEDICATIONS  (PRN):  acetaminophen     Tablet .. 650 milliGRAM(s) Oral every 6 hours PRN Temp greater or equal to 38.5C (101.3F), Mild Pain (1 - 3)  oxyCODONE    IR 5 milliGRAM(s) Oral every 4 hours PRN Moderate Pain (4 - 6)        IMAGING:      EXAMINATION:  PHYSICAL EXAM:    Constitutional: No Acute Distress     Neurological: Awake, alert oriented to person, place and time, Following Commands, PERRL, EOMI, No Gaze Preference, Face Symmetrical, Speech Fluent, No dysmetria.    Motor exam:          Upper extremity                         Delt     Bicep     Tricep    HG                                                 R         5/5        5/5        5/5       5/5                                               L          5/5        5/5        5/5       5/5          Lower extremity                        HF         KF        KE       DF         PF                                                  R        5/5 5/5 5/5 5/5 5/5                                               L         5/5 5/5 5/5 5/5 5/5                                                 Sensation: [x] intact to light touch  [ ] decreased:     Pulmonary: Clear to Auscultation, No rales, No rhonchi, No wheezes     Cardiovascular: S1, S2, Regular rate and rhythm     Gastrointestinal: Soft, Non-tender, Non-distended     Extremities: No calf tenderness     Incision:    HPI: 54 y/o female with PMHx significant for diabetes mellitus, not on any anti-thrombotics, presents with severe sudden onset bifrontal headache since thie AM with associated neck stiffness and blurry vision. CTH initially read as negative. LP done to rule out SAH which demonstrated 96481 RBCs and xanthochromia.     Admission Scores  GCS: 15  HH: 2   MF: 1  NIHSS: 0      24 hour Events: Patient admitted to NSCU for SAH protocol.     05/04: Angio: negative for aneurysm  5/4- No acute overnight events. Patient s/p angio- negative for vascular abnormalities. Examination stable.   5/5- No acute overnight events. Patient's examination remains well. MRI brain done.      REVIEW OF SYSTEMS: No CP/SOB, no n/v/d, no numbness/tingling, no weakness, chronic blurry vision      VITALS:   Vital Signs Last 24 Hrs  T(C): 37.4 (04 May 2024 23:00), Max: 37.4 (04 May 2024 19:00)  T(F): 99.3 (04 May 2024 23:00), Max: 99.3 (04 May 2024 19:00)  HR: 83 (04 May 2024 23:00) (65 - 99)  BP: 146/63 (04 May 2024 08:00) (146/63 - 146/63)  BP(mean): 100 (04 May 2024 08:00) (100 - 100)  RR: 18 (04 May 2024 23:00) (12 - 20)  SpO2: 100% (04 May 2024 23:00) (95% - 100%)    Parameters below as of 04 May 2024 19:00  Patient On (Oxygen Delivery Method): room air      CAPILLARY BLOOD GLUCOSE      POCT Blood Glucose.: 205 mg/dL (05 May 2024 01:13)  POCT Blood Glucose.: 264 mg/dL (04 May 2024 22:26)  POCT Blood Glucose.: 273 mg/dL (04 May 2024 22:26)  POCT Blood Glucose.: 165 mg/dL (04 May 2024 16:30)  POCT Blood Glucose.: 149 mg/dL (04 May 2024 14:26)  POCT Blood Glucose.: 101 mg/dL (04 May 2024 13:58)  POCT Blood Glucose.: 181 mg/dL (04 May 2024 12:57)  POCT Blood Glucose.: 211 mg/dL (04 May 2024 12:03)  POCT Blood Glucose.: 229 mg/dL (04 May 2024 11:16)  POCT Blood Glucose.: 248 mg/dL (04 May 2024 10:09)  POCT Blood Glucose.: 313 mg/dL (04 May 2024 07:00)  POCT Blood Glucose.: 316 mg/dL (04 May 2024 06:04)  POCT Blood Glucose.: 287 mg/dL (04 May 2024 04:18)    I&O's Summary    03 May 2024 07:01  -  04 May 2024 07:00  --------------------------------------------------------  IN: 1337 mL / OUT: 450 mL / NET: 887 mL    04 May 2024 07:01  -  05 May 2024 01:53  --------------------------------------------------------  IN: 2156.5 mL / OUT: 1305 mL / NET: 851.5 mL        Respiratory:    ABG - ( 04 May 2024 23:46 )  pH, Arterial: 7.41  pH, Blood: x     /  pCO2: 32    /  pO2: 142   / HCO3: 20    / Base Excess: -3.7  /  SaO2: 99.1        LABS:                        9.3    6.50  )-----------( 314      ( 04 May 2024 23:52 )             27.9     05-04    132<L>  |  103  |  14  ----------------------------<  244<H>  4.0   |  18<L>  |  1.09        MEDICATION LEVELS:     IVF FLUIDS/MEDICATIONS:   MEDICATIONS  (STANDING):  atorvastatin 40 milliGRAM(s) Oral at bedtime  chlorhexidine 4% Liquid 1 Application(s) Topical daily  hydrALAZINE Injectable 5 milliGRAM(s) IV Push once  insulin glargine Injectable (LANTUS) 15 Unit(s) SubCutaneous at bedtime  insulin lispro (ADMELOG) corrective regimen sliding scale   SubCutaneous Before meals and at bedtime  insulin lispro Injectable (ADMELOG) 5 Unit(s) SubCutaneous before lunch  insulin lispro Injectable (ADMELOG) 5 Unit(s) SubCutaneous before dinner  insulin lispro Injectable (ADMELOG) 5 Unit(s) SubCutaneous before breakfast  levETIRAcetam 500 milliGRAM(s) Oral two times a day  niMODipine 60 milliGRAM(s) Oral every 4 hours  polyethylene glycol 3350 17 Gram(s) Oral daily  potassium phosphate / sodium phosphate Powder (PHOS-NaK) 2 Packet(s) Oral once  senna 2 Tablet(s) Oral at bedtime    MEDICATIONS  (PRN):  acetaminophen     Tablet .. 650 milliGRAM(s) Oral every 6 hours PRN Temp greater or equal to 38.5C (101.3F), Mild Pain (1 - 3)  oxyCODONE    IR 5 milliGRAM(s) Oral every 4 hours PRN Moderate Pain (4 - 6)        IMAGING:      EXAMINATION:  PHYSICAL EXAM:    Constitutional: sitting up in bed, comfortable    Neurological: Awake, alert oriented to person, place and time, Following Commands, PERRL, EOMI, No Gaze Preference, Face Symmetrical, Speech Fluent, No dysmetria.  Motor exam: 5/5 throughout, maintains all extremities antigravity                                               Sensation: [x] intact to light touch      Pulmonary: Clear to Auscultation, No rales, No rhonchi, No wheezes     Cardiovascular: S1, S2, Regular rate and rhythm     Gastrointestinal: Soft, Non-tender, Non-distended, LBM PTA    Extremities: No calf tenderness

## 2024-05-05 NOTE — PROGRESS NOTE ADULT - ASSESSMENT
ASSESSMENT/PLAN: HH2 mF 1 subarachnoid hemorrhage, post-bleed day 2    NEURO:  Neurochecks Q1H  Repeat CTH : stable  MRI brain performed, will follow official read   Angio 05/04: negative  Seizure Prophylaxis: Levetiracetam until MRI negative  Delayed Cerebral Ischemia Management: Serial screening TCDs, euvolemia, nimodipine  Hydrocephalus: No EVD  Pain: PRN analgesics  Activity: [x] OOB as tolerated [] Bedrest [] PT [] OT [] PMNR    PULM:  Incentive spirometry  Maintain SpO2 >94%    CV:  SBP goal  mmHg ( will liberalize BP once MRI is back )  TTE- EF 63%, normal atria, normal LV function   LDL >200-- will start Atorvastatin 40 mg QD  L Radial A line     RENAL:  Fluids: CCD  Maintain normonatremia, normovolemia  Monitor strict I/Os    GI:  Diet: CCD diet  GI prophylaxis [x] not indicated [] PPI [] other:  Bowel regimen [x] miralax [x] senna [] other:    ENDO:   Goal euglycemia (-180)  Will obtain HbA1c  GAP : 20, Positive for ketones  Patient s/p insulin gtt, now on Lantus 15 units at bedtime, Lispro 5 units before meals    HEME/ONC:  Monitor H/H  VTE prophylaxis: [x] SCDs [x] chemoprophylaxis- restart if MRI stable [] hold chemoprophylaxis due to: PBD0 [] high risk of DVT/PE on admission due to:    ID:  Monitor for fevers    MISC:    SOCIAL/FAMILY:  [x] awaiting [] updated at bedside [] family meeting    CODE STATUS:  [x] Full Code [] DNR [] DNI [] Palliative/Comfort Care    DISPOSITION:  [x] ICU [] Stroke Unit [] Floor [] EMU [] RCU [] PCU    [x] Patient is at high risk of neurologic deterioration/death due to: SAH, vasospasms, aneurysmal rerupture   ASSESSMENT/PLAN: HH2 mF 1 subarachnoid hemorrhage, post-bleed day 2    NEURO: SAH angio negative  Neurochecks Q2H  Repeat CTH : stable  d/c keppra  Delayed Cerebral Ischemia Management: Serial screening TCDs, euvolemia, nimodipine  Pain: PRN analgesics  Activity: [x] OOB as tolerated [] Bedrest [] PT [] OT [] PMNR    PULM:  Incentive spirometry  Maintain SpO2 >94%    CV: TTE- EF 63%, normal atria, normal LV function   SBP goal  mmHg   Atorvastatin 40 mg QD  L Radial A line     RENAL:  Maintain normonatremia, normovolemia  Monitor strict I/Os    GI:  Diet: CCD diet  GI prophylaxis [x] not indicated [] PPI [] other:  Bowel regimen [x] miralax [x] senna [] other:    ENDO:   Endocrine following  Goal euglycemia (-180), f/u A1c  Lantus 15 units at bedtime, Lispro 5 units before meals    HEME/ONC:  Monitor H/H  VTE prophylaxis: SCDs  D/w NSGY re: SQL    ID:  Monitor for fevers    Dispo: stays in NSICU

## 2024-05-05 NOTE — PROCEDURE NOTE - NSPROCDETAILS_GEN_ALL_CORE
location identified, draped/prepped, sterile technique used, needle inserted/introduced/positive blood return obtained via catheter/connected to a pressurized flush line/sutured in place/hemostasis with direct pressure, dressing applied/all materials/supplies accounted for at end of procedure
location identified, draped/prepped, sterile technique used, needle inserted/introduced/positive blood return obtained via catheter/connected to a pressurized flush line/sutured in place/hemostasis with direct pressure, dressing applied/Seldinger technique/all materials/supplies accounted for at end of procedure

## 2024-05-05 NOTE — PROGRESS NOTE ADULT - ASSESSMENT
A/P: 53F, no AC/AP, pmh diabetes, p/w severe sudden-onset bifrontal HA this AM with associated neck stiffness/blurry vision now admitted with SAH. Endocrinology was consulted for management of diabetes mellitus.    #Type 2 Diabetes Mellitus  #DKA   - HbA1c - ordered and pending. None done recently    ; home regimen: Novolin 14-20 units BID   -egfr: 91  - Patient presented with BG in the 300s with DKA- AG 20, bicarb 18, BHB 7.3  - Fasting sugar stable this AM however post breakfast sugar trending up  Plan:  - Continue with Lantus 15 units nightly   - Increase Admelog to 7 units with meals   - Given patient's body habitus and presentation in DKA, would like to rule out RIYA- please order Glutamic acid decarboxylase 65 antibodies, islet cell antibodies and ZnT8 antibodies- ordered and pending  - Please check FSG before meals and QHS, or q6h while NPO  - Inpatient glucose goal 140-180 in the ICU setting   - Please keep patient on a diabetic, carb controlled diet once eating   - hypoglycemia orderset prn    - Discharge planning: Likely basal bolus- doses TBD    #Hypertension  - SBP goal <140 in the setting of SAH  - Management as per primary team    #Hyperlipidemia  - check fasting lipid panel    Patient is high risk and high level decision making, requiring ICU level of care.    Elena Rangel,   Attending Physician   Department of Endocrinology, Diabetes and Metabolism     If before 9AM or after 5PM, or on weekends/holidays, please call the Endocrine answering service for assistance ([phone]).  For nonurgent matters, please email [email or web address] for assistance.     Please note that a different provider may be following this patient each day.

## 2024-05-05 NOTE — PROGRESS NOTE ADULT - SUBJECTIVE AND OBJECTIVE BOX
Patient seen and examined at bedside.    --Anticoagulation--    T(C): 37.1 (05-05-24 @ 03:00), Max: 37.4 (05-04-24 @ 19:00)  HR: 79 (05-05-24 @ 05:00) (65 - 85)  BP: 127/69 (05-05-24 @ 04:00) (118/65 - 146/63)  RR: 16 (05-05-24 @ 05:00) (12 - 20)  SpO2: 96% (05-05-24 @ 05:00) (94% - 100%)  Wt(kg): --    Exam:  AOx3, FC, PERRL, EOMI, no facial, 5/5 throughout, no drift

## 2024-05-05 NOTE — PHYSICAL THERAPY INITIAL EVALUATION ADULT - PERTINENT HX OF CURRENT PROBLEM, REHAB EVAL
53F, no AC/AP, pmh diabetes, p/w severe sudden-onset bifrontal HA this AM with associated neck stiffness/blurry vision. CTH/CTA read as negative by radiologist but appears to have R perimes SAH (HH1, MF1). LP in ED w/ 34859 RBCs, and xanthochromic. Afebrile, WBC wnl. PLTs wnl, Coags wnl.  5/4 CT Head: Subarachnoid hemorrhage ventral to the brainstem is suboptimally evaluated due to beam hardening artifact and adjacent bone. The findings are stable from 05/04/2024 at 6:45 PM. Slight prominence of the  temporal horns of both lateral ventricles is stable. MRI Head: Small scattered amounts of subarachnoid hemorrhage along with layering intraventricular hemorrhage which appear unchanged. No abnormal intracranial enhancement.

## 2024-05-05 NOTE — PROGRESS NOTE ADULT - ATTENDING COMMENTS
I have modified the above note to represent my own review of data, assessment, and plan for this patient.    Medically complex 2/2 SAH, DM, HLD  Non-critical care time: 30 minutes

## 2024-05-05 NOTE — PROGRESS NOTE ADULT - SUBJECTIVE AND OBJECTIVE BOX
Interval history:  Patient was transitioned off insulin gtt yesterday   BG stable although now trending up     MEDICATIONS  (STANDING):  atorvastatin 40 milliGRAM(s) Oral at bedtime  bisacodyl 5 milliGRAM(s) Oral at bedtime  chlorhexidine 4% Liquid 1 Application(s) Topical daily  hydrALAZINE Injectable 5 milliGRAM(s) IV Push once  insulin glargine Injectable (LANTUS) 15 Unit(s) SubCutaneous at bedtime  insulin lispro (ADMELOG) corrective regimen sliding scale   SubCutaneous Before meals and at bedtime  insulin lispro Injectable (ADMELOG) 5 Unit(s) SubCutaneous before breakfast  insulin lispro Injectable (ADMELOG) 5 Unit(s) SubCutaneous before dinner  insulin lispro Injectable (ADMELOG) 5 Unit(s) SubCutaneous before lunch  levETIRAcetam 500 milliGRAM(s) Oral two times a day  niMODipine 60 milliGRAM(s) Oral every 4 hours  polyethylene glycol 3350 17 Gram(s) Oral every 12 hours  senna 2 Tablet(s) Oral at bedtime    MEDICATIONS  (PRN):  acetaminophen     Tablet .. 650 milliGRAM(s) Oral every 6 hours PRN Temp greater or equal to 38.5C (101.3F), Mild Pain (1 - 3)  oxyCODONE    IR 5 milliGRAM(s) Oral every 4 hours PRN Moderate Pain (4 - 6)      Allergies    No Known Allergies    Intolerances      Review of Systems:  Constitutional: No fever  Eyes: No blurry vision  Neuro: No tremors  HEENT: No pain  Cardiovascular: No chest pain, palpitations  Respiratory: No SOB, no cough  GI: No nausea, vomiting, abdominal pain  : No dysuria  Skin: no rash  Psych: no depression  Endocrine: no polyuria, polydipsia  Hem/lymph: no swelling  Osteoporosis: no fractures    ALL OTHER SYSTEMS REVIEWED AND NEGATIVE    UNABLE TO OBTAIN    PHYSICAL EXAM:  VITALS: T(C): 36.8 (05-05-24 @ 11:00)  T(F): 98.2 (05-05-24 @ 11:00), Max: 99.3 (05-04-24 @ 19:00)  HR: 92 (05-05-24 @ 13:00) (68 - 92)  BP: 108/59 (05-05-24 @ 06:00) (108/59 - 127/69)  RR:  (12 - 20)  SpO2:  (94% - 100%)  Wt(kg): --  GENERAL: NAD, well-groomed, well-developed  EYES: No proptosis, no lid lag, anicteric  HEENT:  Atraumatic, Normocephalic, moist mucous membranes  THYROID: Normal size, no palpable nodules  RESPIRATORY: Clear to auscultation bilaterally; No rales, rhonchi, wheezing, or rubs  CARDIOVASCULAR: Regular rate and rhythm; No murmurs; no peripheral edema  GI: Soft, nontender, non distended, normal bowel sounds  SKIN: Dry, intact, No rashes or lesions  MUSCULOSKELETAL: Full range of motion, normal strength  NEURO: sensation intact, extraocular movements intact, no tremor, normal reflexes  PSYCH: Alert and oriented x 3, normal affect, normal mood  CUSHING'S SIGNS: no striae    POCT Blood Glucose.: 276 mg/dL (05-05-24 @ 11:58)  POCT Blood Glucose.: 154 mg/dL (05-05-24 @ 07:41)  POCT Blood Glucose.: 205 mg/dL (05-05-24 @ 01:13)  POCT Blood Glucose.: 264 mg/dL (05-04-24 @ 22:26)  POCT Blood Glucose.: 273 mg/dL (05-04-24 @ 22:26)  POCT Blood Glucose.: 165 mg/dL (05-04-24 @ 16:30)  POCT Blood Glucose.: 149 mg/dL (05-04-24 @ 14:26)  POCT Blood Glucose.: 101 mg/dL (05-04-24 @ 13:58)  POCT Blood Glucose.: 181 mg/dL (05-04-24 @ 12:57)  POCT Blood Glucose.: 211 mg/dL (05-04-24 @ 12:03)  POCT Blood Glucose.: 229 mg/dL (05-04-24 @ 11:16)  POCT Blood Glucose.: 248 mg/dL (05-04-24 @ 10:09)  POCT Blood Glucose.: 313 mg/dL (05-04-24 @ 07:00)  POCT Blood Glucose.: 316 mg/dL (05-04-24 @ 06:04)  POCT Blood Glucose.: 287 mg/dL (05-04-24 @ 04:18)  POCT Blood Glucose.: 277 mg/dL (05-03-24 @ 15:31)      05-05    136  |  107  |  11  ----------------------------<  134<H>  3.8   |  17<L>  |  1.02    eGFR: 66    Ca    8.3<L>      05-05  Mg     2.4     05-04  Phos  2.3     05-04    TPro  7.1  /  Alb  3.6  /  TBili  0.2  /  DBili  x   /  AST  20  /  ALT  22  /  AlkPhos  157<H>  05-03          Thyroid Function Tests:  05-04 @ 02:59 TSH 2.44 FreeT4 1.1 T3 60 Anti TPO -- Anti Thyroglobulin Ab -- TSI --

## 2024-05-05 NOTE — PROGRESS NOTE ADULT - SUBJECTIVE AND OBJECTIVE BOX
Interval events:  No acute events on evening rounds.   Is and Os net +500cc.     VITALS:  T(C): , Max: 37.4 (05-04-24 @ 23:00)  HR:  (68 - 92)  BP:  (108/59 - 127/69)  ABP:  (80/63 - 158/80)  RR:  (12 - 20)  SpO2:  (94% - 100%)  Wt(kg): --      05-04-24 @ 07:01  -  05-05-24 @ 07:00  --------------------------------------------------------  IN: 3471.5 mL / OUT: 1305 mL / NET: 2166.5 mL    05-05-24 @ 07:01  -  05-05-24 @ 20:55  --------------------------------------------------------  IN: 500 mL / OUT: 0 mL / NET: 500 mL      LABS:  Na: 133 (05-05 @ 19:41), 136 (05-05 @ 05:01), 132 (05-04 @ 23:52), 137 (05-04 @ 16:39), 135 (05-04 @ 11:47), 137 (05-04 @ 03:01), 138 (05-03 @ 16:10)  K: 4.2 (05-05 @ 19:41), 3.8 (05-05 @ 05:01), 4.0 (05-04 @ 23:52), 4.4 (05-04 @ 16:39), 3.3 (05-04 @ 11:47), 3.7 (05-04 @ 03:01), 3.9 (05-03 @ 16:10)  Cl: 103 (05-05 @ 19:41), 107 (05-05 @ 05:01), 103 (05-04 @ 23:52), 104 (05-04 @ 16:39), 101 (05-04 @ 11:47), 99 (05-04 @ 03:01), 96 (05-03 @ 16:10)  CO2: 20 (05-05 @ 19:41), 17 (05-05 @ 05:01), 18 (05-04 @ 23:52), 18 (05-04 @ 16:39), 21 (05-04 @ 11:47), 18 (05-04 @ 03:01), 28 (05-03 @ 16:10)  BUN: 20 (05-05 @ 19:41), 11 (05-05 @ 05:01), 14 (05-04 @ 23:52), 11 (05-04 @ 16:39), 12 (05-04 @ 11:47), 11 (05-04 @ 03:01), 15 (05-03 @ 16:10)  Cr: 1.62 (05-05 @ 19:41), 1.02 (05-05 @ 05:01), 1.09 (05-04 @ 23:52), 0.92 (05-04 @ 16:39), 0.85 (05-04 @ 11:47), 0.78 (05-04 @ 03:01), 0.78 (05-03 @ 16:10)  Glu: 318(05-05 @ 19:41), 134(05-05 @ 05:01), 244(05-04 @ 23:52), 191(05-04 @ 16:39), 228(05-04 @ 11:47), 310(05-04 @ 03:01), 286(05-03 @ 16:10)    Hgb: 9.0 (05-05 @ 19:41), 9.3 (05-04 @ 23:52), 11.0 (05-04 @ 02:59), 12.3 (05-03 @ 16:10)  Hct: 26.7 (05-05 @ 19:41), 27.9 (05-04 @ 23:52), 31.3 (05-04 @ 02:59), 35.3 (05-03 @ 16:10)  WBC: 5.07 (05-05 @ 19:41), 6.50 (05-04 @ 23:52), 6.65 (05-04 @ 02:59), 6.00 (05-03 @ 16:10)  Plt: 284 (05-05 @ 19:41), 314 (05-04 @ 23:52), 349 (05-04 @ 02:59), 374 (05-03 @ 16:10)    INR: 0.84 05-04-24 @ 00:34  PTT: 23.8 05-04-24 @ 00:34    MEDICATIONS:  acetaminophen     Tablet .. 650 milliGRAM(s) Oral every 6 hours PRN  atorvastatin 40 milliGRAM(s) Oral at bedtime  bisacodyl 5 milliGRAM(s) Oral at bedtime  chlorhexidine 4% Liquid 1 Application(s) Topical daily  hydrALAZINE Injectable 5 milliGRAM(s) IV Push once  insulin glargine Injectable (LANTUS) 20 Unit(s) SubCutaneous at bedtime  insulin lispro (ADMELOG) corrective regimen sliding scale   SubCutaneous Before meals and at bedtime  insulin lispro Injectable (ADMELOG) 8 Unit(s) SubCutaneous before breakfast  insulin lispro Injectable (ADMELOG) 8 Unit(s) SubCutaneous before lunch  insulin lispro Injectable (ADMELOG) 8 Unit(s) SubCutaneous before dinner  lactated ringers Bolus 1000 milliLiter(s) IV Bolus once  levETIRAcetam 500 milliGRAM(s) Oral two times a day  niMODipine 60 milliGRAM(s) Oral every 4 hours  oxyCODONE    IR 5 milliGRAM(s) Oral every 4 hours PRN  polyethylene glycol 3350 17 Gram(s) Oral every 12 hours  senna 2 Tablet(s) Oral at bedtime    EXAMINATION:  please see exam from daytime     Assessment/Plan:   52 yo woman with DM who developed a severe headache on 5/3, found to have a HH2 mF1 SAH, angio neg for aneurysm. MRI brain and C spine neg.     PBD 2.     nimodipine   Keppra 500 mg BID for a potential unsecured aneurysm   SBP   CCD diet  Lantus increased to 20U with 8U premeal insulin   start Lov ppx when safe from nrsg standpoint     Jacquelyn Rodriguez  Neurocritical Care Attending

## 2024-05-05 NOTE — PHYSICAL THERAPY INITIAL EVALUATION ADULT - ADDITIONAL COMMENTS
Pt lives her  in a private home, no steps required. Pt was independent with all functional mobility and ADLs prior to admission without AD.

## 2024-05-06 LAB
ANION GAP SERPL CALC-SCNC: 11 MMOL/L — SIGNIFICANT CHANGE UP (ref 5–17)
BUN SERPL-MCNC: 16 MG/DL — SIGNIFICANT CHANGE UP (ref 7–23)
CALCIUM SERPL-MCNC: 8.9 MG/DL — SIGNIFICANT CHANGE UP (ref 8.4–10.5)
CHLORIDE SERPL-SCNC: 108 MMOL/L — SIGNIFICANT CHANGE UP (ref 96–108)
CO2 SERPL-SCNC: 19 MMOL/L — LOW (ref 22–31)
CREAT SERPL-MCNC: 1.19 MG/DL — SIGNIFICANT CHANGE UP (ref 0.5–1.3)
EGFR: 55 ML/MIN/1.73M2 — LOW
GLUCOSE BLDC GLUCOMTR-MCNC: 190 MG/DL — HIGH (ref 70–99)
GLUCOSE BLDC GLUCOMTR-MCNC: 200 MG/DL — HIGH (ref 70–99)
GLUCOSE BLDC GLUCOMTR-MCNC: 218 MG/DL — HIGH (ref 70–99)
GLUCOSE BLDC GLUCOMTR-MCNC: 252 MG/DL — HIGH (ref 70–99)
GLUCOSE SERPL-MCNC: 210 MG/DL — HIGH (ref 70–99)
HCT VFR BLD CALC: 27.8 % — LOW (ref 34.5–45)
HGB BLD-MCNC: 9.3 G/DL — LOW (ref 11.5–15.5)
MAGNESIUM SERPL-MCNC: 2.1 MG/DL — SIGNIFICANT CHANGE UP (ref 1.6–2.6)
MCHC RBC-ENTMCNC: 28.9 PG — SIGNIFICANT CHANGE UP (ref 27–34)
MCHC RBC-ENTMCNC: 33.5 GM/DL — SIGNIFICANT CHANGE UP (ref 32–36)
MCV RBC AUTO: 86.3 FL — SIGNIFICANT CHANGE UP (ref 80–100)
NRBC # BLD: 0 /100 WBCS — SIGNIFICANT CHANGE UP (ref 0–0)
PHOSPHATE SERPL-MCNC: 3.8 MG/DL — SIGNIFICANT CHANGE UP (ref 2.5–4.5)
PLATELET # BLD AUTO: 293 K/UL — SIGNIFICANT CHANGE UP (ref 150–400)
POTASSIUM SERPL-MCNC: 4 MMOL/L — SIGNIFICANT CHANGE UP (ref 3.5–5.3)
POTASSIUM SERPL-SCNC: 4 MMOL/L — SIGNIFICANT CHANGE UP (ref 3.5–5.3)
RBC # BLD: 3.22 M/UL — LOW (ref 3.8–5.2)
RBC # FLD: 13 % — SIGNIFICANT CHANGE UP (ref 10.3–14.5)
SODIUM SERPL-SCNC: 138 MMOL/L — SIGNIFICANT CHANGE UP (ref 135–145)
WBC # BLD: 4.82 K/UL — SIGNIFICANT CHANGE UP (ref 3.8–10.5)
WBC # FLD AUTO: 4.82 K/UL — SIGNIFICANT CHANGE UP (ref 3.8–10.5)

## 2024-05-06 PROCEDURE — 99232 SBSQ HOSP IP/OBS MODERATE 35: CPT

## 2024-05-06 PROCEDURE — 93886 INTRACRANIAL COMPLETE STUDY: CPT | Mod: 26

## 2024-05-06 PROCEDURE — 99233 SBSQ HOSP IP/OBS HIGH 50: CPT

## 2024-05-06 RX ORDER — INSULIN LISPRO 100/ML
14 VIAL (ML) SUBCUTANEOUS
Refills: 0 | Status: DISCONTINUED | OUTPATIENT
Start: 2024-05-06 | End: 2024-05-07

## 2024-05-06 RX ORDER — INSULIN LISPRO 100/ML
VIAL (ML) SUBCUTANEOUS AT BEDTIME
Refills: 0 | Status: DISCONTINUED | OUTPATIENT
Start: 2024-05-06 | End: 2024-05-17

## 2024-05-06 RX ORDER — INSULIN LISPRO 100/ML
VIAL (ML) SUBCUTANEOUS
Refills: 0 | Status: DISCONTINUED | OUTPATIENT
Start: 2024-05-06 | End: 2024-05-11

## 2024-05-06 RX ORDER — SODIUM CHLORIDE 9 MG/ML
500 INJECTION INTRAMUSCULAR; INTRAVENOUS; SUBCUTANEOUS ONCE
Refills: 0 | Status: COMPLETED | OUTPATIENT
Start: 2024-05-06 | End: 2024-05-07

## 2024-05-06 RX ORDER — NIMODIPINE 60 MG/10ML
30 SOLUTION ORAL
Refills: 0 | Status: DISCONTINUED | OUTPATIENT
Start: 2024-05-06 | End: 2024-05-07

## 2024-05-06 RX ORDER — PHENAZOPYRIDINE HCL 100 MG
100 TABLET ORAL EVERY 8 HOURS
Refills: 0 | Status: COMPLETED | OUTPATIENT
Start: 2024-05-06 | End: 2024-05-08

## 2024-05-06 RX ORDER — INSULIN GLARGINE 100 [IU]/ML
26 INJECTION, SOLUTION SUBCUTANEOUS AT BEDTIME
Refills: 0 | Status: DISCONTINUED | OUTPATIENT
Start: 2024-05-06 | End: 2024-05-08

## 2024-05-06 RX ADMIN — INSULIN GLARGINE 26 UNIT(S): 100 INJECTION, SOLUTION SUBCUTANEOUS at 21:42

## 2024-05-06 RX ADMIN — CHLORHEXIDINE GLUCONATE 1 APPLICATION(S): 213 SOLUTION TOPICAL at 12:22

## 2024-05-06 RX ADMIN — SENNA PLUS 2 TABLET(S): 8.6 TABLET ORAL at 21:42

## 2024-05-06 RX ADMIN — LEVETIRACETAM 500 MILLIGRAM(S): 250 TABLET, FILM COATED ORAL at 06:30

## 2024-05-06 RX ADMIN — NIMODIPINE 30 MILLIGRAM(S): 60 SOLUTION ORAL at 22:27

## 2024-05-06 RX ADMIN — NIMODIPINE 60 MILLIGRAM(S): 60 SOLUTION ORAL at 01:54

## 2024-05-06 RX ADMIN — NIMODIPINE 60 MILLIGRAM(S): 60 SOLUTION ORAL at 12:01

## 2024-05-06 RX ADMIN — NIMODIPINE 30 MILLIGRAM(S): 60 SOLUTION ORAL at 20:38

## 2024-05-06 RX ADMIN — NIMODIPINE 60 MILLIGRAM(S): 60 SOLUTION ORAL at 06:29

## 2024-05-06 RX ADMIN — POLYETHYLENE GLYCOL 3350 17 GRAM(S): 17 POWDER, FOR SOLUTION ORAL at 06:30

## 2024-05-06 RX ADMIN — Medication 8 UNIT(S): at 12:17

## 2024-05-06 RX ADMIN — ATORVASTATIN CALCIUM 40 MILLIGRAM(S): 80 TABLET, FILM COATED ORAL at 21:41

## 2024-05-06 RX ADMIN — POLYETHYLENE GLYCOL 3350 17 GRAM(S): 17 POWDER, FOR SOLUTION ORAL at 17:29

## 2024-05-06 RX ADMIN — Medication 4: at 16:41

## 2024-05-06 RX ADMIN — Medication 14 UNIT(S): at 16:41

## 2024-05-06 RX ADMIN — NIMODIPINE 30 MILLIGRAM(S): 60 SOLUTION ORAL at 18:33

## 2024-05-06 RX ADMIN — NIMODIPINE 30 MILLIGRAM(S): 60 SOLUTION ORAL at 16:45

## 2024-05-06 RX ADMIN — Medication 2: at 12:16

## 2024-05-06 RX ADMIN — Medication 8 UNIT(S): at 08:02

## 2024-05-06 RX ADMIN — ENOXAPARIN SODIUM 40 MILLIGRAM(S): 100 INJECTION SUBCUTANEOUS at 17:28

## 2024-05-06 RX ADMIN — Medication 6: at 08:01

## 2024-05-06 NOTE — PROGRESS NOTE ADULT - SUBJECTIVE AND OBJECTIVE BOX
Interval events:  TCDs wnl today.   No acute events on evening rounds.   Is and Os net neg 300c.     VITALS:  T(C): , Max: 37.4 (05-06-24 @ 03:00)  HR:  (75 - 98)  BP: --  ABP:  (100/47 - 164/82)  RR:  (11 - 28)  SpO2:  (95% - 99%)  Wt(kg): --      05-05-24 @ 07:01  -  05-06-24 @ 07:00  --------------------------------------------------------  IN: 1920 mL / OUT: 750 mL / NET: 1170 mL    05-06-24 @ 07:01  -  05-06-24 @ 22:50  --------------------------------------------------------  IN: 910 mL / OUT: 1250 mL / NET: -340 mL      LABS:  Na: 138 (05-06 @ 03:47), 133 (05-05 @ 19:41), 136 (05-05 @ 05:01), 132 (05-04 @ 23:52), 137 (05-04 @ 16:39), 135 (05-04 @ 11:47), 137 (05-04 @ 03:01)  K: 4.0 (05-06 @ 03:47), 4.2 (05-05 @ 19:41), 3.8 (05-05 @ 05:01), 4.0 (05-04 @ 23:52), 4.4 (05-04 @ 16:39), 3.3 (05-04 @ 11:47), 3.7 (05-04 @ 03:01)  Cl: 108 (05-06 @ 03:47), 103 (05-05 @ 19:41), 107 (05-05 @ 05:01), 103 (05-04 @ 23:52), 104 (05-04 @ 16:39), 101 (05-04 @ 11:47), 99 (05-04 @ 03:01)  CO2: 19 (05-06 @ 03:47), 20 (05-05 @ 19:41), 17 (05-05 @ 05:01), 18 (05-04 @ 23:52), 18 (05-04 @ 16:39), 21 (05-04 @ 11:47), 18 (05-04 @ 03:01)  BUN: 16 (05-06 @ 03:47), 20 (05-05 @ 19:41), 11 (05-05 @ 05:01), 14 (05-04 @ 23:52), 11 (05-04 @ 16:39), 12 (05-04 @ 11:47), 11 (05-04 @ 03:01)  Cr: 1.19 (05-06 @ 03:47), 1.62 (05-05 @ 19:41), 1.02 (05-05 @ 05:01), 1.09 (05-04 @ 23:52), 0.92 (05-04 @ 16:39), 0.85 (05-04 @ 11:47), 0.78 (05-04 @ 03:01)  Glu: 210(05-06 @ 03:47), 318(05-05 @ 19:41), 134(05-05 @ 05:01), 244(05-04 @ 23:52), 191(05-04 @ 16:39), 228(05-04 @ 11:47), 310(05-04 @ 03:01)    Hgb: 9.3 (05-06 @ 03:47), 9.0 (05-05 @ 19:41), 9.3 (05-04 @ 23:52), 11.0 (05-04 @ 02:59)  Hct: 27.8 (05-06 @ 03:47), 26.7 (05-05 @ 19:41), 27.9 (05-04 @ 23:52), 31.3 (05-04 @ 02:59)  WBC: 4.82 (05-06 @ 03:47), 5.07 (05-05 @ 19:41), 6.50 (05-04 @ 23:52), 6.65 (05-04 @ 02:59)  Plt: 293 (05-06 @ 03:47), 284 (05-05 @ 19:41), 314 (05-04 @ 23:52), 349 (05-04 @ 02:59)    INR: 0.84 05-04-24 @ 00:34  PTT: 23.8 05-04-24 @ 00:34    MEDICATIONS:  acetaminophen     Tablet .. 650 milliGRAM(s) Oral every 6 hours PRN  atorvastatin 40 milliGRAM(s) Oral at bedtime  bisacodyl 5 milliGRAM(s) Oral at bedtime  chlorhexidine 4% Liquid 1 Application(s) Topical daily  enoxaparin Injectable 40 milliGRAM(s) SubCutaneous <User Schedule>  hydrALAZINE Injectable 5 milliGRAM(s) IV Push once  insulin glargine Injectable (LANTUS) 26 Unit(s) SubCutaneous at bedtime  insulin lispro (ADMELOG) corrective regimen sliding scale   SubCutaneous three times a day before meals  insulin lispro (ADMELOG) corrective regimen sliding scale   SubCutaneous at bedtime  insulin lispro Injectable (ADMELOG) 14 Unit(s) SubCutaneous three times a day before meals  niMODipine 30 milliGRAM(s) Oral every 2 hours  oxyCODONE    IR 5 milliGRAM(s) Oral every 4 hours PRN  polyethylene glycol 3350 17 Gram(s) Oral every 12 hours  senna 2 Tablet(s) Oral at bedtime    EXAMINATION:  please see exam from daytime     Assessment/Plan:   52 yo woman with DM who developed a severe headache on 5/3, found to have a HH2 mF1 SAH, angio neg for aneurysm. MRI brain and C spine neg.     PBD 3.      nimodipine 30 mg q2h   Keppra 500 mg BID for a potential unsecured aneurysm   SBP   goal euvolemia, 500cc bolus   CCD diet  Lantus increased to 26U with 14U premeal insulin   Lov ppx     Jacquelyn Rodriguez  Neurocritical Care Attending

## 2024-05-06 NOTE — PROGRESS NOTE ADULT - ASSESSMENT
Jessica Wolff  53F, no AC/AP, pmh diabetes, p/w severe sudden-onset bifrontal HA this AM with associated neck stiffness/blurry vision. CTH/CTA read as negative by radiologist but appears to have R perimes SAH (HH1, MF1). LP in ED w/ 05101 RBCs, and xanthochromic. Afebrile, WBC wnl. PLTs wnl, Coags wnl. Exam: intact    s/p 5/4 angiogram- negative    daily TCDs  SAH protocol

## 2024-05-06 NOTE — PROGRESS NOTE ADULT - SUBJECTIVE AND OBJECTIVE BOX
Gerardo Wilhelm MD, PGY-4  Endocrinology fellow  Available on [employer] Teams    Interval Events: No acute overnight events. Pt seen and examined. Tolerating PO, no nausea/vomitting. No hypoglycemia symptoms. Denies fevers, chills, CP, SOB, Abdominal pain, constipation, Diarrhea.      MEDICATIONS  (STANDING):  atorvastatin 40 milliGRAM(s) Oral at bedtime  bisacodyl 5 milliGRAM(s) Oral at bedtime  chlorhexidine 4% Liquid 1 Application(s) Topical daily  enoxaparin Injectable 40 milliGRAM(s) SubCutaneous <User Schedule>  hydrALAZINE Injectable 5 milliGRAM(s) IV Push once  insulin glargine Injectable (LANTUS) 26 Unit(s) SubCutaneous at bedtime  insulin lispro (ADMELOG) corrective regimen sliding scale   SubCutaneous three times a day before meals  insulin lispro (ADMELOG) corrective regimen sliding scale   SubCutaneous at bedtime  insulin lispro Injectable (ADMELOG) 14 Unit(s) SubCutaneous three times a day before meals  levETIRAcetam 500 milliGRAM(s) Oral two times a day  niMODipine 30 milliGRAM(s) Oral every 2 hours  polyethylene glycol 3350 17 Gram(s) Oral every 12 hours  senna 2 Tablet(s) Oral at bedtime    MEDICATIONS  (PRN):  acetaminophen     Tablet .. 650 milliGRAM(s) Oral every 6 hours PRN Temp greater or equal to 38.5C (101.3F), Mild Pain (1 - 3)  oxyCODONE    IR 5 milliGRAM(s) Oral every 4 hours PRN Moderate Pain (4 - 6)      Allergies    No Known Allergies    Intolerances          ================PHYSICAL EXAM======================  VITALS: T(C): 36.8 (05-06-24 @ 11:00)  T(F): 98.2 (05-06-24 @ 11:00), Max: 99.3 (05-06-24 @ 03:00)  HR: 90 (05-06-24 @ 14:00) (75 - 90)  BP: --  RR:  (11 - 20)  SpO2:  (95% - 100%)  Wt(kg): --  GENERAL: NAD, appears comfortable  EYES: No proptosis, no lid lag, anicteric  HEENT:  Atraumatic, Normocephalic, moist mucous membranes  RESPIRATORY: nonlabored respirations, no wheezing  PSYCH: Alert and awake  ===================================================    CAPILLARY BLOOD GLUCOSE      POCT Blood Glucose.: 200 mg/dL (06 May 2024 12:15)  POCT Blood Glucose.: 252 mg/dL (06 May 2024 07:59)  POCT Blood Glucose.: 236 mg/dL (05 May 2024 21:47)  POCT Blood Glucose.: 367 mg/dL (05 May 2024 17:32)  POCT Blood Glucose.: 322 mg/dL (05 May 2024 16:26)      05-06    138  |  108  |  16  ----------------------------<  210<H>  4.0   |  19<L>  |  1.19    eGFR: 55<L>    Ca    8.9      05-06  Mg     2.1     05-06  Phos  3.8     05-06    TPro  7.1  /  Alb  3.6  /  TBili  0.2  /  DBili  x   /  AST  20  /  ALT  22  /  AlkPhos  157<H>  05-03          Thyroid Function Tests:  05-04 @ 02:59 TSH 2.44 FreeT4 1.1 T3 60 Anti TPO -- Anti Thyroglobulin Ab -- TSI --

## 2024-05-06 NOTE — PROGRESS NOTE ADULT - SUBJECTIVE AND OBJECTIVE BOX
Patient seen and examined at bedside.    --Anticoagulation--  enoxaparin Injectable 40 milliGRAM(s) SubCutaneous <User Schedule>    T(C): 37.1 (05-05-24 @ 23:00), Max: 37.1 (05-05-24 @ 03:00)  HR: 85 (05-05-24 @ 23:00) (68 - 92)  BP: 108/59 (05-05-24 @ 06:00) (108/59 - 127/69)  RR: 18 (05-05-24 @ 23:00) (12 - 20)  SpO2: 97% (05-05-24 @ 23:00) (94% - 100%)  Wt(kg): --    Exam: intact

## 2024-05-06 NOTE — PROGRESS NOTE ADULT - ATTENDING COMMENTS
Following this 53 F with history of T2D here for SAH. Endocrinology consulted for T2D management. A1C >15.5. Home regimen: Novolin 14-20 units BID. Titrate basal/bolus to glucose goal of 100-180 while inpatient. For discharge, will need basal/bolus. Rest of the plan as above. Following this 53 F with history of T2D here for SAH. Endocrinology consulted for T2D management. A1C >15.5. Home regimen: Novolin 14-20 units BID. Titrate basal/bolus to glucose goal of 100-180 while inpatient. For discharge, will need basal/bolus. Rest of the plan as above. Patient is high risk with high level decision making due to uncontrolled diabetes with A1C> 10 which places patient at high risk for cardiovascular and cerebrovascular events. Patient with lability of glucose requiring close monitoring and insulin adjustments.

## 2024-05-06 NOTE — PROGRESS NOTE ADULT - ASSESSMENT
A/P: 53F, no AC/AP, pmh diabetes, p/w severe sudden-onset bifrontal HA this AM with associated neck stiffness/blurry vision now admitted with SAH. Endocrinology was consulted for management of diabetes mellitus.    #Type 2 Diabetes Mellitus  #DKA   - HbA1c - ordered and pending. None done recently    ; home regimen: Novolin 14-20 units BID   -egfr: 91  - Patient presented with BG in the 300s with DKA- AG 20, bicarb 18, BHB 7.3  - Glucose levels in 200s for the past 24 hours  Plan:  - Increase Lantus to 26 units nightly   - Increase Admelog to 14 units with meals   - Given patient's body habitus and presentation in DKA, would like to rule out RIYA- ordered Glutamic acid decarboxylase 65 antibodies, islet cell antibodies and ZnT8 antibodies (specimen recieved)  - Please check FSG before meals and QHS, or q6h while NPO  - Inpatient glucose goal 140-180 in the ICU setting   - Please keep patient on a diabetic, carb controlled diet once eating   - hypoglycemia orderset prn  - Discharge planning: Likely basal bolus- doses TBD    #Hypertension  - SBP goal <140 in the setting of SAH  - Management as per primary team    #Hyperlipidemia   in 5/4/24  - atorvastatin 40mg started on 5/4/24, continue with this on discharge and recheck with PCP    Patient is high risk and high level decision making, requiring ICU level of care.        Gerardo Wilhelm MD  Endocrine Fellow  Can be reached via [employer] teams.    For follow up questions, discharge recommendations, or new consults, please email [email or web address] ([email or web address]) or [email or web address] ([email or web address]) or call answering service at [phone] (weekdays); [phone] (nights/weekends).  For emergiencies please page fellow on call.

## 2024-05-06 NOTE — PROGRESS NOTE ADULT - SUBJECTIVE AND OBJECTIVE BOX
HPI: 54 y/o female with PMHx significant for diabetes mellitus, not on any anti-thrombotics, presents with severe sudden onset bifrontal headache associated neck stiffness and blurry vision. CTH initially read as negative. LP done to rule out SAH which demonstrated 99027 RBCs and xanthochromia.     Admission Scores  GCS: 15  HH: 2   MF: 1  NIHSS: 0      24 hour Events: Patient admitted to NSCU for SAH protocol.     05/04: Angio: negative for aneurysm  5/4- No acute overnight events. Patient s/p angio- negative for vascular abnormalities. Examination stable.   5/5- No acute overnight events. Patient's examination remains well. MRI brain done.  5/6- No acute overnight events. Patient's examination remains well.      REVIEW OF SYSTEMS: No CP/SOB, no n/v/d, no numbness/tingling, no weakness, chronic blurry vision      VITALS:   T(C): 37.4 (05-06-24 @ 03:00), Max: 37.4 (05-06-24 @ 03:00)  HR: 82 (05-06-24 @ 06:00) (75 - 92)  BP: --  BP(mean): --  RR: 11 (05-06-24 @ 06:00) (11 - 20)  SpO2: 99% (05-06-24 @ 06:00) (95% - 100%)    acetaminophen     Tablet .. 650 milliGRAM(s) Oral every 6 hours PRN  atorvastatin 40 milliGRAM(s) Oral at bedtime  bisacodyl 5 milliGRAM(s) Oral at bedtime  chlorhexidine 4% Liquid 1 Application(s) Topical daily  enoxaparin Injectable 40 milliGRAM(s) SubCutaneous <User Schedule>  hydrALAZINE Injectable 5 milliGRAM(s) IV Push once  insulin glargine Injectable (LANTUS) 20 Unit(s) SubCutaneous at bedtime  insulin lispro (ADMELOG) corrective regimen sliding scale   SubCutaneous Before meals and at bedtime  insulin lispro Injectable (ADMELOG) 8 Unit(s) SubCutaneous before breakfast  insulin lispro Injectable (ADMELOG) 8 Unit(s) SubCutaneous before lunch  insulin lispro Injectable (ADMELOG) 8 Unit(s) SubCutaneous before dinner  levETIRAcetam 500 milliGRAM(s) Oral two times a day  niMODipine 60 milliGRAM(s) Oral every 4 hours  oxyCODONE    IR 5 milliGRAM(s) Oral every 4 hours PRN  polyethylene glycol 3350 17 Gram(s) Oral every 12 hours  senna 2 Tablet(s) Oral at bedtime        05-05-24 @ 07:01  -  05-06-24 @ 07:00  --------------------------------------------------------  IN: 1920 mL / OUT: 750 mL / NET: 1170 mL                            9.3    4.82  )-----------( 293      ( 06 May 2024 03:47 )             27.8   05-06    138  |  108  |  16  ----------------------------<  210<H>  4.0   |  19<L>  |  1.19          IMAGING:  < from: MR Head w/wo IV Cont (05.04.24 @ 23:44) >  FINDINGS: Small amounts of scattered subarachnoid hemorrhage are again   seen within the perimesencephalic cisterns more asymmetric towards the   right side. Small amounts of layering intraventricular hemorrhage are   seen. Hemosiderin staining is seen outlining the cerebellar folia.    Minimal ventricular dilatation is appreciated which appears unchanged.    There is no shift of the midline structures or herniation.    < end of copied text >      EXAMINATION:  PHYSICAL EXAM:    Constitutional: sitting up in bed, comfortable    Neurological: Awake, alert oriented to person, place and time, Following Commands, PERRL, EOMI, No Gaze Preference, Face Symmetrical, Speech Fluent, No dysmetria.  Motor exam: 5/5 throughout, maintains all extremities antigravity                                               Sensation: [x] intact to light touch      Pulmonary: Clear to Auscultation, No rales, No rhonchi, No wheezes     Cardiovascular: S1, S2, Regular rate and rhythm     Gastrointestinal: Soft, Non-tender, Non-distended, LBM PTA    Extremities: No calf tenderness      HPI: 52 y/o female with PMHx significant for diabetes mellitus, not on any anti-thrombotics, presents with severe sudden onset bifrontal headache associated neck stiffness and blurry vision. CTH initially read as negative. LP done to rule out SAH which demonstrated 14092 RBCs and xanthochromia.     Admission Scores  GCS: 15  HH: 2   MF: 1  NIHSS: 0      24 hour Events: Patient admitted to NSCU for SAH protocol.     05/04: Angio: negative for aneurysm  5/4- No acute overnight events. Patient s/p angio- negative for vascular abnormalities. Examination stable.   5/5- No acute overnight events. Patient's examination remains well. MRI brain done.  5/6- No acute overnight events. Patient's examination remains intact.    REVIEW OF SYSTEMS: No CP/SOB, no n/v/d, no numbness/tingling, no weakness, chronic blurry vision      VITALS:   T(C): 37.4 (05-06-24 @ 03:00), Max: 37.4 (05-06-24 @ 03:00)  HR: 82 (05-06-24 @ 06:00) (75 - 92)  RR: 11 (05-06-24 @ 06:00) (11 - 20)  SpO2: 99% (05-06-24 @ 06:00) (95% - 100%)    acetaminophen     Tablet .. 650 milliGRAM(s) Oral every 6 hours PRN  atorvastatin 40 milliGRAM(s) Oral at bedtime  bisacodyl 5 milliGRAM(s) Oral at bedtime  chlorhexidine 4% Liquid 1 Application(s) Topical daily  enoxaparin Injectable 40 milliGRAM(s) SubCutaneous <User Schedule>  hydrALAZINE Injectable 5 milliGRAM(s) IV Push once  insulin glargine Injectable (LANTUS) 20 Unit(s) SubCutaneous at bedtime  insulin lispro (ADMELOG) corrective regimen sliding scale   SubCutaneous Before meals and at bedtime  insulin lispro Injectable (ADMELOG) 8 Unit(s) SubCutaneous before breakfast  insulin lispro Injectable (ADMELOG) 8 Unit(s) SubCutaneous before lunch  insulin lispro Injectable (ADMELOG) 8 Unit(s) SubCutaneous before dinner  levETIRAcetam 500 milliGRAM(s) Oral two times a day  niMODipine 60 milliGRAM(s) Oral every 4 hours  oxyCODONE    IR 5 milliGRAM(s) Oral every 4 hours PRN  polyethylene glycol 3350 17 Gram(s) Oral every 12 hours  senna 2 Tablet(s) Oral at bedtime        05-05-24 @ 07:01  -  05-06-24 @ 07:00  --------------------------------------------------------  IN: 1920 mL / OUT: 750 mL / NET: 1170 mL                            9.3    4.82  )-----------( 293      ( 06 May 2024 03:47 )             27.8   05-06    138  |  108  |  16  ----------------------------<  210<H>  4.0   |  19<L>  |  1.19          IMAGING:  < from: MR Head w/wo IV Cont (05.04.24 @ 23:44) >  FINDINGS: Small amounts of scattered subarachnoid hemorrhage are again   seen within the perimesencephalic cisterns more asymmetric towards the   right side. Small amounts of layering intraventricular hemorrhage are   seen. Hemosiderin staining is seen outlining the cerebellar folia.    Minimal ventricular dilatation is appreciated which appears unchanged.    There is no shift of the midline structures or herniation.    < end of copied text >      EXAMINATION:  PHYSICAL EXAM:    Constitutional: sitting up in bed, comfortable    Neurological: Awake, alert oriented to person, place and time, Following Commands  PERRL, EOMI, No Gaze Preference, Face Symmetrical, Speech Fluent, No dysmetria.  Motor exam: 5/5 throughout, maintains all extremities antigravity                                               Sensation: intact to light touch      Pulmonary: no accessory muscle use, normal chest expansion and WOB   Cardiovascular: S1, S2, Regular rate and rhythm   Gastrointestinal: Soft, Non-tender, Non-distended, LBM 5/5  Extremities: No calf tenderness

## 2024-05-06 NOTE — PROGRESS NOTE ADULT - ASSESSMENT
ASSESSMENT/PLAN: HH2 mF 1 subarachnoid hemorrhage, post-bleed day 2    NEURO:   #SAH angio negative  Neurochecks Q2H  Repeat CTH : stable  d/c keppra    #Delayed Cerebral Ischemia Management: Serial screening TCDs, euvolemia, nimodipine  Pain: PRN analgesics  Activity: [x] OOB as tolerated [] Bedrest [] PT [] OT [] PMNR    PULM:  Incentive spirometry  Maintain SpO2 >94%    CV: TTE- EF 63%, normal atria, normal LV function   SBP goal  mmHg   Atorvastatin 40 mg QD  L Radial A line     RENAL:  Maintain normonatremia, normovolemia  Monitor strict I/Os    GI:  Diet: CCD diet  GI prophylaxis [x] not indicated [] PPI [] other:  Bowel regimen [x] miralax [x] senna [] other:    ENDO:   Endocrine following  Goal euglycemia (-180), f/u A1c  Lantus 15 units at bedtime, Lispro 5 units before meals    HEME/ONC:  Monitor H/H  VTE prophylaxis: SCDs  Lovenox 40mg QD    ID:  Monitor for fevers    Dispo: stays in NSICU ASSESSMENT/PLAN: HH2 mF 1 subarachnoid hemorrhage, post-bleed day 2    NEURO:   #SAH angio negative  Neurochecks Q2H  ICP precautions    #Delayed Cerebral Ischemia Management: Serial screening TCDs, euvolemia, nimodipine  Pain: PRN analgesics  Activity: PT/OT    PULM:  Incentive spirometry  Maintain SpO2 >94%    CV: TTE- EF 63%, normal atria, normal LV function   SBP goal  mmHg   Atorvastatin 40 mg QD  L Radial A line     RENAL:  Maintain normonatremia, normovolemia  Monitor strict I/Os    GI:  Diet: CCD diet  GI prophylaxis [x] not indicated   Bowel regimen [x] miralax [x] senna [] other:    ENDO:   Endocrine following  Goal euglycemia (-180), f/u A1c  Lantus 20 units at bedtime, Lispro 8 units before meals    HEME/ONC:  Monitor H/H  VTE prophylaxis: SCDs  Lovenox 40mg QD    ID:  Monitor WBC and fever curve    Dispo: stays in [facility], d/w NSGY re:downgrade

## 2024-05-06 NOTE — CHART NOTE - NSCHARTNOTEFT_GEN_A_CORE
Transcranial doppler exam #1 (5/6/24) 1130am  mean velocity  cm/sec                              Left         Right  DONNY                    51           67  MCA                   73           59  PCA                    32,24       31,28  VERT                   31            48  BA                              41  Official report to follow.  Estrada

## 2024-05-07 ENCOUNTER — TRANSCRIPTION ENCOUNTER (OUTPATIENT)
Age: 54
End: 2024-05-07

## 2024-05-07 LAB
A1C WITH ESTIMATED AVERAGE GLUCOSE RESULT: >15.5 % — HIGH (ref 4–5.6)
ANION GAP SERPL CALC-SCNC: 11 MMOL/L — SIGNIFICANT CHANGE UP (ref 5–17)
APPEARANCE UR: ABNORMAL
BACTERIA # UR AUTO: ABNORMAL /HPF
BILIRUB UR-MCNC: NEGATIVE — SIGNIFICANT CHANGE UP
BUN SERPL-MCNC: 14 MG/DL — SIGNIFICANT CHANGE UP (ref 7–23)
CALCIUM SERPL-MCNC: 9.2 MG/DL — SIGNIFICANT CHANGE UP (ref 8.4–10.5)
CAST: 2 /LPF — SIGNIFICANT CHANGE UP (ref 0–4)
CHLORIDE SERPL-SCNC: 109 MMOL/L — HIGH (ref 96–108)
CO2 SERPL-SCNC: 20 MMOL/L — LOW (ref 22–31)
COLOR SPEC: YELLOW — SIGNIFICANT CHANGE UP
CREAT SERPL-MCNC: 0.87 MG/DL — SIGNIFICANT CHANGE UP (ref 0.5–1.3)
DIFF PNL FLD: ABNORMAL
EGFR: 80 ML/MIN/1.73M2 — SIGNIFICANT CHANGE UP
ESTIMATED AVERAGE GLUCOSE: >398 MG/DL — HIGH (ref 68–114)
GLUCOSE BLDC GLUCOMTR-MCNC: 151 MG/DL — HIGH (ref 70–99)
GLUCOSE BLDC GLUCOMTR-MCNC: 167 MG/DL — HIGH (ref 70–99)
GLUCOSE BLDC GLUCOMTR-MCNC: 167 MG/DL — HIGH (ref 70–99)
GLUCOSE BLDC GLUCOMTR-MCNC: 77 MG/DL — SIGNIFICANT CHANGE UP (ref 70–99)
GLUCOSE SERPL-MCNC: 167 MG/DL — HIGH (ref 70–99)
GLUCOSE UR QL: 500 MG/DL
HCT VFR BLD CALC: 25 % — LOW (ref 34.5–45)
HGB BLD-MCNC: 8.6 G/DL — LOW (ref 11.5–15.5)
ISLET CELL512 AB SER-ACNC: SIGNIFICANT CHANGE UP
KETONES UR-MCNC: NEGATIVE MG/DL — SIGNIFICANT CHANGE UP
LEUKOCYTE ESTERASE UR-ACNC: ABNORMAL
MAGNESIUM SERPL-MCNC: 2 MG/DL — SIGNIFICANT CHANGE UP (ref 1.6–2.6)
MCHC RBC-ENTMCNC: 29.6 PG — SIGNIFICANT CHANGE UP (ref 27–34)
MCHC RBC-ENTMCNC: 34.4 GM/DL — SIGNIFICANT CHANGE UP (ref 32–36)
MCV RBC AUTO: 85.9 FL — SIGNIFICANT CHANGE UP (ref 80–100)
NITRITE UR-MCNC: NEGATIVE — SIGNIFICANT CHANGE UP
NRBC # BLD: 0 /100 WBCS — SIGNIFICANT CHANGE UP (ref 0–0)
PH UR: 5.5 — SIGNIFICANT CHANGE UP (ref 5–8)
PHOSPHATE SERPL-MCNC: 3.6 MG/DL — SIGNIFICANT CHANGE UP (ref 2.5–4.5)
PLATELET # BLD AUTO: 256 K/UL — SIGNIFICANT CHANGE UP (ref 150–400)
POTASSIUM SERPL-MCNC: 3.9 MMOL/L — SIGNIFICANT CHANGE UP (ref 3.5–5.3)
POTASSIUM SERPL-SCNC: 3.9 MMOL/L — SIGNIFICANT CHANGE UP (ref 3.5–5.3)
PROT UR-MCNC: 100 MG/DL
RBC # BLD: 2.91 M/UL — LOW (ref 3.8–5.2)
RBC # FLD: 13.3 % — SIGNIFICANT CHANGE UP (ref 10.3–14.5)
RBC CASTS # UR COMP ASSIST: 1 /HPF — SIGNIFICANT CHANGE UP (ref 0–4)
REVIEW: SIGNIFICANT CHANGE UP
SODIUM SERPL-SCNC: 140 MMOL/L — SIGNIFICANT CHANGE UP (ref 135–145)
SP GR SPEC: 1.01 — SIGNIFICANT CHANGE UP (ref 1–1.03)
SQUAMOUS # UR AUTO: 1 /HPF — SIGNIFICANT CHANGE UP (ref 0–5)
UROBILINOGEN FLD QL: 0.2 MG/DL — SIGNIFICANT CHANGE UP (ref 0.2–1)
WBC # BLD: 3.42 K/UL — LOW (ref 3.8–10.5)
WBC # FLD AUTO: 3.42 K/UL — LOW (ref 3.8–10.5)
WBC UR QL: 108 /HPF — HIGH (ref 0–5)

## 2024-05-07 PROCEDURE — 99233 SBSQ HOSP IP/OBS HIGH 50: CPT

## 2024-05-07 PROCEDURE — 93888 INTRACRANIAL LIMITED STUDY: CPT | Mod: 26

## 2024-05-07 PROCEDURE — 99232 SBSQ HOSP IP/OBS MODERATE 35: CPT

## 2024-05-07 RX ORDER — CEFTRIAXONE 500 MG/1
1000 INJECTION, POWDER, FOR SOLUTION INTRAMUSCULAR; INTRAVENOUS EVERY 24 HOURS
Refills: 0 | Status: COMPLETED | OUTPATIENT
Start: 2024-05-07 | End: 2024-05-09

## 2024-05-07 RX ORDER — POTASSIUM CHLORIDE 20 MEQ
20 PACKET (EA) ORAL ONCE
Refills: 0 | Status: COMPLETED | OUTPATIENT
Start: 2024-05-07 | End: 2024-05-07

## 2024-05-07 RX ORDER — ACETAMINOPHEN 500 MG
1000 TABLET ORAL ONCE
Refills: 0 | Status: COMPLETED | OUTPATIENT
Start: 2024-05-07 | End: 2024-05-07

## 2024-05-07 RX ORDER — INSULIN LISPRO 100/ML
10 VIAL (ML) SUBCUTANEOUS
Refills: 0 | Status: DISCONTINUED | OUTPATIENT
Start: 2024-05-07 | End: 2024-05-08

## 2024-05-07 RX ORDER — NIMODIPINE 60 MG/10ML
60 SOLUTION ORAL EVERY 4 HOURS
Refills: 0 | Status: DISCONTINUED | OUTPATIENT
Start: 2024-05-07 | End: 2024-05-14

## 2024-05-07 RX ORDER — ONDANSETRON 8 MG/1
4 TABLET, FILM COATED ORAL ONCE
Refills: 0 | Status: COMPLETED | OUTPATIENT
Start: 2024-05-07 | End: 2024-05-07

## 2024-05-07 RX ADMIN — Medication 1000 MILLIGRAM(S): at 08:07

## 2024-05-07 RX ADMIN — NIMODIPINE 30 MILLIGRAM(S): 60 SOLUTION ORAL at 16:04

## 2024-05-07 RX ADMIN — NIMODIPINE 30 MILLIGRAM(S): 60 SOLUTION ORAL at 17:30

## 2024-05-07 RX ADMIN — Medication 100 MILLIGRAM(S): at 13:36

## 2024-05-07 RX ADMIN — NIMODIPINE 30 MILLIGRAM(S): 60 SOLUTION ORAL at 11:35

## 2024-05-07 RX ADMIN — OXYCODONE HYDROCHLORIDE 5 MILLIGRAM(S): 5 TABLET ORAL at 05:35

## 2024-05-07 RX ADMIN — CHLORHEXIDINE GLUCONATE 1 APPLICATION(S): 213 SOLUTION TOPICAL at 11:35

## 2024-05-07 RX ADMIN — Medication 100 MILLIGRAM(S): at 05:16

## 2024-05-07 RX ADMIN — POLYETHYLENE GLYCOL 3350 17 GRAM(S): 17 POWDER, FOR SOLUTION ORAL at 05:16

## 2024-05-07 RX ADMIN — Medication 5 MILLIGRAM(S): at 22:26

## 2024-05-07 RX ADMIN — NIMODIPINE 30 MILLIGRAM(S): 60 SOLUTION ORAL at 05:16

## 2024-05-07 RX ADMIN — Medication 400 MILLIGRAM(S): at 07:52

## 2024-05-07 RX ADMIN — SENNA PLUS 2 TABLET(S): 8.6 TABLET ORAL at 22:26

## 2024-05-07 RX ADMIN — Medication 2: at 11:48

## 2024-05-07 RX ADMIN — CEFTRIAXONE 100 MILLIGRAM(S): 500 INJECTION, POWDER, FOR SOLUTION INTRAMUSCULAR; INTRAVENOUS at 05:15

## 2024-05-07 RX ADMIN — INSULIN GLARGINE 26 UNIT(S): 100 INJECTION, SOLUTION SUBCUTANEOUS at 22:27

## 2024-05-07 RX ADMIN — POLYETHYLENE GLYCOL 3350 17 GRAM(S): 17 POWDER, FOR SOLUTION ORAL at 17:06

## 2024-05-07 RX ADMIN — ENOXAPARIN SODIUM 40 MILLIGRAM(S): 100 INJECTION SUBCUTANEOUS at 17:06

## 2024-05-07 RX ADMIN — ONDANSETRON 4 MILLIGRAM(S): 8 TABLET, FILM COATED ORAL at 07:51

## 2024-05-07 RX ADMIN — Medication 10 UNIT(S): at 17:28

## 2024-05-07 RX ADMIN — OXYCODONE HYDROCHLORIDE 5 MILLIGRAM(S): 5 TABLET ORAL at 06:00

## 2024-05-07 RX ADMIN — Medication 20 MILLIEQUIVALENT(S): at 01:40

## 2024-05-07 RX ADMIN — NIMODIPINE 30 MILLIGRAM(S): 60 SOLUTION ORAL at 01:40

## 2024-05-07 RX ADMIN — NIMODIPINE 30 MILLIGRAM(S): 60 SOLUTION ORAL at 07:34

## 2024-05-07 RX ADMIN — Medication 100 MILLIGRAM(S): at 22:27

## 2024-05-07 RX ADMIN — NIMODIPINE 30 MILLIGRAM(S): 60 SOLUTION ORAL at 13:39

## 2024-05-07 RX ADMIN — ATORVASTATIN CALCIUM 40 MILLIGRAM(S): 80 TABLET, FILM COATED ORAL at 22:27

## 2024-05-07 RX ADMIN — SODIUM CHLORIDE 500 MILLILITER(S): 9 INJECTION INTRAMUSCULAR; INTRAVENOUS; SUBCUTANEOUS at 00:45

## 2024-05-07 RX ADMIN — Medication 2: at 08:28

## 2024-05-07 RX ADMIN — Medication 14 UNIT(S): at 11:49

## 2024-05-07 RX ADMIN — Medication 14 UNIT(S): at 08:28

## 2024-05-07 RX ADMIN — NIMODIPINE 30 MILLIGRAM(S): 60 SOLUTION ORAL at 20:00

## 2024-05-07 NOTE — DIETITIAN INITIAL EVALUATION ADULT - NSPROEDAREADYLEARN_GEN_A_NUR
pt very sleepy at time of visit. remains in ICU. RD to follow up with education as able./acuteness of illness

## 2024-05-07 NOTE — SPEECH LANGUAGE PATHOLOGY EVALUATION - SLP DIAGNOSIS
Attempted to see patient for intervention however unavailable at time of attempt. Patient working with OT/PT. Will defer and re-attempt pending pt availability. Mabel Mackey MS CCC-SLP Prefer teams   extension [phone]#

## 2024-05-07 NOTE — PROGRESS NOTE ADULT - SUBJECTIVE AND OBJECTIVE BOX
HPI: 52 y/o female with PMHx significant for diabetes mellitus, not on any anti-thrombotics, presents with severe sudden onset bifrontal headache associated neck stiffness and blurry vision. CTH initially read as negative. LP done to rule out SAH which demonstrated 95075 RBCs and xanthochromia.     Admission Scores  GCS: 15  HH: 2   MF: 1  NIHSS: 0      24 hour Events: Patient admitted to NSCU for SAH protocol.     05/04: Angio: negative for aneurysm  5/4- No acute overnight events. Patient s/p angio- negative for vascular abnormalities. Examination stable.   5/5- No acute overnight events. Patient's examination remains well. MRI brain done.  5/6- No acute overnight events. Patient's examination remains intact.  5/7- No acute overnight events. Patient's examination remains intact.    REVIEW OF SYSTEMS: No CP/SOB, no n/v/d, no numbness/tingling, no weakness, chronic blurry vision    VITALS:   T(C): 37.2 (05-07-24 @ 01:00), Max: 37.2 (05-07-24 @ 01:00)  HR: 85 (05-07-24 @ 06:00) (76 - 98)  BP: --  BP(mean): --  RR: 27 (05-07-24 @ 06:00) (12 - 28)  SpO2: 97% (05-07-24 @ 06:00) (95% - 100%)    acetaminophen     Tablet .. 650 milliGRAM(s) Oral every 6 hours PRN  atorvastatin 40 milliGRAM(s) Oral at bedtime  bisacodyl 5 milliGRAM(s) Oral at bedtime  cefTRIAXone   IVPB 1000 milliGRAM(s) IV Intermittent every 24 hours  chlorhexidine 4% Liquid 1 Application(s) Topical daily  enoxaparin Injectable 40 milliGRAM(s) SubCutaneous <User Schedule>  hydrALAZINE Injectable 5 milliGRAM(s) IV Push once  insulin glargine Injectable (LANTUS) 26 Unit(s) SubCutaneous at bedtime  insulin lispro (ADMELOG) corrective regimen sliding scale   SubCutaneous three times a day before meals  insulin lispro (ADMELOG) corrective regimen sliding scale   SubCutaneous at bedtime  insulin lispro Injectable (ADMELOG) 14 Unit(s) SubCutaneous three times a day before meals  niMODipine 30 milliGRAM(s) Oral every 2 hours  oxyCODONE    IR 5 milliGRAM(s) Oral every 4 hours PRN  phenazopyridine 100 milliGRAM(s) Oral every 8 hours  polyethylene glycol 3350 17 Gram(s) Oral every 12 hours  senna 2 Tablet(s) Oral at bedtime        05-06-24 @ 07:01  -  05-07-24 @ 07:00  --------------------------------------------------------  IN: 2180 mL / OUT: 1750 mL / NET: 430 mL                          8.6    3.42  )-----------( 256      ( 07 May 2024 00:56 )             25.0   05-07    140  |  109<H>  |  14  ----------------------------<  167<H>  3.9   |  20<L>  |  0.87    IMAGING:  < from: MR Head w/wo IV Cont (05.04.24 @ 23:44) >  FINDINGS: Small amounts of scattered subarachnoid hemorrhage are again   seen within the perimesencephalic cisterns more asymmetric towards the   right side. Small amounts of layering intraventricular hemorrhage are   seen. Hemosiderin staining is seen outlining the cerebellar folia.    Minimal ventricular dilatation is appreciated which appears unchanged.    There is no shift of the midline structures or herniation.    < end of copied text >    Transcranial doppler exam #1 (5/6/24) 1130am  mean velocity  cm/sec                              Left         Right  DONNY                    51           67  MCA                   73           59  PCA                    32,24       31,28  VERT                   31            48  BA                              41  Official report to follow.  Estrada.    EXAMINATION:  PHYSICAL EXAM:    Constitutional: sitting up in bed, comfortable    Neurological: Awake, alert oriented to person, place and time, Following Commands  PERRL, EOMI, No Gaze Preference, Face Symmetrical, Speech Fluent, No dysmetria.  Motor exam: 5/5 throughout, maintains all extremities antigravity                                               Sensation: intact to light touch      Pulmonary: no accessory muscle use, normal chest expansion and WOB   Cardiovascular: S1, S2, Regular rate and rhythm   Gastrointestinal: Soft, Non-tender, Non-distended, LBM 5/5  Extremities: No calf tenderness      HPI: 52 y/o female with PMHx significant for diabetes mellitus, not on any anti-thrombotics, presents with severe sudden onset bifrontal headache associated neck stiffness and blurry vision. CTH initially read as negative. LP done to rule out SAH which demonstrated 07800 RBCs and xanthochromia.     Admission Scores  GCS: 15  HH: 2   MF: 1  NIHSS: 0      24 hour Events: Patient admitted to NSCU for SAH protocol.     05/04: Angio: negative for aneurysm  5/4- No acute overnight events. Patient s/p angio- negative for vascular abnormalities. Examination stable.   5/5- No acute overnight events. Patient's examination remains well. MRI brain done.  5/6- No acute overnight events. Patient's examination remains intact, +dysuria  5/7- started ctx for UTI, nauseous this AM, Patient's examination remains intact.    REVIEW OF SYSTEMS: No CP/SOB, no n/v/d, no numbness/tingling, no weakness, chronic blurry vision    VITALS:   T(C): 37.2 (05-07-24 @ 01:00), Max: 37.2 (05-07-24 @ 01:00)  HR: 85 (05-07-24 @ 06:00) (76 - 98)  BP: --  BP(mean): --  RR: 27 (05-07-24 @ 06:00) (12 - 28)  SpO2: 97% (05-07-24 @ 06:00) (95% - 100%)    acetaminophen     Tablet .. 650 milliGRAM(s) Oral every 6 hours PRN  atorvastatin 40 milliGRAM(s) Oral at bedtime  bisacodyl 5 milliGRAM(s) Oral at bedtime  cefTRIAXone   IVPB 1000 milliGRAM(s) IV Intermittent every 24 hours  chlorhexidine 4% Liquid 1 Application(s) Topical daily  enoxaparin Injectable 40 milliGRAM(s) SubCutaneous <User Schedule>  hydrALAZINE Injectable 5 milliGRAM(s) IV Push once  insulin glargine Injectable (LANTUS) 26 Unit(s) SubCutaneous at bedtime  insulin lispro (ADMELOG) corrective regimen sliding scale   SubCutaneous three times a day before meals  insulin lispro (ADMELOG) corrective regimen sliding scale   SubCutaneous at bedtime  insulin lispro Injectable (ADMELOG) 14 Unit(s) SubCutaneous three times a day before meals  niMODipine 30 milliGRAM(s) Oral every 2 hours  oxyCODONE    IR 5 milliGRAM(s) Oral every 4 hours PRN  phenazopyridine 100 milliGRAM(s) Oral every 8 hours  polyethylene glycol 3350 17 Gram(s) Oral every 12 hours  senna 2 Tablet(s) Oral at bedtime        05-06-24 @ 07:01  -  05-07-24 @ 07:00  --------------------------------------------------------  IN: 2180 mL / OUT: 1750 mL / NET: 430 mL                          8.6    3.42  )-----------( 256      ( 07 May 2024 00:56 )             25.0   05-07    140  |  109<H>  |  14  ----------------------------<  167<H>  3.9   |  20<L>  |  0.87    IMAGING:  < from: MR Head w/wo IV Cont (05.04.24 @ 23:44) >  FINDINGS: Small amounts of scattered subarachnoid hemorrhage are again   seen within the perimesencephalic cisterns more asymmetric towards the   right side. Small amounts of layering intraventricular hemorrhage are   seen. Hemosiderin staining is seen outlining the cerebellar folia.    Minimal ventricular dilatation is appreciated which appears unchanged.    There is no shift of the midline structures or herniation.    < end of copied text >    Transcranial doppler exam #1 (5/6/24) 1130am  mean velocity  cm/sec                              Left         Right  DONNY                    51           67  MCA                   73           59  PCA                    32,24       31,28  VERT                   31            48  BA                              41  Official report to follow.  Estrada.    EXAMINATION:  PHYSICAL EXAM:    Constitutional: sitting up in bed, comfortable    Neurological: Awake, alert oriented to person, place and time, Following Commands  PERRL, EOMI, No Gaze Preference, Face Symmetrical, Speech Fluent, No dysmetria.  Motor exam: 5/5 throughout, maintains all extremities antigravity                                               Sensation: intact to light touch      Pulmonary: no accessory muscle use, normal chest expansion and WOB   Cardiovascular: S1, S2, Regular rate and rhythm   Gastrointestinal: Soft, Non-tender, Non-distended, LBM 5/7  Extremities: No calf tenderness, mild R foot edema

## 2024-05-07 NOTE — DIETITIAN INITIAL EVALUATION ADULT - PERTINENT LABORATORY DATA
05-07    140  |  109<H>  |  14  ----------------------------<  167<H>  3.9   |  20<L>  |  0.87    Ca    9.2      07 May 2024 00:56  Phos  3.6     05-07  Mg     2.0     05-07    POCT Blood Glucose.: 167 mg/dL (05-07-24 @ 07:42)  A1C with Estimated Average Glucose Result: >15.5 % (05-04-24 @ 02:59)

## 2024-05-07 NOTE — DISCHARGE NOTE NURSING/CASE MANAGEMENT/SOCIAL WORK - PATIENT PORTAL LINK FT
You can access the FollowMyHealth Patient Portal offered by [facility] by registering at the following website: [email or web address]. By joining [facility]’s FollowMyHealth portal, you will also be able to view your health information using other applications (apps) compatible with our system.

## 2024-05-07 NOTE — DIETITIAN INITIAL EVALUATION ADULT - OTHER INFO
-PMHx significant for diabetes mellitus. most recent Hgb A1c >15.5% (5/4/24). home regimen: Novolin 14-20 units twice/day. presented in DKA.

## 2024-05-07 NOTE — DISCHARGE NOTE NURSING/CASE MANAGEMENT/SOCIAL WORK - NSDCPEFALRISK_GEN_ALL_CORE
For information on Fall & Injury Prevention, visit: [email or web address] OR  [email or web address] OR  https://www.cdc.gov/steadi/patient.html

## 2024-05-07 NOTE — PROGRESS NOTE ADULT - SUBJECTIVE AND OBJECTIVE BOX
Interval events:  TCDs wnl today.   No acute events on evening rounds.   Is and Os net +450cc.     VITALS:  T(C): , Max: 37.2 (05-07-24 @ 01:00)  HR:  (74 - 92)  BP: --  ABP:  (96/45 - 160/69)  RR:  (12 - 27)  SpO2:  (95% - 100%)  Wt(kg): --      05-06-24 @ 07:01  -  05-07-24 @ 07:00  --------------------------------------------------------  IN: 2180 mL / OUT: 1750 mL / NET: 430 mL    05-07-24 @ 07:01  -  05-07-24 @ 22:16  --------------------------------------------------------  IN: 550 mL / OUT: 100 mL / NET: 450 mL      LABS:  Na: 140 (05-07 @ 00:56), 138 (05-06 @ 03:47), 133 (05-05 @ 19:41), 136 (05-05 @ 05:01), 132 (05-04 @ 23:52)  K: 3.9 (05-07 @ 00:56), 4.0 (05-06 @ 03:47), 4.2 (05-05 @ 19:41), 3.8 (05-05 @ 05:01), 4.0 (05-04 @ 23:52)  Cl: 109 (05-07 @ 00:56), 108 (05-06 @ 03:47), 103 (05-05 @ 19:41), 107 (05-05 @ 05:01), 103 (05-04 @ 23:52)  CO2: 20 (05-07 @ 00:56), 19 (05-06 @ 03:47), 20 (05-05 @ 19:41), 17 (05-05 @ 05:01), 18 (05-04 @ 23:52)  BUN: 14 (05-07 @ 00:56), 16 (05-06 @ 03:47), 20 (05-05 @ 19:41), 11 (05-05 @ 05:01), 14 (05-04 @ 23:52)  Cr: 0.87 (05-07 @ 00:56), 1.19 (05-06 @ 03:47), 1.62 (05-05 @ 19:41), 1.02 (05-05 @ 05:01), 1.09 (05-04 @ 23:52)  Glu: 167(05-07 @ 00:56), 210(05-06 @ 03:47), 318(05-05 @ 19:41), 134(05-05 @ 05:01), 244(05-04 @ 23:52)    Hgb: 8.6 (05-07 @ 00:56), 9.3 (05-06 @ 03:47), 9.0 (05-05 @ 19:41), 9.3 (05-04 @ 23:52)  Hct: 25.0 (05-07 @ 00:56), 27.8 (05-06 @ 03:47), 26.7 (05-05 @ 19:41), 27.9 (05-04 @ 23:52)  WBC: 3.42 (05-07 @ 00:56), 4.82 (05-06 @ 03:47), 5.07 (05-05 @ 19:41), 6.50 (05-04 @ 23:52)  Plt: 256 (05-07 @ 00:56), 293 (05-06 @ 03:47), 284 (05-05 @ 19:41), 314 (05-04 @ 23:52)    MEDICATIONS:  acetaminophen     Tablet .. 650 milliGRAM(s) Oral every 6 hours PRN  atorvastatin 40 milliGRAM(s) Oral at bedtime  bisacodyl 5 milliGRAM(s) Oral at bedtime  cefTRIAXone   IVPB 1000 milliGRAM(s) IV Intermittent every 24 hours  chlorhexidine 4% Liquid 1 Application(s) Topical daily  enoxaparin Injectable 40 milliGRAM(s) SubCutaneous <User Schedule>  hydrALAZINE Injectable 5 milliGRAM(s) IV Push once  insulin glargine Injectable (LANTUS) 26 Unit(s) SubCutaneous at bedtime  insulin lispro (ADMELOG) corrective regimen sliding scale   SubCutaneous three times a day before meals  insulin lispro (ADMELOG) corrective regimen sliding scale   SubCutaneous at bedtime  insulin lispro Injectable (ADMELOG) 10 Unit(s) SubCutaneous three times a day before meals  niMODipine 30 milliGRAM(s) Oral every 2 hours  oxyCODONE    IR 5 milliGRAM(s) Oral every 4 hours PRN  phenazopyridine 100 milliGRAM(s) Oral every 8 hours  polyethylene glycol 3350 17 Gram(s) Oral every 12 hours  senna 2 Tablet(s) Oral at bedtime    EXAMINATION:  please see exam from daytime     Assessment/Plan:   52 yo woman with DM who developed a severe headache on 5/3, found to have a HH2 mF1 SAH, angio neg for aneurysm. MRI brain and C spine neg.     PBD 4.      nimodipine 60 mg q4h   SBP   goal euvolemia  CCD diet, LBM 5/7  Lantus increased to 26U with 10U premeal insulin   Lov ppx   ceftriaxone for UTI    Jacquelyn Rodriguez  Neurocritical Care Attending

## 2024-05-07 NOTE — PROGRESS NOTE ADULT - ASSESSMENT
ASSESSMENT/PLAN: HH2 mF 1 subarachnoid hemorrhage, negative angiogram    NEURO:   #SAH angio negative  Neurochecks Q4H  ICP precautions    #Delayed Cerebral Ischemia Management: Serial screening TCDs, euvolemia, nimodipine  Pain: PRN analgesics  Activity: PT/OT    PULM:  Incentive spirometry  Maintain SpO2 >94%    CV: TTE- EF 63%, normal atria, normal LV function   SBP goal  mmHg   Atorvastatin 40 mg QD  L Radial A line     RENAL:  Maintain normonatremia, normovolemia  Monitor strict I/Os    GI:  Diet: CCD diet  GI prophylaxis [x] not indicated   Bowel regimen [x] miralax [x] senna [] other:    ENDO:   Endocrine following  Goal euglycemia (-180), f/u A1c  Lantus 20 units at bedtime, Lispro 8 units before meals    HEME/ONC:  Monitor H/H  VTE prophylaxis: SCDs  Lovenox 40mg QD    ID:  Monitor WBC and fever curve ASSESSMENT/PLAN: HH2 mF 1 subarachnoid hemorrhage, negative angiogram    NEURO:   #SAH angio negative  Neurochecks Q4H  ICP precautions    #Delayed Cerebral Ischemia Management: Serial screening TCDs, euvolemia, nimodipine  Pain: PRN analgesics  Activity: PT/OT    PULM:  Incentive spirometry  Maintain SpO2 >94%    CV: TTE- EF 63%, normal atria, normal LV function   SBP goal  mmHg   Atorvastatin 40 mg QD  L Radial A line     RENAL:  Maintain normonatremia, normovolemia  Monitor strict I/Os    GI:  Diet: CCD diet  GI prophylaxis [x] not indicated   Bowel regimen [x] miralax [x] senna [] other:    ENDO:   Endocrine following  Goal euglycemia (-180), f/u A1c  Lantus 26 units at bedtime, Lispro 14 units before meals    HEME/ONC:  Monitor H/H  VTE prophylaxis: SCDs  Lovenox 40mg QD    ID:  Monitor WBC and fever curve ASSESSMENT/PLAN: HH2 mF 1 subarachnoid hemorrhage, negative angiogram    NEURO:   #SAH angio negative  Neurochecks Q2h, protected sleep  ICP precautions  Delayed Cerebral Ischemia Management: TCDs, euvolemia, nimodipine 30mg q2h  Pain: PRN analgesics  Activity: PT/OT    PULM:  Incentive spirometry  Maintain SpO2 >94%    CV: TTE- EF 63%, normal atria, normal LV function   SBP goal  mmHg   Atorvastatin 40 mg QD  L Radial A line     RENAL:  Maintain normonatremia, normovolemia  Monitor strict I/Os    GI:  Diet: CCD diet  GI prophylaxis [x] not indicated   Bowel regimen [x] miralax [x] senna     ENDO: A1c 15.5  Endocrine following  Goal euglycemia (-180)  Lantus 26 units at bedtime, Lispro 14 units before meals    HEME/ONC:  Monitor H/H  VTE prophylaxis: SCDs  Lovenox 40mg QD    ID:  Monitor WBC and fever curve  CTX 1g q12 x3d for UTI

## 2024-05-07 NOTE — PROGRESS NOTE ADULT - ASSESSMENT
Jessica Wolff  53F, no AC/AP, pmh diabetes, p/w severe sudden-onset bifrontal HA this AM with associated neck stiffness/blurry vision. CTH/CTA read as negative by radiologist but appears to have R perimes SAH (HH1, MF1). LP in ED w/ 14651 RBCs, and xanthochromic. Afebrile, WBC wnl. PLTs wnl, Coags wnl. Exam: intact    s/p 5/4 angiogram- negative    TCDs daily  Keep NSCU until PBD 7 (5/10)  PT: outpt

## 2024-05-07 NOTE — DIETITIAN INITIAL EVALUATION ADULT - ORAL INTAKE PTA/DIET HISTORY
visited pt at bedside this morning. reports good PO intake PTA. confirms NKFA. limited interview, pt sleepy at time of writer visit.

## 2024-05-07 NOTE — PROGRESS NOTE ADULT - SUBJECTIVE AND OBJECTIVE BOX
Interval history:  Patient states that she feels well  Denies any complaints   no episodes of hypoglycemia noted    MEDICATIONS  (STANDING):  atorvastatin 40 milliGRAM(s) Oral at bedtime  bisacodyl 5 milliGRAM(s) Oral at bedtime  cefTRIAXone   IVPB 1000 milliGRAM(s) IV Intermittent every 24 hours  chlorhexidine 4% Liquid 1 Application(s) Topical daily  enoxaparin Injectable 40 milliGRAM(s) SubCutaneous <User Schedule>  hydrALAZINE Injectable 5 milliGRAM(s) IV Push once  insulin glargine Injectable (LANTUS) 26 Unit(s) SubCutaneous at bedtime  insulin lispro (ADMELOG) corrective regimen sliding scale   SubCutaneous three times a day before meals  insulin lispro (ADMELOG) corrective regimen sliding scale   SubCutaneous at bedtime  insulin lispro Injectable (ADMELOG) 14 Unit(s) SubCutaneous three times a day before meals  niMODipine 30 milliGRAM(s) Oral every 2 hours  phenazopyridine 100 milliGRAM(s) Oral every 8 hours  polyethylene glycol 3350 17 Gram(s) Oral every 12 hours  senna 2 Tablet(s) Oral at bedtime    MEDICATIONS  (PRN):  acetaminophen     Tablet .. 650 milliGRAM(s) Oral every 6 hours PRN Temp greater or equal to 38.5C (101.3F), Mild Pain (1 - 3)  oxyCODONE    IR 5 milliGRAM(s) Oral every 4 hours PRN Moderate Pain (4 - 6)      Allergies    No Known Allergies    Intolerances      Review of Systems:  Constitutional: No fever  Eyes: No blurry vision  Neuro: No tremors  HEENT: No pain  Cardiovascular: No chest pain, palpitations  Respiratory: No SOB, no cough  GI: No nausea, vomiting, abdominal pain  : No dysuria  Skin: no rash  Psych: no depression  Endocrine: no polyuria, polydipsia  Hem/lymph: no swelling  Osteoporosis: no fractures    ALL OTHER SYSTEMS REVIEWED AND NEGATIVE    UNABLE TO OBTAIN    PHYSICAL EXAM:  VITALS: T(C): 36.9 (05-07-24 @ 15:00)  T(F): 98.5 (05-07-24 @ 15:00), Max: 98.9 (05-07-24 @ 01:00)  HR: 88 (05-07-24 @ 15:00) (74 - 92)  BP: --  RR:  (12 - 27)  SpO2:  (95% - 100%)  Wt(kg): --  GENERAL: NAD, well-groomed, well-developed  EYES: No proptosis, no lid lag, anicteric  HEENT:  Atraumatic, Normocephalic, moist mucous membranes  THYROID: Normal size, no palpable nodules  RESPIRATORY: Clear to auscultation bilaterally; No rales, rhonchi, wheezing, or rubs  CARDIOVASCULAR: Regular rate and rhythm; No murmurs; no peripheral edema  GI: Soft, nontender, non distended, normal bowel sounds  SKIN: Dry, intact, No rashes or lesions  MUSCULOSKELETAL: Full range of motion, normal strength  NEURO: sensation intact, extraocular movements intact, no tremor, normal reflexes  PSYCH: Alert and oriented x 3, normal affect, normal mood  CUSHING'S SIGNS: no striae    POCT Blood Glucose.: 151 mg/dL (05-07-24 @ 11:47)  POCT Blood Glucose.: 167 mg/dL (05-07-24 @ 07:42)  POCT Blood Glucose.: 190 mg/dL (05-06-24 @ 21:35)  POCT Blood Glucose.: 218 mg/dL (05-06-24 @ 16:38)  POCT Blood Glucose.: 200 mg/dL (05-06-24 @ 12:15)  POCT Blood Glucose.: 252 mg/dL (05-06-24 @ 07:59)  POCT Blood Glucose.: 236 mg/dL (05-05-24 @ 21:47)  POCT Blood Glucose.: 367 mg/dL (05-05-24 @ 17:32)  POCT Blood Glucose.: 322 mg/dL (05-05-24 @ 16:26)  POCT Blood Glucose.: 276 mg/dL (05-05-24 @ 11:58)  POCT Blood Glucose.: 154 mg/dL (05-05-24 @ 07:41)  POCT Blood Glucose.: 205 mg/dL (05-05-24 @ 01:13)  POCT Blood Glucose.: 264 mg/dL (05-04-24 @ 22:26)  POCT Blood Glucose.: 273 mg/dL (05-04-24 @ 22:26)  POCT Blood Glucose.: 165 mg/dL (05-04-24 @ 16:30)      05-07    140  |  109<H>  |  14  ----------------------------<  167<H>  3.9   |  20<L>  |  0.87    eGFR: 80    Ca    9.2      05-07  Mg     2.0     05-07  Phos  3.6     05-07            Thyroid Function Tests:  05-04 @ 02:59 TSH 2.44 FreeT4 1.1 T3 60 Anti TPO -- Anti Thyroglobulin Ab -- TSI --

## 2024-05-07 NOTE — DIETITIAN INITIAL EVALUATION ADULT - PERTINENT MEDS FT
MEDICATIONS  (STANDING):  atorvastatin 40 milliGRAM(s) Oral at bedtime  bisacodyl 5 milliGRAM(s) Oral at bedtime  cefTRIAXone   IVPB 1000 milliGRAM(s) IV Intermittent every 24 hours  chlorhexidine 4% Liquid 1 Application(s) Topical daily  enoxaparin Injectable 40 milliGRAM(s) SubCutaneous <User Schedule>  hydrALAZINE Injectable 5 milliGRAM(s) IV Push once  insulin glargine Injectable (LANTUS) 26 Unit(s) SubCutaneous at bedtime  insulin lispro (ADMELOG) corrective regimen sliding scale   SubCutaneous three times a day before meals  insulin lispro (ADMELOG) corrective regimen sliding scale   SubCutaneous at bedtime  insulin lispro Injectable (ADMELOG) 14 Unit(s) SubCutaneous three times a day before meals  niMODipine 30 milliGRAM(s) Oral every 2 hours  phenazopyridine 100 milliGRAM(s) Oral every 8 hours  polyethylene glycol 3350 17 Gram(s) Oral every 12 hours  senna 2 Tablet(s) Oral at bedtime    MEDICATIONS  (PRN):  acetaminophen     Tablet .. 650 milliGRAM(s) Oral every 6 hours PRN Temp greater or equal to 38.5C (101.3F), Mild Pain (1 - 3)  oxyCODONE    IR 5 milliGRAM(s) Oral every 4 hours PRN Moderate Pain (4 - 6)

## 2024-05-07 NOTE — PROGRESS NOTE ADULT - ASSESSMENT
A/P: 53F, no AC/AP, pmh diabetes, p/w severe sudden-onset bifrontal HA this AM with associated neck stiffness/blurry vision now admitted with SAH. Endocrinology was consulted for management of diabetes mellitus.    #Type 2 Diabetes Mellitus  #DKA   - HbA1c >15% ; home regimen: Novolin 14-20 units BID   -egfr: 91  - Patient presented with BG in the 300s with DKA- AG 20, bicarb 18, BHB 7.3  - Glucose now stable   Plan:  - Continue with Lantus to 26 units nightly   - Continue with Admelog to 14 units with meals   - Given patient's body habitus and presentation in DKA, would like to rule out RIYA- ordered Glutamic acid decarboxylase 65 antibodies, islet cell antibodies and ZnT8 antibodies (specimen recieved)  - Please check FSG before meals and QHS, or q6h while NPO  - Inpatient glucose goal 140-180 in the ICU setting   - Please keep patient on a diabetic, carb controlled diet once eating   - hypoglycemia orderset prn  - Discharge planning: Likely basal bolus- can discharge on above basal/bolus regimen as long as sugars remain stable     #Hypertension  - SBP goal <140 in the setting of SAH  - Management as per primary team    #Hyperlipidemia   in 5/4/24  - atorvastatin 40mg started on 5/4/24, continue with this on discharge and recheck with PCP    Patient is high risk and high level decision making, requiring ICU level of care.    Elena Rangel,    Attending Physician   Department of Endocrinology, Diabetes and Metabolism     If before 9AM or after 5PM, or on weekends/holidays, please call the Endocrine answering service for assistance ([phone]).  For nonurgent matters, please email [email or web address] for assistance.

## 2024-05-07 NOTE — CHART NOTE - NSCHARTNOTEFT_GEN_A_CORE
Transcranial doppler exam #1 (5/6/24) 1130am  mean velocity  cm/sec                              Left         Right  DONNY                    51           67  MCA                   73           59  PCA                    32,24       31,28  VERT                   31            48  BA                              41  Official report to follow.  Estrada    Transcranial doppler exam #2 (5/7/24) 11am  mean velocity  cm/sec                              Left         Right  DONNY                    65           78  MCA                   90           70   PCA                    33,42       34,32  Official report to follow.  Estrada

## 2024-05-07 NOTE — DIETITIAN INITIAL EVALUATION ADULT - ADD RECOMMEND
1) Will continue to monitor PO intake, weight, labs, skin, GI status and diet 2) RD to add No Sugar Added Mighty Shake supplement twice daily to aid in meeting nutrient needs

## 2024-05-07 NOTE — SPEECH LANGUAGE PATHOLOGY EVALUATION - COMMENTS
Continued history:   -s/p 5/4 angiogram-negative for vascular abnormalities. Examination stable.   -Endocrinology was consulted for management of diabetes mellitus  -Admission Scores GCS: 15  HH: 2   MF: 1  NIHSS: 0      Speech & Swallow: no reports in SCM.      Dysphagia screen not documented in flow sheet for screening; pt on a regular diet.

## 2024-05-07 NOTE — PROGRESS NOTE ADULT - SUBJECTIVE AND OBJECTIVE BOX
Patient seen and examined at bedside.    --Anticoagulation--  enoxaparin Injectable 40 milliGRAM(s) SubCutaneous <User Schedule>    T(C): 36.8 (05-06-24 @ 19:00), Max: 37.4 (05-06-24 @ 03:00)  HR: 80 (05-07-24 @ 00:19) (75 - 98)  BP: --  RR: 15 (05-07-24 @ 00:00) (11 - 28)  SpO2: 96% (05-07-24 @ 00:00) (95% - 99%)  Wt(kg): --    Exam: intact

## 2024-05-07 NOTE — DIETITIAN INITIAL EVALUATION ADULT - REASON INDICATOR FOR ASSESSMENT
seen for length of stay. information obtained from pt, electronic medical record, RN, team during interdisciplinary rounds

## 2024-05-08 DIAGNOSIS — E11.10 TYPE 2 DIABETES MELLITUS WITH KETOACIDOSIS WITHOUT COMA: ICD-10-CM

## 2024-05-08 LAB
ANION GAP SERPL CALC-SCNC: 9 MMOL/L — SIGNIFICANT CHANGE UP (ref 5–17)
BUN SERPL-MCNC: 12 MG/DL — SIGNIFICANT CHANGE UP (ref 7–23)
CALCIUM SERPL-MCNC: 9.3 MG/DL — SIGNIFICANT CHANGE UP (ref 8.4–10.5)
CHLORIDE SERPL-SCNC: 109 MMOL/L — HIGH (ref 96–108)
CO2 SERPL-SCNC: 22 MMOL/L — SIGNIFICANT CHANGE UP (ref 22–31)
CREAT SERPL-MCNC: 0.99 MG/DL — SIGNIFICANT CHANGE UP (ref 0.5–1.3)
CULTURE RESULTS: SIGNIFICANT CHANGE UP
CULTURE RESULTS: SIGNIFICANT CHANGE UP
EGFR: 69 ML/MIN/1.73M2 — SIGNIFICANT CHANGE UP
GAD65 AB SER-MCNC: 0 NMOL/L — SIGNIFICANT CHANGE UP
GLUCOSE BLDC GLUCOMTR-MCNC: 133 MG/DL — HIGH (ref 70–99)
GLUCOSE BLDC GLUCOMTR-MCNC: 176 MG/DL — HIGH (ref 70–99)
GLUCOSE BLDC GLUCOMTR-MCNC: 218 MG/DL — HIGH (ref 70–99)
GLUCOSE BLDC GLUCOMTR-MCNC: 225 MG/DL — HIGH (ref 70–99)
GLUCOSE SERPL-MCNC: 128 MG/DL — HIGH (ref 70–99)
HCT VFR BLD CALC: 25.1 % — LOW (ref 34.5–45)
HGB BLD-MCNC: 8.2 G/DL — LOW (ref 11.5–15.5)
MAGNESIUM SERPL-MCNC: 2 MG/DL — SIGNIFICANT CHANGE UP (ref 1.6–2.6)
MCHC RBC-ENTMCNC: 28.8 PG — SIGNIFICANT CHANGE UP (ref 27–34)
MCHC RBC-ENTMCNC: 32.7 GM/DL — SIGNIFICANT CHANGE UP (ref 32–36)
MCV RBC AUTO: 88.1 FL — SIGNIFICANT CHANGE UP (ref 80–100)
NRBC # BLD: 0 /100 WBCS — SIGNIFICANT CHANGE UP (ref 0–0)
PHOSPHATE SERPL-MCNC: 3.6 MG/DL — SIGNIFICANT CHANGE UP (ref 2.5–4.5)
PLATELET # BLD AUTO: 286 K/UL — SIGNIFICANT CHANGE UP (ref 150–400)
POTASSIUM SERPL-MCNC: 3.9 MMOL/L — SIGNIFICANT CHANGE UP (ref 3.5–5.3)
POTASSIUM SERPL-SCNC: 3.9 MMOL/L — SIGNIFICANT CHANGE UP (ref 3.5–5.3)
RBC # BLD: 2.85 M/UL — LOW (ref 3.8–5.2)
RBC # FLD: 13.7 % — SIGNIFICANT CHANGE UP (ref 10.3–14.5)
SODIUM SERPL-SCNC: 140 MMOL/L — SIGNIFICANT CHANGE UP (ref 135–145)
SPECIMEN SOURCE: SIGNIFICANT CHANGE UP
SPECIMEN SOURCE: SIGNIFICANT CHANGE UP
WBC # BLD: 3.91 K/UL — SIGNIFICANT CHANGE UP (ref 3.8–10.5)
WBC # FLD AUTO: 3.91 K/UL — SIGNIFICANT CHANGE UP (ref 3.8–10.5)

## 2024-05-08 PROCEDURE — 99233 SBSQ HOSP IP/OBS HIGH 50: CPT

## 2024-05-08 PROCEDURE — 99232 SBSQ HOSP IP/OBS MODERATE 35: CPT

## 2024-05-08 PROCEDURE — 93888 INTRACRANIAL LIMITED STUDY: CPT | Mod: 26

## 2024-05-08 RX ORDER — INSULIN LISPRO 100/ML
12 VIAL (ML) SUBCUTANEOUS
Refills: 0 | Status: DISCONTINUED | OUTPATIENT
Start: 2024-05-08 | End: 2024-05-10

## 2024-05-08 RX ORDER — INSULIN GLARGINE 100 [IU]/ML
24 INJECTION, SOLUTION SUBCUTANEOUS AT BEDTIME
Refills: 0 | Status: DISCONTINUED | OUTPATIENT
Start: 2024-05-08 | End: 2024-05-09

## 2024-05-08 RX ADMIN — ATORVASTATIN CALCIUM 40 MILLIGRAM(S): 80 TABLET, FILM COATED ORAL at 22:24

## 2024-05-08 RX ADMIN — ENOXAPARIN SODIUM 40 MILLIGRAM(S): 100 INJECTION SUBCUTANEOUS at 17:39

## 2024-05-08 RX ADMIN — POLYETHYLENE GLYCOL 3350 17 GRAM(S): 17 POWDER, FOR SOLUTION ORAL at 05:21

## 2024-05-08 RX ADMIN — Medication 100 MILLIGRAM(S): at 05:21

## 2024-05-08 RX ADMIN — Medication 10 UNIT(S): at 12:01

## 2024-05-08 RX ADMIN — NIMODIPINE 60 MILLIGRAM(S): 60 SOLUTION ORAL at 05:22

## 2024-05-08 RX ADMIN — Medication 10 UNIT(S): at 08:05

## 2024-05-08 RX ADMIN — Medication 2: at 16:42

## 2024-05-08 RX ADMIN — NIMODIPINE 60 MILLIGRAM(S): 60 SOLUTION ORAL at 14:41

## 2024-05-08 RX ADMIN — Medication 4: at 12:00

## 2024-05-08 RX ADMIN — CHLORHEXIDINE GLUCONATE 1 APPLICATION(S): 213 SOLUTION TOPICAL at 22:24

## 2024-05-08 RX ADMIN — NIMODIPINE 60 MILLIGRAM(S): 60 SOLUTION ORAL at 01:47

## 2024-05-08 RX ADMIN — NIMODIPINE 60 MILLIGRAM(S): 60 SOLUTION ORAL at 17:39

## 2024-05-08 RX ADMIN — NIMODIPINE 60 MILLIGRAM(S): 60 SOLUTION ORAL at 09:28

## 2024-05-08 RX ADMIN — Medication 100 MILLIGRAM(S): at 14:41

## 2024-05-08 RX ADMIN — NIMODIPINE 60 MILLIGRAM(S): 60 SOLUTION ORAL at 22:25

## 2024-05-08 RX ADMIN — CEFTRIAXONE 100 MILLIGRAM(S): 500 INJECTION, POWDER, FOR SOLUTION INTRAMUSCULAR; INTRAVENOUS at 05:21

## 2024-05-08 RX ADMIN — INSULIN GLARGINE 24 UNIT(S): 100 INJECTION, SOLUTION SUBCUTANEOUS at 22:25

## 2024-05-08 RX ADMIN — Medication 12 UNIT(S): at 16:42

## 2024-05-08 RX ADMIN — Medication 100 MILLIGRAM(S): at 22:25

## 2024-05-08 NOTE — PROGRESS NOTE ADULT - ATTENDING COMMENTS
remains intact.  follow-up repeat TCDs today.  if velocities unremarkable and patient remains intact, will d/w NSG re: timing for downgrade from ICU.

## 2024-05-08 NOTE — PROGRESS NOTE ADULT - ASSESSMENT
53F w/h/o uncontrolled T2DM (A1C >15%) on basal/bolus insulin therapy with poor adherence. DM c/b CAD/MI/CVA. Also HTN/HLD. Here with severe sudden-onset bifrontal HA with associated neck stiffness/blurry vision >  admitted with SAH/DKA (AG 20, bicarb 18, BHB 7.3)/UTI. Endocrinology was consulted for management of diabetes mellitus. Tolerating POs with variable PO intake due to fear of hyperglycemia. Preventively decrease basal insulin and increase meal time insulin to keep BG goal 100 to 180s. Pt afraid to eat so she doesn't have hyperglycemia. However, pt instructed to eat as she would eat at home in order to determine real insulin needs. pt verbalized understanding and agree with plan.     Clarified Home insulin regimen: Pt showed provider pic of insulins she takes at home. Basaglar  20 units at hs and Novolog 14 to 20 units ac meals> states having issues getting Novolog filled at pharmacy.  Pt was also on G6 CGM but was always alarming for high BGs so she doesn't want any CMG at this time. Wants to use glucose meter.     Met with patient and reviewed the following:  -DM complications including CAD/MI/CVA and now DKA  -A1c LEVEL: Present and goal  -Blood glucose goals: 100s to 150s as out pt  -Glucose monitoring frequency: ac and hs  -Healthy eating and portion control. Basic carb reviewed done   -Insulin(s) action, time of administration and side effects. Need to adhere to basal/bolus insulin to prevent further DKA. Pt is likely insulin deficient due to prolonged uncontrolled DM  -Importance of follow up care. Needs endo  Pt able to verbalize understanding regarding the need for glucose monitoring, diet, DM meds and follow up care.

## 2024-05-08 NOTE — SPEECH LANGUAGE PATHOLOGY EVALUATION - COMMENTS
Continued history:   -s/p 5/4 angiogram-negative for vascular abnormalities. Examination stable.   -Endocrinology was consulted for management of diabetes mellitus  -Admission Scores GCS: 15  HH: 2   MF: 1  NIHSS: 0      Speech & Swallow: no reports in SCM.      *Dysphagia screen not documented in flow sheet for screening; pt on a regular diet. d/w LETY Gil patient with no eye glasses - poor eye sight pt declined 2/2 eyesight ; noted limitations with digits

## 2024-05-08 NOTE — PROGRESS NOTE ADULT - ASSESSMENT
ASSESSMENT/PLAN: HH2 mF 1 subarachnoid hemorrhage, negative angiogram    NEURO:   #SAH angio negative  can likely dc nimodipine 30mg q2h  #Pain: PRN analgesics  #Activity: PT/OT    PULM:  Incentive spirometry  Maintain SpO2 >94%    CV: TTE- EF 63%, normal atria, normal LV function   SBP goal  mmHg   Atorvastatin 40 mg QD  L Radial A line     RENAL:  Maintain normonatremia, normovolemia  Monitor strict I/Os    GI:  Diet: CCD diet  GI prophylaxis [x] not indicated   Bowel regimen [x] miralax [x] senna     ENDO: A1c 15.5  Endocrine following  Goal euglycemia (-180)  Lantus 26 units at bedtime, Lispro 10 units before meals    HEME/ONC:  Monitor H/H  VTE prophylaxis: SCDs  Lovenox 40mg QD    ID:  Monitor WBC and fever curve  CTX 1g q12 x3d for UTI    full code    dispo nsicu

## 2024-05-08 NOTE — SPEECH LANGUAGE PATHOLOGY EVALUATION - H & P REVIEW
TUSHAR SILVER is a 53F, no AC/AP, pmh diabetes, p/w severe sudden-onset bifrontal HA with associated neck stiffness/blurry vision;+1 episode of nbnb vomiting.  CTH/CTA read as negative by radiologist but appears to have R perimes SAH (HH1, MF1). LP in ED w/ 86051 RBCs, and xanthochromic./yes
TUSHAR SILVER is a 53F, no AC/AP, pmh diabetes, p/w severe sudden-onset bifrontal HA with associated neck stiffness/blurry vision;+1 episode of nbnb vomiting.  CTH/CTA read as negative by radiologist but appears to have R perimes SAH (HH1, MF1). LP in ED w/ 79486 RBCs, and xanthochromic./yes

## 2024-05-08 NOTE — PROGRESS NOTE ADULT - PROBLEM SELECTOR PLAN 3
LDL goal <55 due to CAD/DM/CVA  Pt  in 5/4/24  Statin as noted above  Consider low cholesterol diet   Manage per primary team   F/u levels as out pt

## 2024-05-08 NOTE — PROGRESS NOTE ADULT - SUBJECTIVE AND OBJECTIVE BOX
HPI: 54 y/o female with PMHx significant for diabetes mellitus, not on any anti-thrombotics, presents with severe sudden onset bifrontal headache associated neck stiffness and blurry vision. CTH initially read as negative. LP done to rule out SAH which demonstrated 90019 RBCs and xanthochromia.     Admission Scores  GCS: 15  HH: 2   MF: 1  NIHSS: 0      24 hour Events: PALOMO      EXAMINATION:  Constitutional: sitting up in bed, comfortable    Neurological: Awake, alert oriented to person, place and time, Following Commands  PERRL, EOMI, No Gaze Preference, Face Symmetrical, Speech Fluent, No dysmetria.  Motor exam: 5/5 throughout, maintains all extremities antigravity                                               Sensation: intact to light touch      Pulmonary: no accessory muscle use, normal chest expansion and WOB   Cardiovascular: S1, S2, Regular rate and rhythm   Gastrointestinal: Soft, Non-tender, Non-distended, LBM 5/7  Extremities: No calf tenderness, mild R foot edema      Age: 53y  LOS: 5d    Vital Signs:    T(C): 36.9 (24 @ 15:00), Max: 37.2 (24 @ 23:00)  HR: 78 (24 @ 15:00) (78 - 86)  BP: --  RR: 19 (24 @ 15:00) (11 - 20)  SpO2: 99% (24 @ 15:00) (92% - 100%)    Medications:    acetaminophen     Tablet .. 650 milliGRAM(s) every 6 hours PRN  atorvastatin 40 milliGRAM(s) at bedtime  bisacodyl 5 milliGRAM(s) at bedtime  cefTRIAXone   IVPB 1000 milliGRAM(s) every 24 hours  chlorhexidine 4% Liquid 1 Application(s) daily  enoxaparin Injectable 40 milliGRAM(s) <User Schedule>  hydrALAZINE Injectable 5 milliGRAM(s) once  insulin glargine Injectable (LANTUS) 24 Unit(s) at bedtime  insulin lispro (ADMELOG) corrective regimen sliding scale   three times a day before meals  insulin lispro (ADMELOG) corrective regimen sliding scale   at bedtime  insulin lispro Injectable (ADMELOG) 12 Unit(s) three times a day before meals  niMODipine 60 milliGRAM(s) every 4 hours  oxyCODONE    IR 5 milliGRAM(s) every 4 hours PRN  phenazopyridine 100 milliGRAM(s) every 8 hours  polyethylene glycol 3350 17 Gram(s) every 12 hours  senna 2 Tablet(s) at bedtime      Labs:  Blood type, Baby Cord: [ @ 03:03] N/A  Blood type, Baby:  03:03 ABO: A Rh:Positive DC:N/A                8.2   3.91 )---------( 286   [ @ 05:25]            25.1  S:N/A%  B:N/A% [place]:N/A% Myelo:N/A% Promyelo:N/A%  Blasts:N/A% Lymph:N/A% Mono:N/A% Eos:N/A% Baso:N/A% Retic:N/A%            8.6   3.42 )---------( 256   [ @ 00:56]            25.0  S:N/A%  B:N/A% [place]:N/A% Myelo:N/A% Promyelo:N/A%  Blasts:N/A% Lymph:N/A% Mono:N/A% Eos:N/A% Baso:N/A% Retic:N/A%    140  |109  |12     --------------------(128     [ @ 05:25]  3.9  |22   |0.99     Ca:9.3   M.0   Phos:3.6    140  |109  |14     --------------------(167     [ @ 00:56]  3.9  |20   |0.87     Ca:9.2   M.0   Phos:3.6      Bili T/D [ @ 16:10] - 0.2/N/A    Alkaline Phosphatase [] - 157 Albumin [] - 3.6   AST:20 | ALT:22 | GGT:N/A       POCT Glucose: 176  [24 @ 16:40],  225  [24 @ 11:58],  133  [24 @ 07:33],  167  [24 @ 22:24]  TFT's [] TSH: 2.44 T4:6.9 fT4: 1.1          Urinalysis Basic - ( 08 May 2024 05:25 )    Color: x / Appearance: x / SG: x / pH: x  Gluc: 128 mg/dL / Ketone: x  / Bili: x / Urobili: x   Blood: x / Protein: x / Nitrite: x   Leuk Esterase: x / RBC: x / WBC x   Sq Epi: x / Non Sq Epi: x / Bacteria: x              Culture - Urine (collected 24 @ 03:18)  Final Report:    <10,000 CFU/mL Normal Urogenital Silke

## 2024-05-08 NOTE — PROGRESS NOTE ADULT - NSPROGADDITIONALINFOA_GEN_ALL_CORE
-Plan discussed with pt/team.  Contact info: [phone] (24/7). pager [phone]  Amion on [place]  Teams on M-T-W-F. Unavailable Thu/Weekends/Holidays  Provided face to face education as well as assessed  pt/labs/meds and discussed plan with primary team  Adjusting insulin  Discharge plan  Follow up care

## 2024-05-08 NOTE — PROGRESS NOTE ADULT - ASSESSMENT
ASSESSMENT/PLAN: HH2 mF 1 subarachnoid hemorrhage, negative angiogram    NEURO:   #SAH angio negative  Neurochecks Q2h, protected sleep  ICP precautions  Delayed Cerebral Ischemia Management: TCDs, euvolemia, nimodipine 30mg q2h  Pain: PRN analgesics  Activity: PT/OT    PULM:  Incentive spirometry  Maintain SpO2 >94%    CV: TTE- EF 63%, normal atria, normal LV function   SBP goal  mmHg   Atorvastatin 40 mg QD  L Radial A line     RENAL:  Maintain normonatremia, normovolemia  Monitor strict I/Os    GI:  Diet: CCD diet  GI prophylaxis [x] not indicated   Bowel regimen [x] miralax [x] senna     ENDO: A1c 15.5  Endocrine following  Goal euglycemia (-180)  Lantus 26 units at bedtime, Lispro 10 units before meals    HEME/ONC:  Monitor H/H  VTE prophylaxis: SCDs  Lovenox 40mg QD    ID:  Monitor WBC and fever curve  CTX 1g q12 x3d for UTI

## 2024-05-08 NOTE — PROGRESS NOTE ADULT - SUBJECTIVE AND OBJECTIVE BOX
Patient seen and examined at bedside.    --Anticoagulation--  enoxaparin Injectable 40 milliGRAM(s) SubCutaneous <User Schedule>    T(C): 37.2 (05-07-24 @ 23:00), Max: 37.2 (05-07-24 @ 01:00)  HR: 86 (05-07-24 @ 23:00) (74 - 92)  BP: --  RR: 20 (05-07-24 @ 23:00) (12 - 27)  SpO2: 97% (05-07-24 @ 23:00) (95% - 100%)  Wt(kg): --    Exam: intact

## 2024-05-08 NOTE — PROGRESS NOTE ADULT - ASSESSMENT
Jessica Wolff  53F, no AC/AP, pmh diabetes, p/w severe sudden-onset bifrontal HA this AM with associated neck stiffness/blurry vision. CTH/CTA read as negative by radiologist but appears to have R perimes SAH (HH1, MF1). LP in ED w/ 36937 RBCs, and xanthochromic. Afebrile, WBC wnl. PLTs wnl, Coags wnl. Exam: intact    s/p 5/4 angiogram- negative    TCDs daily  Keep NSCU until PBD 7 (5/10)  PT: outpt

## 2024-05-08 NOTE — CHART NOTE - NSCHARTNOTEFT_GEN_A_CORE
Transcranial doppler exam #1 (5/6/24) 1130am  mean velocity  cm/sec                              Left         Right  DONNY                    51           67  MCA                   73           59  PCA                    32,24       31,28  VERT                   31            48  BA                              41  Official report to follow.  Estrada    Transcranial doppler exam #2 (5/7/24) 11am  mean velocity  cm/sec                              Left         Right  DONNY                    65           78  MCA                   90           70   PCA                    33,42       34,32  Official report to follow.  Estrada    Transcranial doppler exam #2 (5/8/24) 11am  mean velocity  cm/sec                              Left         Right  DONNY                    63           67  MCA                   72           64  PCA                    P2-25       20,27  Official report to follow.  Estrada

## 2024-05-08 NOTE — PROGRESS NOTE ADULT - SUBJECTIVE AND OBJECTIVE BOX
DIABETES FOLLOW UP NOTE: Saw pt earlier today    Chief Complaint: Endocrine consult requested for management of DM    INTERVAL HX: pt stable, eating lunch at time of visit. States she doesn't want to eat because she had breakfast this am and now her BG is high. Denies any s/sx of DKA, no HA. BG levels 70s to 200s in the last 24hours. Reports that at home she doesn't test BG because is always "HI". Also misses insulin doses on and off.       Review of Systems:  General: As above  Cardiovascular: No chest pain, palpitations  Respiratory: No SOB, no cough  GI: No nausea, vomiting, abdominal pain  Endocrine: No polyuria, polydipsia or S&Sx of hypoglycemia    Allergies    No Known Allergies    Intolerances      MEDICATIONS:  atorvastatin 40 milliGRAM(s) Oral at bedtime  cefTRIAXone   IVPB 1000 milliGRAM(s) IV Intermittent every 24 hours  insulin glargine Injectable (LANTUS) 24 Unit(s) SubCutaneous at bedtime  insulin lispro (ADMELOG) corrective regimen sliding scale   SubCutaneous three times a day before meals  insulin lispro (ADMELOG) corrective regimen sliding scale   SubCutaneous at bedtime  insulin lispro Injectable (ADMELOG) 12 Unit(s) SubCutaneous three times a day before meals      PHYSICAL EXAM:  VITALS: T(C): 36.9 (05-08-24 @ 15:00)  T(F): 98.5 (05-08-24 @ 15:00), Max: 99 (05-07-24 @ 23:00)  HR: 78 (05-08-24 @ 15:00) (78 - 88)  BP: --  RR:  (11 - 20)  SpO2:  (92% - 100%)  Wt(kg): --  GENERAL: Female sitting in chair in NAD having lunch  Abdomen: Soft, nontender, non distended, central adiposity  Extremities: Warm, no edema in all 4 exts  NEURO: A&O X3    LABS:  POCT Blood Glucose.: 176 mg/dL (05-08-24 @ 16:40)  POCT Blood Glucose.: 225 mg/dL (05-08-24 @ 11:58)  POCT Blood Glucose.: 133 mg/dL (05-08-24 @ 07:33)  POCT Blood Glucose.: 167 mg/dL (05-07-24 @ 22:24)  POCT Blood Glucose.: 77 mg/dL (05-07-24 @ 17:00)  POCT Blood Glucose.: 151 mg/dL (05-07-24 @ 11:47)  POCT Blood Glucose.: 167 mg/dL (05-07-24 @ 07:42)  POCT Blood Glucose.: 190 mg/dL (05-06-24 @ 21:35)  POCT Blood Glucose.: 218 mg/dL (05-06-24 @ 16:38)  POCT Blood Glucose.: 200 mg/dL (05-06-24 @ 12:15)  POCT Blood Glucose.: 252 mg/dL (05-06-24 @ 07:59)  POCT Blood Glucose.: 236 mg/dL (05-05-24 @ 21:47)  POCT Blood Glucose.: 367 mg/dL (05-05-24 @ 17:32)                            8.2    3.91  )-----------( 286      ( 08 May 2024 05:25 )             25.1       05-08    140  |  109<H>  |  12  ----------------------------<  128<H>  3.9   |  22  |  0.99    eGFR: 69    Ca    9.3      05-08  Mg     2.0     05-08  Phos  3.6     05-08      Thyroid Function Tests:  05-04 @ 02:59 TSH 2.44 FreeT4 1.1 T3 60 Anti TPO -- Anti Thyroglobulin Ab -- TSI --      A1C with Estimated Average Glucose Result: >15.5 % (05-04-24 @ 02:59)      Estimated Average Glucose: >398 mg/dL (05-04-24 @ 02:59)        05-04 Chol 389<H> Direct LDL -- LDL calculated 274<H> HDL 73 Trig 203<H>

## 2024-05-08 NOTE — PROGRESS NOTE ADULT - SUBJECTIVE AND OBJECTIVE BOX
HPI: 52 y/o female with PMHx significant for diabetes mellitus, not on any anti-thrombotics, presents with severe sudden onset bifrontal headache associated neck stiffness and blurry vision. CTH initially read as negative. LP done to rule out SAH which demonstrated 20048 RBCs and xanthochromia.     Admission Scores  GCS: 15  HH: 2   MF: 1  NIHSS: 0      24 hour Events: Patient admitted to NSCU for SAH protocol.     05/04: Angio: negative for aneurysm  5/4- No acute overnight events. Patient s/p angio- negative for vascular abnormalities. Examination stable.   5/5- No acute overnight events. Patient's examination remains well. MRI brain done.  5/6- No acute overnight events. Patient's examination remains intact, +dysuria  5/7- started ctx for UTI, nauseous this AM, Patient's examination remains intact.  5/8- stable exam. TCD within normal limits. Adjust insulin doses    REVIEW OF SYSTEMS: No CP/SOB, no n/v/d, no numbness/tingling, no weakness, chronic blurry vision    VITALS:   T(C): 37.1 (05-08-24 @ 03:00), Max: 37.2 (05-07-24 @ 23:00)  HR: 82 (05-08-24 @ 05:00) (74 - 92)  BP: --  BP(mean): --  RR: 17 (05-08-24 @ 05:00) (12 - 20)  SpO2: 98% (05-08-24 @ 05:00) (92% - 100%)    acetaminophen     Tablet .. 650 milliGRAM(s) Oral every 6 hours PRN  atorvastatin 40 milliGRAM(s) Oral at bedtime  bisacodyl 5 milliGRAM(s) Oral at bedtime  cefTRIAXone   IVPB 1000 milliGRAM(s) IV Intermittent every 24 hours  chlorhexidine 4% Liquid 1 Application(s) Topical daily  enoxaparin Injectable 40 milliGRAM(s) SubCutaneous <User Schedule>  hydrALAZINE Injectable 5 milliGRAM(s) IV Push once  insulin glargine Injectable (LANTUS) 26 Unit(s) SubCutaneous at bedtime  insulin lispro (ADMELOG) corrective regimen sliding scale   SubCutaneous three times a day before meals  insulin lispro (ADMELOG) corrective regimen sliding scale   SubCutaneous at bedtime  insulin lispro Injectable (ADMELOG) 10 Unit(s) SubCutaneous three times a day before meals  niMODipine 60 milliGRAM(s) Oral every 4 hours  oxyCODONE    IR 5 milliGRAM(s) Oral every 4 hours PRN  phenazopyridine 100 milliGRAM(s) Oral every 8 hours  polyethylene glycol 3350 17 Gram(s) Oral every 12 hours  senna 2 Tablet(s) Oral at bedtime        05-07-24 @ 07:01  -  05-08-24 @ 07:00  --------------------------------------------------------  IN: 650 mL / OUT: 1050 mL / NET: -400 mL                          8.2    3.91  )-----------( 286      ( 08 May 2024 05:25 )             25.1   05-08    140  |  109<H>  |  12  ----------------------------<  128<H>  3.9   |  22  |  0.99      EXAMINATION:  PHYSICAL EXAM:    Constitutional: sitting up in bed, comfortable    Neurological: Awake, alert oriented to person, place and time, Following Commands  PERRL, EOMI, No Gaze Preference, Face Symmetrical, Speech Fluent, No dysmetria.  Motor exam: 5/5 throughout, maintains all extremities antigravity                                               Sensation: intact to light touch      Pulmonary: no accessory muscle use, normal chest expansion and WOB   Cardiovascular: S1, S2, Regular rate and rhythm   Gastrointestinal: Soft, Non-tender, Non-distended, LBM 5/7  Extremities: No calf tenderness, mild R foot edema

## 2024-05-08 NOTE — SPEECH LANGUAGE PATHOLOGY EVALUATION - SLP DIAGNOSIS
Patient p/w expressive/receptive, cognitive linguistic deficits marked by Patient p/w expressive/receptive, cognitive linguistic deficits marked by impaired abstract reasoning, word finding deficits as described by the patient, latency/paucity in responses, and problem solving deficits. Additional intervention warranted to determine level of deficit vs other.

## 2024-05-08 NOTE — PROGRESS NOTE ADULT - PROBLEM SELECTOR PLAN 1
-Test BG ac and hs. Q6H while NPO  - Change Lantus to 24 units nightly   - Change Admelog to 12 units with meals. Had BG of 77 with Admelog 14 but pt was not eating for fear of hypoglycemia. Might need more if she starts eating more consistently  - Awaiting for antibodies to r/o RIYA. - Inpatient glucose goal 140-180 in the ICU setting   -Will follow  - Discharge planning:   Needs to c/w basal bolus insulin since pt was on DKA. Doses TBD  Make sure pt has Rx for all DM supplies and insulin> send RX to [facility] for Novolog insulin pen. If not covered by insurance replace it with any other rapid acting analog  Can follow at endo practice. [address] [facility] [address]. Phone . Call for apt closer to discharge  Needs Optho f/u as out pt

## 2024-05-09 LAB
A1C WITH ESTIMATED AVERAGE GLUCOSE RESULT: >15.5 % — HIGH (ref 4–5.6)
ANION GAP SERPL CALC-SCNC: 8 MMOL/L — SIGNIFICANT CHANGE UP (ref 5–17)
BUN SERPL-MCNC: 12 MG/DL — SIGNIFICANT CHANGE UP (ref 7–23)
CALCIUM SERPL-MCNC: 9.2 MG/DL — SIGNIFICANT CHANGE UP (ref 8.4–10.5)
CHLORIDE SERPL-SCNC: 108 MMOL/L — SIGNIFICANT CHANGE UP (ref 96–108)
CO2 SERPL-SCNC: 23 MMOL/L — SIGNIFICANT CHANGE UP (ref 22–31)
CREAT SERPL-MCNC: 0.83 MG/DL — SIGNIFICANT CHANGE UP (ref 0.5–1.3)
EGFR: 85 ML/MIN/1.73M2 — SIGNIFICANT CHANGE UP
ESTIMATED AVERAGE GLUCOSE: >398 MG/DL — HIGH (ref 68–114)
GLUCOSE BLDC GLUCOMTR-MCNC: 106 MG/DL — HIGH (ref 70–99)
GLUCOSE BLDC GLUCOMTR-MCNC: 125 MG/DL — HIGH (ref 70–99)
GLUCOSE BLDC GLUCOMTR-MCNC: 140 MG/DL — HIGH (ref 70–99)
GLUCOSE BLDC GLUCOMTR-MCNC: 86 MG/DL — SIGNIFICANT CHANGE UP (ref 70–99)
GLUCOSE SERPL-MCNC: 148 MG/DL — HIGH (ref 70–99)
HCT VFR BLD CALC: 23.6 % — LOW (ref 34.5–45)
HGB BLD-MCNC: 8 G/DL — LOW (ref 11.5–15.5)
MAGNESIUM SERPL-MCNC: 1.9 MG/DL — SIGNIFICANT CHANGE UP (ref 1.6–2.6)
MCHC RBC-ENTMCNC: 29.9 PG — SIGNIFICANT CHANGE UP (ref 27–34)
MCHC RBC-ENTMCNC: 33.9 GM/DL — SIGNIFICANT CHANGE UP (ref 32–36)
MCV RBC AUTO: 88.1 FL — SIGNIFICANT CHANGE UP (ref 80–100)
NRBC # BLD: 0 /100 WBCS — SIGNIFICANT CHANGE UP (ref 0–0)
PHOSPHATE SERPL-MCNC: 3.3 MG/DL — SIGNIFICANT CHANGE UP (ref 2.5–4.5)
PLATELET # BLD AUTO: 298 K/UL — SIGNIFICANT CHANGE UP (ref 150–400)
POTASSIUM SERPL-MCNC: 3.8 MMOL/L — SIGNIFICANT CHANGE UP (ref 3.5–5.3)
POTASSIUM SERPL-SCNC: 3.8 MMOL/L — SIGNIFICANT CHANGE UP (ref 3.5–5.3)
RBC # BLD: 2.68 M/UL — LOW (ref 3.8–5.2)
RBC # FLD: 13.8 % — SIGNIFICANT CHANGE UP (ref 10.3–14.5)
SODIUM SERPL-SCNC: 139 MMOL/L — SIGNIFICANT CHANGE UP (ref 135–145)
WBC # BLD: 4.03 K/UL — SIGNIFICANT CHANGE UP (ref 3.8–10.5)
WBC # FLD AUTO: 4.03 K/UL — SIGNIFICANT CHANGE UP (ref 3.8–10.5)

## 2024-05-09 PROCEDURE — 99232 SBSQ HOSP IP/OBS MODERATE 35: CPT

## 2024-05-09 PROCEDURE — 99233 SBSQ HOSP IP/OBS HIGH 50: CPT

## 2024-05-09 RX ORDER — POTASSIUM CHLORIDE 20 MEQ
20 PACKET (EA) ORAL ONCE
Refills: 0 | Status: COMPLETED | OUTPATIENT
Start: 2024-05-09 | End: 2024-05-09

## 2024-05-09 RX ORDER — INSULIN GLARGINE 100 [IU]/ML
20 INJECTION, SOLUTION SUBCUTANEOUS AT BEDTIME
Refills: 0 | Status: DISCONTINUED | OUTPATIENT
Start: 2024-05-09 | End: 2024-05-13

## 2024-05-09 RX ORDER — SODIUM CHLORIDE 9 MG/ML
500 INJECTION INTRAMUSCULAR; INTRAVENOUS; SUBCUTANEOUS ONCE
Refills: 0 | Status: COMPLETED | OUTPATIENT
Start: 2024-05-09 | End: 2024-05-09

## 2024-05-09 RX ORDER — MAGNESIUM SULFATE 500 MG/ML
1 VIAL (ML) INJECTION ONCE
Refills: 0 | Status: COMPLETED | OUTPATIENT
Start: 2024-05-09 | End: 2024-05-09

## 2024-05-09 RX ADMIN — Medication 12 UNIT(S): at 08:54

## 2024-05-09 RX ADMIN — INSULIN GLARGINE 20 UNIT(S): 100 INJECTION, SOLUTION SUBCUTANEOUS at 21:58

## 2024-05-09 RX ADMIN — SODIUM CHLORIDE 500 MILLILITER(S): 9 INJECTION INTRAMUSCULAR; INTRAVENOUS; SUBCUTANEOUS at 10:31

## 2024-05-09 RX ADMIN — NIMODIPINE 60 MILLIGRAM(S): 60 SOLUTION ORAL at 21:53

## 2024-05-09 RX ADMIN — NIMODIPINE 60 MILLIGRAM(S): 60 SOLUTION ORAL at 17:47

## 2024-05-09 RX ADMIN — Medication 20 MILLIEQUIVALENT(S): at 04:47

## 2024-05-09 RX ADMIN — ATORVASTATIN CALCIUM 40 MILLIGRAM(S): 80 TABLET, FILM COATED ORAL at 21:53

## 2024-05-09 RX ADMIN — CEFTRIAXONE 100 MILLIGRAM(S): 500 INJECTION, POWDER, FOR SOLUTION INTRAMUSCULAR; INTRAVENOUS at 05:21

## 2024-05-09 RX ADMIN — NIMODIPINE 60 MILLIGRAM(S): 60 SOLUTION ORAL at 05:13

## 2024-05-09 RX ADMIN — NIMODIPINE 60 MILLIGRAM(S): 60 SOLUTION ORAL at 02:26

## 2024-05-09 RX ADMIN — NIMODIPINE 60 MILLIGRAM(S): 60 SOLUTION ORAL at 14:00

## 2024-05-09 RX ADMIN — POLYETHYLENE GLYCOL 3350 17 GRAM(S): 17 POWDER, FOR SOLUTION ORAL at 17:49

## 2024-05-09 RX ADMIN — NIMODIPINE 60 MILLIGRAM(S): 60 SOLUTION ORAL at 10:30

## 2024-05-09 RX ADMIN — ENOXAPARIN SODIUM 40 MILLIGRAM(S): 100 INJECTION SUBCUTANEOUS at 17:50

## 2024-05-09 RX ADMIN — Medication 12 UNIT(S): at 16:30

## 2024-05-09 RX ADMIN — Medication 102 GRAM(S): at 04:46

## 2024-05-09 RX ADMIN — Medication 12 UNIT(S): at 12:47

## 2024-05-09 NOTE — PROGRESS NOTE ADULT - ASSESSMENT
53F w/h/o uncontrolled T2DM (A1C >15%) on basal/bolus insulin therapy with poor adherence. DM c/b CAD/MI/CVA. Also HTN/HLD. Here with severe sudden-onset bifrontal HA with associated neck stiffness/blurry vision >  admitted with SAH/DKA (AG 20, bicarb 18, BHB 7.3)/UTI. Endocrinology was consulted for management of diabetes mellitus.   Tolerating POs, eats full meals.   BGs in 100s today with fasting  tightly controlled. will decrease Lantus preventively

## 2024-05-09 NOTE — PROGRESS NOTE ADULT - ASSESSMENT
ASSESSMENT/PLAN: HH2 mF 1 subarachnoid hemorrhage, negative angiogram    NEURO:   #SAH angio negative  nimodipine 60mg q4h  #Pain: PRN analgesics  #Activity: PT/OT    PULM:  Incentive spirometry  Maintain SpO2 >94%    CV: TTE- EF 63%, normal atria, normal LV function   SBP goal  mmHg   Atorvastatin 40 mg QD  L Radial A line     RENAL:  Maintain normonatremia, normovolemia  Monitor strict I/Os    GI:  Diet: CCD diet  GI prophylaxis [x] not indicated   Bowel regimen [x] miralax [x] senna     ENDO: A1c 15.5  Endocrine following  Goal euglycemia (-180)  Lantus 26 units at bedtime, Lispro 10 units before meals    HEME/ONC:  Monitor H/H  VTE prophylaxis: SCDs  Lovenox 40mg QD    ID:  Monitor WBC and fever curve  CTX 1g q12 x3d for UTI

## 2024-05-09 NOTE — PROGRESS NOTE ADULT - PROBLEM SELECTOR PLAN 1
-Test BG ac and hs. Q6H while NPO  - Decrease Lantus to 20 units nightly   - continue Admelog to 12 units with meals. Hold if NPO   - Awaiting for antibodies to r/o RIYA.   - Continue low Admelog correction scale ACTID and at HS  - please keep hypoglycemia protocol in place    - Discharge planning:   Needs to c/w basal bolus insulin since pt was on DKA. Doses TBD  Make sure pt has Rx for all DM supplies and insulin> send RX to [facility] for Novolog insulin pen. If not covered by insurance replace it with any other rapid acting analog  Can follow at endo practice. [address] [facility] [address]. Phone . Call for apt closer to discharge  Needs Optho f/u as out pt

## 2024-05-09 NOTE — PROGRESS NOTE ADULT - SUBJECTIVE AND OBJECTIVE BOX
Seen earlier today    Chief Complaint: Diabetes Mellitus follow up    INTERVAL HX: Seen in NSCU. " feel better today". Tolerating POs, eats full meals. BGs in 100s today with fasting  tightly controlled       Review of Systems:  General: As above  GI: No nausea, vomiting  Endocrine: no  S&Sx of hypoglycemia    Allergies    No Known Allergies    Intolerances      MEDICATIONS  (STANDING):  atorvastatin 40 milliGRAM(s) Oral at bedtime  bisacodyl 5 milliGRAM(s) Oral at bedtime  enoxaparin Injectable 40 milliGRAM(s) SubCutaneous <User Schedule>  insulin glargine Injectable (LANTUS) 20 Unit(s) SubCutaneous at bedtime  insulin lispro (ADMELOG) corrective regimen sliding scale   SubCutaneous three times a day before meals  insulin lispro (ADMELOG) corrective regimen sliding scale   SubCutaneous at bedtime  insulin lispro Injectable (ADMELOG) 12 Unit(s) SubCutaneous three times a day before meals  niMODipine 60 milliGRAM(s) Oral every 4 hours  polyethylene glycol 3350 17 Gram(s) Oral every 12 hours  senna 2 Tablet(s) Oral at bedtime      atorvastatin   40 milliGRAM(s) Oral (05-08-24 @ 22:24)    insulin glargine Injectable (LANTUS)   24 Unit(s) SubCutaneous (05-08-24 @ 22:25)    insulin lispro Injectable (ADMELOG)   12 Unit(s) SubCutaneous (05-09-24 @ 16:30)   12 Unit(s) SubCutaneous (05-09-24 @ 12:47)   12 Unit(s) SubCutaneous (05-09-24 @ 08:54)        PHYSICAL EXAM:  VITALS: T(C): 36.9 (05-09-24 @ 17:38)  T(F): 98.5 (05-09-24 @ 17:38), Max: 98.8 (05-08-24 @ 23:00)  HR: 87 (05-09-24 @ 17:38) (79 - 88)  BP: 155/97 (05-09-24 @ 17:38) (138/81 - 155/97)  RR:  (15 - 19)  SpO2:  (98% - 100%)  Wt(kg): --  GENERAL: Female sitting in chair,  in NAD  Respiratory: Respirations unlabored   Extremities: Warm, no edema  NEURO: Alert , appropriate     LABS:  POCT Blood Glucose.: 125 mg/dL (05-09-24 @ 16:20)  POCT Blood Glucose.: 140 mg/dL (05-09-24 @ 12:20)  POCT Blood Glucose.: 106 mg/dL (05-09-24 @ 08:29)  POCT Blood Glucose.: 218 mg/dL (05-08-24 @ 21:53)  POCT Blood Glucose.: 176 mg/dL (05-08-24 @ 16:40)  POCT Blood Glucose.: 225 mg/dL (05-08-24 @ 11:58)  POCT Blood Glucose.: 133 mg/dL (05-08-24 @ 07:33)  POCT Blood Glucose.: 167 mg/dL (05-07-24 @ 22:24)  POCT Blood Glucose.: 77 mg/dL (05-07-24 @ 17:00)  POCT Blood Glucose.: 151 mg/dL (05-07-24 @ 11:47)  POCT Blood Glucose.: 167 mg/dL (05-07-24 @ 07:42)  POCT Blood Glucose.: 190 mg/dL (05-06-24 @ 21:35)                          8.0    4.03  )-----------( 298      ( 09 May 2024 01:00 )             23.6     05-09    139  |  108  |  12  ----------------------------<  148<H>  3.8   |  23  |  0.83    Ca    9.2      09 May 2024 01:00  Phos  3.3     05-09  Mg     1.9     05-09      eGFR: 85 mL/min/1.73m2 (09 May 2024 01:00)    05-04 Chol 389<H> Direct LDL -- LDL calculated 274<H> HDL 73 Trig 203<H>  Thyroid Function Tests:  05-04 @ 02:59 TSH 2.44 FreeT4 1.1 T3 60 Anti TPO -- Anti Thyroglobulin Ab -- TSI --      A1C with Estimated Average Glucose Result: >15.5 % (05-06-24 @ 18:28)  A1C with Estimated Average Glucose Result: >15.5 % (05-04-24 @ 02:59)    Estimated Average Glucose: >398 mg/dL (05-06-24 @ 18:28)  Estimated Average Glucose: >398 mg/dL (05-04-24 @ 02:59)        Diet, Consistent Carbohydrate/No Snacks:   Supplement Feeding Modality:  Oral  Probiotic Yogurt/Smoothie Cans or Servings Per Day:  2       Frequency:  Daily (05-09-24 @ 09:29) [Active]

## 2024-05-09 NOTE — PROGRESS NOTE ADULT - SUBJECTIVE AND OBJECTIVE BOX
HPI: 52 y/o female with PMHx significant for diabetes mellitus, not on any anti-thrombotics, presents with severe sudden onset bifrontal headache associated neck stiffness and blurry vision. CTH initially read as negative. LP done to rule out SAH which demonstrated 95920 RBCs and xanthochromia.     Admission Scores  GCS: 15  HH: 2   MF: 1  NIHSS: 0      24 hour Events: PALOMO      EXAMINATION:  Constitutional: sitting up in bed, comfortable    Neurological: Awake, alert oriented to person, place and time, Following Commands  PERRL, EOMI, No Gaze Preference, Face Symmetrical, Speech Fluent, No dysmetria.  Motor exam: 5/5 throughout, maintains all extremities antigravity                                               Sensation: intact to light touch      Pulmonary: no accessory muscle use, normal chest expansion and WOB   Cardiovascular: S1, S2, Regular rate and rhythm   Gastrointestinal: Soft, Non-tender, Non-distended, LBM 5/7  Extremities: No calf tenderness, mild R foot edema      Age: 53y  LOS: 5d    Vital Signs:  T(C): 36.9 (05-09-24 @ 03:00), Max: 37.1 (05-08-24 @ 23:00)  HR: 81 (05-09-24 @ 03:00) (78 - 85)  BP: --  BP(mean): --  RR: 15 (05-09-24 @ 03:00) (11 - 20)  SpO2: 99% (05-09-24 @ 03:00) (95% - 100%)    acetaminophen     Tablet .. 650 milliGRAM(s) Oral every 6 hours PRN  atorvastatin 40 milliGRAM(s) Oral at bedtime  bisacodyl 5 milliGRAM(s) Oral at bedtime  chlorhexidine 4% Liquid 1 Application(s) Topical daily  enoxaparin Injectable 40 milliGRAM(s) SubCutaneous <User Schedule>  insulin glargine Injectable (LANTUS) 24 Unit(s) SubCutaneous at bedtime  insulin lispro (ADMELOG) corrective regimen sliding scale   SubCutaneous three times a day before meals  insulin lispro (ADMELOG) corrective regimen sliding scale   SubCutaneous at bedtime  insulin lispro Injectable (ADMELOG) 12 Unit(s) SubCutaneous three times a day before meals  niMODipine 60 milliGRAM(s) Oral every 4 hours  oxyCODONE    IR 5 milliGRAM(s) Oral every 4 hours PRN  polyethylene glycol 3350 17 Gram(s) Oral every 12 hours  senna 2 Tablet(s) Oral at bedtime        05-07-24 @ 07:01  -  05-08-24 @ 07:00  --------------------------------------------------------  IN: 650 mL / OUT: 1050 mL / NET: -400 mL    05-08-24 @ 07:01  -  05-09-24 @ 06:49  --------------------------------------------------------  IN: 1870 mL / OUT: 2500 mL / NET: -630 mL                          8.0    4.03  )-----------( 298      ( 09 May 2024 01:00 )             23.6     05-09    139  |  108  |  12  ----------------------------<  148<H>  3.8   |  23  |  0.83     HPI: 52 y/o female with PMHx significant for diabetes mellitus, not on any anti-thrombotics, presents with severe sudden onset bifrontal headache associated neck stiffness and blurry vision. CTH initially read as negative. LP done to rule out SAH which demonstrated 34354 RBCs and xanthochromia.     Admission Scores  GCS: 15  HH: 2   MF: 1  NIHSS: 0      24 hour Events: PALOMO      EXAMINATION:  Constitutional: sitting up in bed, comfortable    Neurological: Awake, alert oriented to person, place and time, Following Commands  PERRL, EOMI, No Gaze Preference, Face Symmetrical, Speech Fluent, No dysmetria.  Motor exam: 5/5 throughout, maintains all extremities antigravity                                               Sensation: intact to light touch      Pulmonary: no accessory muscle use, normal chest expansion and WOB   Cardiovascular: S1, S2, Regular rate and rhythm   Gastrointestinal: Soft, Non-tender, Non-distended, LBM 5/7  Extremities: No calf tenderness, mild R foot edema      Age: 53y  LOS: 5d    Vital Signs:  T(C): 36.9 (05-09-24 @ 03:00), Max: 37.1 (05-08-24 @ 23:00)  HR: 81 (05-09-24 @ 03:00) (78 - 85)  BP: --  BP(mean): --  RR: 15 (05-09-24 @ 03:00) (11 - 20)  SpO2: 99% (05-09-24 @ 03:00) (95% - 100%)    acetaminophen     Tablet .. 650 milliGRAM(s) Oral every 6 hours PRN  atorvastatin 40 milliGRAM(s) Oral at bedtime  bisacodyl 5 milliGRAM(s) Oral at bedtime  chlorhexidine 4% Liquid 1 Application(s) Topical daily  enoxaparin Injectable 40 milliGRAM(s) SubCutaneous <User Schedule>  insulin glargine Injectable (LANTUS) 24 Unit(s) SubCutaneous at bedtime  insulin lispro (ADMELOG) corrective regimen sliding scale   SubCutaneous three times a day before meals  insulin lispro (ADMELOG) corrective regimen sliding scale   SubCutaneous at bedtime  insulin lispro Injectable (ADMELOG) 12 Unit(s) SubCutaneous three times a day before meals  niMODipine 60 milliGRAM(s) Oral every 4 hours  oxyCODONE    IR 5 milliGRAM(s) Oral every 4 hours PRN  polyethylene glycol 3350 17 Gram(s) Oral every 12 hours  senna 2 Tablet(s) Oral at bedtime        05-07-24 @ 07:01  -  05-08-24 @ 07:00  --------------------------------------------------------  IN: 650 mL / OUT: 1050 mL / NET: -400 mL    05-08-24 @ 07:01  -  05-09-24 @ 06:49  --------------------------------------------------------  IN: 1870 mL / OUT: 2500 mL / NET: -630 mL                          8.0    4.03  )-----------( 298      ( 09 May 2024 01:00 )             23.6     05-09    139  |  108  |  12  ----------------------------<  148<H>  3.8   |  23  |  0.83      Transcranial doppler exam #1 (5/6/24) 1130am  mean velocity  cm/sec                              Left         Right  DONNY                    51           67  MCA                   73           59  PCA                    32,24       31,28  VERT                   31            48  BA                              41  Official report to follow.  Estrada    Transcranial doppler exam #2 (5/7/24) 11am  mean velocity  cm/sec                              Left         Right  DONNY                    65           78  MCA                   90           70   PCA                    33,42       34,32  Official report to follow.  Estrada    Transcranial doppler exam #2 (5/8/24) 11am  mean velocity  cm/sec                              Left         Right  DONNY                    63           67  MCA                   72           64  PCA                    P2-25       20,27  Official report to follow.  Estrada.

## 2024-05-09 NOTE — PROGRESS NOTE ADULT - ATTENDING COMMENTS
no TCDs today; prior values have been unremarkable.  no EVD in place and no plan for immediate repeat angio.  can transfer out of NSCU when bed available.

## 2024-05-09 NOTE — PROGRESS NOTE ADULT - PROBLEM SELECTOR PLAN 3
LDL goal <55 due to CAD/DM/CVA  Pt  in 5/4/24  Statin as noted above( Atorvastatin 40 mg daily )   Consider low cholesterol diet   Manage per primary team   F/u levels as out pt      Contact via [employer] Teams during business hours  To reach covering provider access AMION via sunrise tools  For Urgent matters/after-hours/weekends/holidays please page endocrine fellow on call   For nonurgent matters please email [email or web address]    Please note that this patient may be followed by different provider tomorrow.  Notify endocrine 24 hours prior to discharge for final recommendations

## 2024-05-09 NOTE — PROGRESS NOTE ADULT - ASSESSMENT
Jessica Wolff  53F, no AC/AP, pmh diabetes, p/w severe sudden-onset bifrontal HA this AM with associated neck stiffness/blurry vision. CTH/CTA read as negative by radiologist but appears to have R perimes SAH (HH1, MF1). LP in ED w/ 72069 RBCs, and xanthochromic. Afebrile, WBC wnl. PLTs wnl, Coags wnl. Exam: intact    s/p 5/4 angiogram- negative    TCDs daily  Keep NSCU until PBD 7   PT: outpt

## 2024-05-09 NOTE — PROGRESS NOTE ADULT - SUBJECTIVE AND OBJECTIVE BOX
Patient seen and examined at bedside.    --Anticoagulation--  enoxaparin Injectable 40 milliGRAM(s) SubCutaneous <User Schedule>    T(C): 37.1 (05-08-24 @ 23:00), Max: 37.1 (05-08-24 @ 03:00)  HR: 79 (05-08-24 @ 23:00) (78 - 85)  BP: --  RR: 16 (05-08-24 @ 23:00) (11 - 20)  SpO2: 98% (05-08-24 @ 23:00) (92% - 100%)  Wt(kg): --    Exam: intact

## 2024-05-10 DIAGNOSIS — I60.9 NONTRAUMATIC SUBARACHNOID HEMORRHAGE, UNSPECIFIED: ICD-10-CM

## 2024-05-10 DIAGNOSIS — Z29.9 ENCOUNTER FOR PROPHYLACTIC MEASURES, UNSPECIFIED: ICD-10-CM

## 2024-05-10 DIAGNOSIS — D64.9 ANEMIA, UNSPECIFIED: ICD-10-CM

## 2024-05-10 LAB
ANION GAP SERPL CALC-SCNC: 10 MMOL/L — SIGNIFICANT CHANGE UP (ref 5–17)
BUN SERPL-MCNC: 13 MG/DL — SIGNIFICANT CHANGE UP (ref 7–23)
CALCIUM SERPL-MCNC: 9.5 MG/DL — SIGNIFICANT CHANGE UP (ref 8.4–10.5)
CHLORIDE SERPL-SCNC: 105 MMOL/L — SIGNIFICANT CHANGE UP (ref 96–108)
CO2 SERPL-SCNC: 23 MMOL/L — SIGNIFICANT CHANGE UP (ref 22–31)
CREAT SERPL-MCNC: 0.85 MG/DL — SIGNIFICANT CHANGE UP (ref 0.5–1.3)
EGFR: 82 ML/MIN/1.73M2 — SIGNIFICANT CHANGE UP
GLUCOSE BLDC GLUCOMTR-MCNC: 121 MG/DL — HIGH (ref 70–99)
GLUCOSE BLDC GLUCOMTR-MCNC: 124 MG/DL — HIGH (ref 70–99)
GLUCOSE BLDC GLUCOMTR-MCNC: 136 MG/DL — HIGH (ref 70–99)
GLUCOSE BLDC GLUCOMTR-MCNC: 246 MG/DL — HIGH (ref 70–99)
GLUCOSE SERPL-MCNC: 224 MG/DL — HIGH (ref 70–99)
HCT VFR BLD CALC: 26.5 % — LOW (ref 34.5–45)
HGB BLD-MCNC: 8.4 G/DL — LOW (ref 11.5–15.5)
MAGNESIUM SERPL-MCNC: 2 MG/DL — SIGNIFICANT CHANGE UP (ref 1.6–2.6)
MCHC RBC-ENTMCNC: 29.2 PG — SIGNIFICANT CHANGE UP (ref 27–34)
MCHC RBC-ENTMCNC: 31.7 GM/DL — LOW (ref 32–36)
MCV RBC AUTO: 92 FL — SIGNIFICANT CHANGE UP (ref 80–100)
NRBC # BLD: 0 /100 WBCS — SIGNIFICANT CHANGE UP (ref 0–0)
PHOSPHATE SERPL-MCNC: 3.5 MG/DL — SIGNIFICANT CHANGE UP (ref 2.5–4.5)
PLATELET # BLD AUTO: 380 K/UL — SIGNIFICANT CHANGE UP (ref 150–400)
POTASSIUM SERPL-MCNC: 3.9 MMOL/L — SIGNIFICANT CHANGE UP (ref 3.5–5.3)
POTASSIUM SERPL-SCNC: 3.9 MMOL/L — SIGNIFICANT CHANGE UP (ref 3.5–5.3)
RBC # BLD: 2.88 M/UL — LOW (ref 3.8–5.2)
RBC # FLD: 14.2 % — SIGNIFICANT CHANGE UP (ref 10.3–14.5)
SODIUM SERPL-SCNC: 138 MMOL/L — SIGNIFICANT CHANGE UP (ref 135–145)
WBC # BLD: 4.51 K/UL — SIGNIFICANT CHANGE UP (ref 3.8–10.5)
WBC # FLD AUTO: 4.51 K/UL — SIGNIFICANT CHANGE UP (ref 3.8–10.5)

## 2024-05-10 PROCEDURE — 99223 1ST HOSP IP/OBS HIGH 75: CPT

## 2024-05-10 PROCEDURE — 99232 SBSQ HOSP IP/OBS MODERATE 35: CPT

## 2024-05-10 RX ORDER — INSULIN LISPRO 100/ML
10 VIAL (ML) SUBCUTANEOUS
Refills: 0 | Status: DISCONTINUED | OUTPATIENT
Start: 2024-05-10 | End: 2024-05-14

## 2024-05-10 RX ORDER — INSULIN LISPRO 100/ML
10 VIAL (ML) SUBCUTANEOUS
Qty: 1 | Refills: 0
Start: 2024-05-10

## 2024-05-10 RX ORDER — ISOPROPYL ALCOHOL, BENZOCAINE .7; .06 ML/ML; ML/ML
0 SWAB TOPICAL
Qty: 100 | Refills: 1
Start: 2024-05-10

## 2024-05-10 RX ORDER — ONDANSETRON 8 MG/1
4 TABLET, FILM COATED ORAL EVERY 8 HOURS
Refills: 0 | Status: DISCONTINUED | OUTPATIENT
Start: 2024-05-10 | End: 2024-05-14

## 2024-05-10 RX ORDER — INSULIN LISPRO 100/ML
12 VIAL (ML) SUBCUTANEOUS
Refills: 0 | Status: DISCONTINUED | OUTPATIENT
Start: 2024-05-11 | End: 2024-05-14

## 2024-05-10 RX ORDER — INSULIN LISPRO 100/ML
12 VIAL (ML) SUBCUTANEOUS
Refills: 0 | Status: DISCONTINUED | OUTPATIENT
Start: 2024-05-11 | End: 2024-05-11

## 2024-05-10 RX ORDER — INSULIN GLARGINE 100 [IU]/ML
0 INJECTION, SOLUTION SUBCUTANEOUS
Qty: 5 | Refills: 0
Start: 2024-05-10

## 2024-05-10 RX ORDER — INSULIN GLARGINE 100 [IU]/ML
20 INJECTION, SOLUTION SUBCUTANEOUS
Qty: 1 | Refills: 0
Start: 2024-05-10

## 2024-05-10 RX ORDER — INSULIN ASPART 100 [IU]/ML
10 INJECTION, SOLUTION SUBCUTANEOUS
Qty: 1 | Refills: 0
Start: 2024-05-10

## 2024-05-10 RX ADMIN — ENOXAPARIN SODIUM 40 MILLIGRAM(S): 100 INJECTION SUBCUTANEOUS at 17:58

## 2024-05-10 RX ADMIN — Medication 12 UNIT(S): at 07:00

## 2024-05-10 RX ADMIN — NIMODIPINE 60 MILLIGRAM(S): 60 SOLUTION ORAL at 21:28

## 2024-05-10 RX ADMIN — NIMODIPINE 60 MILLIGRAM(S): 60 SOLUTION ORAL at 17:58

## 2024-05-10 RX ADMIN — POLYETHYLENE GLYCOL 3350 17 GRAM(S): 17 POWDER, FOR SOLUTION ORAL at 17:58

## 2024-05-10 RX ADMIN — NIMODIPINE 60 MILLIGRAM(S): 60 SOLUTION ORAL at 15:04

## 2024-05-10 RX ADMIN — NIMODIPINE 60 MILLIGRAM(S): 60 SOLUTION ORAL at 05:09

## 2024-05-10 RX ADMIN — SENNA PLUS 2 TABLET(S): 8.6 TABLET ORAL at 21:29

## 2024-05-10 RX ADMIN — Medication 12 UNIT(S): at 11:52

## 2024-05-10 RX ADMIN — Medication 5 MILLIGRAM(S): at 21:29

## 2024-05-10 RX ADMIN — NIMODIPINE 60 MILLIGRAM(S): 60 SOLUTION ORAL at 11:53

## 2024-05-10 RX ADMIN — Medication 10 UNIT(S): at 17:00

## 2024-05-10 RX ADMIN — ATORVASTATIN CALCIUM 40 MILLIGRAM(S): 80 TABLET, FILM COATED ORAL at 21:29

## 2024-05-10 RX ADMIN — INSULIN GLARGINE 20 UNIT(S): 100 INJECTION, SOLUTION SUBCUTANEOUS at 21:28

## 2024-05-10 NOTE — CONSULT NOTE ADULT - PROBLEM SELECTOR RECOMMENDATION 4
Baseline appears to be 11-12  Stable between 8-9, likely dilutional component from IVF but c/t monitor  Outpatient f/u with PCP

## 2024-05-10 NOTE — PROGRESS NOTE ADULT - SUBJECTIVE AND OBJECTIVE BOX
Seen earlier today     Chief Complaint: Diabetes Mellitus follow up    INTERVAL HX: Tolerating POs, eats full meals . Denies having outside food or eating snacks between meals. Last 24 hour BGs in 100s except tightly controlled BG at HS (86) last night. Discussed the importance of BG control to prevent DM complications and she will need to get insulin X4/day at home and she verbalized understanding and said that she will check BGs at home and give herself insulin injections.  Later, found that she has poor vision which could be a barrier for patient to give insulin injection and monitoring BGs.    Review of Systems:  General: As above  GI: No nausea, vomiting  Endocrine: no  S&Sx of hypoglycemia    Allergies    No Known Allergies    Intolerances      MEDICATIONS  (STANDING):  atorvastatin 40 milliGRAM(s) Oral at bedtime  bisacodyl 5 milliGRAM(s) Oral at bedtime  enoxaparin Injectable 40 milliGRAM(s) SubCutaneous <User Schedule>  insulin glargine Injectable (LANTUS) 20 Unit(s) SubCutaneous at bedtime  insulin lispro (ADMELOG) corrective regimen sliding scale   SubCutaneous three times a day before meals  insulin lispro (ADMELOG) corrective regimen sliding scale   SubCutaneous at bedtime  insulin lispro Injectable (ADMELOG) 10 Unit(s) SubCutaneous before dinner  niMODipine 60 milliGRAM(s) Oral every 4 hours  polyethylene glycol 3350 17 Gram(s) Oral every 12 hours  senna 2 Tablet(s) Oral at bedtime      atorvastatin   40 milliGRAM(s) Oral (05-09-24 @ 21:53)    insulin glargine Injectable (LANTUS)   20 Unit(s) SubCutaneous (05-09-24 @ 21:58)    insulin lispro Injectable (ADMELOG)   12 Unit(s) SubCutaneous (05-10-24 @ 11:52)   12 Unit(s) SubCutaneous (05-10-24 @ 07:00)        PHYSICAL EXAM:  VITALS: T(C): 37.1 (05-10-24 @ 13:09)  T(F): 98.8 (05-10-24 @ 13:09), Max: 98.8 (05-10-24 @ 13:09)  HR: 94 (05-10-24 @ 13:09) (79 - 94)  BP: 127/75 (05-10-24 @ 13:09) (110/69 - 152/85)  RR:  (18 - 20)  SpO2:  (97% - 99%)  Wt(kg): --  GENERAL: Female sitting in bed, in NAD  Respiratory: Respirations unlabored   Extremities: Warm, no edema  NEURO: Alert , appropriate     LABS:  POCT Blood Glucose.: 124 mg/dL (05-10-24 @ 16:53)  POCT Blood Glucose.: 121 mg/dL (05-10-24 @ 11:46)  POCT Blood Glucose.: 136 mg/dL (05-10-24 @ 07:25)  POCT Blood Glucose.: 86 mg/dL (05-09-24 @ 21:52)  POCT Blood Glucose.: 125 mg/dL (05-09-24 @ 16:20)  POCT Blood Glucose.: 140 mg/dL (05-09-24 @ 12:20)  POCT Blood Glucose.: 106 mg/dL (05-09-24 @ 08:29)  POCT Blood Glucose.: 218 mg/dL (05-08-24 @ 21:53)  POCT Blood Glucose.: 176 mg/dL (05-08-24 @ 16:40)  POCT Blood Glucose.: 225 mg/dL (05-08-24 @ 11:58)  POCT Blood Glucose.: 133 mg/dL (05-08-24 @ 07:33)  POCT Blood Glucose.: 167 mg/dL (05-07-24 @ 22:24)                          8.4    4.51  )-----------( 380      ( 10 May 2024 09:31 )             26.5     05-10    138  |  105  |  13  ----------------------------<  224<H>  3.9   |  23  |  0.85    Ca    9.5      10 May 2024 09:31  Phos  3.5     05-10  Mg     2.0     05-10      eGFR: 82 mL/min/1.73m2 (10 May 2024 09:31)    05-04 Chol 389<H> Direct LDL -- LDL calculated 274<H> HDL 73 Trig 203<H>  Thyroid Function Tests:  05-04 @ 02:59 TSH 2.44 FreeT4 1.1 T3 60 Anti TPO -- Anti Thyroglobulin Ab -- TSI --      A1C with Estimated Average Glucose Result: >15.5 % (05-06-24 @ 18:28)  A1C with Estimated Average Glucose Result: >15.5 % (05-04-24 @ 02:59)    Estimated Average Glucose: >398 mg/dL (05-06-24 @ 18:28)  Estimated Average Glucose: >398 mg/dL (05-04-24 @ 02:59)        Diet, Consistent Carbohydrate/No Snacks:   Supplement Feeding Modality:  Oral  Probiotic Yogurt/Smoothie Cans or Servings Per Day:  2       Frequency:  Daily (05-09-24 @ 09:29) [Active]

## 2024-05-10 NOTE — CONSULT NOTE ADULT - PROBLEM SELECTOR RECOMMENDATION 5
A guidewire was introduced. on lovenox  Patient plans to make appointment with PCP after discharge   BM 5/10 per patient   LE duplex neg 5/4  TTE this admission with no acute abnormalities. Some anasarca from IVF for DKA

## 2024-05-10 NOTE — CONSULT NOTE ADULT - PROBLEM SELECTOR RECOMMENDATION 2
A1c 15. with retinopathy, neuropathy. s/p insulin gtt for DKA   Despite DM being diagnosed over 10 years ago, sounds like patient had been lost to f/u  Was on novolin outpatient   Endocrine following, recs appreciated. on 20u lantus, 12 premeal and ISS  Outpatient f/u with optho for blurry vision - present for over 1 year and has been told this is d/t DM  Outpatient f/u with podiatry. Offered gabapentin for neuropathy symptoms but patient says it is not too bothersome and would prefer to avoid more medications

## 2024-05-10 NOTE — PROGRESS NOTE ADULT - SUBJECTIVE AND OBJECTIVE BOX
SUBJECTIVE:   No new complaints Blurry vision stable over past months to years   OVERNIGHT EVENTS: none    Vital Signs Last 24 Hrs  T(C): 37.1 (10 May 2024 13:09), Max: 37.1 (10 May 2024 01:15)  T(F): 98.8 (10 May 2024 13:09), Max: 98.8 (10 May 2024 13:09)  HR: 94 (10 May 2024 13:09) (79 - 94)  BP: 127/75 (10 May 2024 13:09) (110/69 - 155/97)  BP(mean): --  RR: 18 (10 May 2024 13:09) (18 - 20)  SpO2: 99% (10 May 2024 13:09) (97% - 100%)    Parameters below as of 10 May 2024 13:09  Patient On (Oxygen Delivery Method): room air        PHYSICAL EXAM:    Constitutional: No Acute Distress     Neurological: Awake alert Ox3,Speech clear  Following Commands, Moving all Extremities 5/5 No drift. BERNARD EOMI Can finger count bilaterally. Unable to read either eye      Pulmonary: Clear to Auscultation,     Cardiovascular: S1, S2, Regular rate and rhythm     Gastrointestinal: Soft, Non-tender, Non-distended     Extremities: No calf tenderness         LABS:                        8.4    4.51  )-----------( 380      ( 10 May 2024 09:31 )             26.5    05-10    138  |  105  |  13  ----------------------------<  224<H>  3.9   |  23  |  0.85    Ca    9.5      10 May 2024 09:31  Phos  3.5     05-10  Mg     2.0     05-10            IMAGING:         MEDICATIONS:    acetaminophen     Tablet .. 650 milliGRAM(s) Oral every 6 hours PRN Temp greater or equal to 38.5C (101.3F), Mild Pain (1 - 3)  ondansetron Injectable 4 milliGRAM(s) IV Push every 8 hours PRN Nausea and/or Vomiting  oxyCODONE    IR 5 milliGRAM(s) Oral every 4 hours PRN Moderate Pain (4 - 6)  niMODipine 60 milliGRAM(s) Oral every 4 hours  bisacodyl 5 milliGRAM(s) Oral at bedtime  polyethylene glycol 3350 17 Gram(s) Oral every 12 hours  senna 2 Tablet(s) Oral at bedtime  atorvastatin 40 milliGRAM(s) Oral at bedtime  enoxaparin Injectable 40 milliGRAM(s) SubCutaneous <User Schedule>  insulin glargine Injectable (LANTUS) 20 Unit(s) SubCutaneous at bedtime  insulin lispro (ADMELOG) corrective regimen sliding scale   SubCutaneous at bedtime  insulin lispro (ADMELOG) corrective regimen sliding scale   SubCutaneous three times a day before meals  insulin lispro Injectable (ADMELOG) 12 Unit(s) SubCutaneous three times a day before meals      DIET:

## 2024-05-10 NOTE — CONSULT NOTE ADULT - SUBJECTIVE AND OBJECTIVE BOX
[facility] Division of Hospital Medicine  Dot Goodman DO  [employer]    HPI:  Jessica Wolff  53F, no AC/AP, pmh diabetes, p/w severe sudden-onset bifrontal HA this AM with associated neck stiffness/blurry vision. CTH/CTA read as negative by radiologist but appears to have R perimes SAH (HH1, MF1). LP in ED w/ 42654 RBCs, and xanthochromic. Afebrile, WBC wnl. PLTs wnl, Coags wnl.   (04 May 2024 01:26)      PAST MEDICAL & SURGICAL HISTORY:  No significant past surgical history          Review of Systems:   CONSTITUTIONAL: No fever, chills  HEENT: No sore throat, vision changes  RESPIRATORY: No cough, wheezing, shortness of breath  CARDIOVASCULAR: No chest pain, palpitations, leg edema  GASTROINTESTINAL: No abdominal pain, nausea, vomiting, diarrhea or constipation. No melena or hematochezia.  GENITOURINARY: No dysuria, frequency, hematuria  NEUROLOGICAL: No headaches, memory loss, loss of strength, numbness, or tremors  SKIN: No itching, burning, rashes, or lesions   MUSCULOSKELETAL: No joint pain or swelling; No muscle, back, or extremity pain  PSYCHIATRIC: No depression, anxiety  HEME/LYMPH: No easy bruising, or bleeding gums      Allergies    No Known Allergies    Intolerances        Social History:     FAMILY HISTORY:  Family history of diabetes mellitus type I (Mother)        MEDICATIONS  (STANDING):  atorvastatin 40 milliGRAM(s) Oral at bedtime  bisacodyl 5 milliGRAM(s) Oral at bedtime  enoxaparin Injectable 40 milliGRAM(s) SubCutaneous <User Schedule>  insulin glargine Injectable (LANTUS) 20 Unit(s) SubCutaneous at bedtime  insulin lispro (ADMELOG) corrective regimen sliding scale   SubCutaneous three times a day before meals  insulin lispro (ADMELOG) corrective regimen sliding scale   SubCutaneous at bedtime  insulin lispro Injectable (ADMELOG) 12 Unit(s) SubCutaneous three times a day before meals  niMODipine 60 milliGRAM(s) Oral every 4 hours  polyethylene glycol 3350 17 Gram(s) Oral every 12 hours  senna 2 Tablet(s) Oral at bedtime    MEDICATIONS  (PRN):  acetaminophen     Tablet .. 650 milliGRAM(s) Oral every 6 hours PRN Temp greater or equal to 38.5C (101.3F), Mild Pain (1 - 3)  ondansetron Injectable 4 milliGRAM(s) IV Push every 8 hours PRN Nausea and/or Vomiting  oxyCODONE    IR 5 milliGRAM(s) Oral every 4 hours PRN Moderate Pain (4 - 6)        CAPILLARY BLOOD GLUCOSE      POCT Blood Glucose.: 121 mg/dL (10 May 2024 11:46)  POCT Blood Glucose.: 136 mg/dL (10 May 2024 07:25)  POCT Blood Glucose.: 86 mg/dL (09 May 2024 21:52)  POCT Blood Glucose.: 125 mg/dL (09 May 2024 16:20)  POCT Blood Glucose.: 140 mg/dL (09 May 2024 12:20)    I&O's Summary    09 May 2024 07:01  -  10 May 2024 07:00  --------------------------------------------------------  IN: 350 mL / OUT: 0 mL / NET: 350 mL    10 May 2024 07:01  -  10 May 2024 11:55  --------------------------------------------------------  IN: 360 mL / OUT: 0 mL / NET: 360 mL        Physical Exam:  Vital Signs Last 24 Hrs  T(C): 36.9 (10 May 2024 08:36), Max: 37.1 (10 May 2024 01:15)  T(F): 98.4 (10 May 2024 08:36), Max: 98.7 (10 May 2024 01:15)  HR: 90 (10 May 2024 08:36) (79 - 90)  BP: 148/86 (10 May 2024 08:36) (110/69 - 155/97)  BP(mean): --  RR: 20 (10 May 2024 08:36) (18 - 20)  SpO2: 99% (10 May 2024 08:36) (97% - 100%)    Parameters below as of 10 May 2024 08:36  Patient On (Oxygen Delivery Method): room air        CONSTITUTIONAL: NAD, well-developed, well-groomed  NECK: Supple, no palpable masses; no thyromegaly  RESPIRATORY: Normal respiratory effort; lungs are clear to auscultation bilaterally  CARDIOVASCULAR: normal S1 and S2, no murmur/rub/gallop; No lower extremity edema  ABDOMEN: Nontender to palpation, normoactive bowel sounds, no rebound/guarding; No hepatosplenomegaly  MUSCULOSKELETAL:  no clubbing or cyanosis of digits; no joint swelling or tenderness to palpation  PSYCH: A+O to person, place, and time; affect appropriate  NEUROLOGY: CN 2-12 are intact and symmetric; no gross sensory deficits   SKIN: No rashes; no palpable lesions    LABS:                        8.4    4.51  )-----------( 380      ( 10 May 2024 09:31 )             26.5     05-10    138  |  105  |  13  ----------------------------<  224<H>  3.9   |  23  |  0.85    Ca    9.5      10 May 2024 09:31  Phos  3.5     05-10  Mg     2.0     05-10            Urinalysis Basic - ( 10 May 2024 09:31 )    Color: x / Appearance: x / SG: x / pH: x  Gluc: 224 mg/dL / Ketone: x  / Bili: x / Urobili: x   Blood: x / Protein: x / Nitrite: x   Leuk Esterase: x / RBC: x / WBC x   Sq Epi: x / Non Sq Epi: x / Bacteria: x          RADIOLOGY & ADDITIONAL TESTS:  Results Reviewed:   Imaging Personally Reviewed:  Electrocardiogram Personally Reviewed:    COORDINATION OF CARE:  Care Discussed with Consultants/Other Providers [Y/N]:  Prior or Outpatient Records Reviewed [Y/N]: [facility] Division of Hospital Medicine  Dot Goodman DO  [employer]    HPI:  Jessica Wolff  53F, no AC/AP, pmh diabetes, p/w severe sudden-onset bifrontal HA this AM with associated neck stiffness/blurry vision. CTH/CTA read as negative by radiologist but appears to have R perimes SAH (HH1, MF1). LP in ED w/ 87993 RBCs, and xanthochromic. Afebrile, WBC wnl. PLTs wnl, Coags wnl.   (04 May 2024 01:26)    Patient was found to have SAH on imaging and admitted to [facility]U for protocol. Went for angio 5/4 which was negative. Course was c/b DKA for which endocrine is following, and UTI now s/p x3 days ceftriaxone (5/7-). Patient was seen on 4 herrera. Endorses blurry vision for over 1 year, HA has improved. Has also been having long-time peripheral neuropathy. Though diabetes was diagnosed > 10 years ago per endocrine note suspect overall poor follow up - patient endorses seeing PCP for the first time recently, saw optho a few months ago and says the pharmacy does not dispense her many insulin pens. Likely missing doses. Also endorses BG running 300-400 at home. Only currently on short acting insulin, no other home medications. Never seen an endocrinologist. Says that she has a previous ddx of diabetic retinopathy. Overall feels swollen from the IVF in the ICU, no CP, SOB.     PAST MEDICAL & SURGICAL HISTORY:  No significant past surgical history          Review of Systems:   CONSTITUTIONAL: No fever, chills  HEENT: No sore throat, vision changes  RESPIRATORY: No cough, wheezing, shortness of breath  CARDIOVASCULAR: No chest pain, palpitations, leg edema  GASTROINTESTINAL: No abdominal pain, nausea, vomiting, diarrhea or constipation. No melena or hematochezia.  GENITOURINARY: No dysuria, frequency, hematuria  NEUROLOGICAL: No headaches, memory loss, loss of strength, numbness, or tremors  SKIN: No itching, burning, rashes, or lesions   MUSCULOSKELETAL: No joint pain or swelling; No muscle, back, or extremity pain  PSYCHIATRIC: No depression, anxiety  HEME/LYMPH: No easy bruising, or bleeding gums      Allergies    No Known Allergies    Intolerances        Social History: denies smoking hx, alcohol use     FAMILY HISTORY:  Family history of diabetes mellitus type I (Mother)        MEDICATIONS  (STANDING):  atorvastatin 40 milliGRAM(s) Oral at bedtime  bisacodyl 5 milliGRAM(s) Oral at bedtime  enoxaparin Injectable 40 milliGRAM(s) SubCutaneous <User Schedule>  insulin glargine Injectable (LANTUS) 20 Unit(s) SubCutaneous at bedtime  insulin lispro (ADMELOG) corrective regimen sliding scale   SubCutaneous three times a day before meals  insulin lispro (ADMELOG) corrective regimen sliding scale   SubCutaneous at bedtime  insulin lispro Injectable (ADMELOG) 12 Unit(s) SubCutaneous three times a day before meals  niMODipine 60 milliGRAM(s) Oral every 4 hours  polyethylene glycol 3350 17 Gram(s) Oral every 12 hours  senna 2 Tablet(s) Oral at bedtime    MEDICATIONS  (PRN):  acetaminophen     Tablet .. 650 milliGRAM(s) Oral every 6 hours PRN Temp greater or equal to 38.5C (101.3F), Mild Pain (1 - 3)  ondansetron Injectable 4 milliGRAM(s) IV Push every 8 hours PRN Nausea and/or Vomiting  oxyCODONE    IR 5 milliGRAM(s) Oral every 4 hours PRN Moderate Pain (4 - 6)        CAPILLARY BLOOD GLUCOSE      POCT Blood Glucose.: 121 mg/dL (10 May 2024 11:46)  POCT Blood Glucose.: 136 mg/dL (10 May 2024 07:25)  POCT Blood Glucose.: 86 mg/dL (09 May 2024 21:52)  POCT Blood Glucose.: 125 mg/dL (09 May 2024 16:20)  POCT Blood Glucose.: 140 mg/dL (09 May 2024 12:20)    I&O's Summary    09 May 2024 07:01  -  10 May 2024 07:00  --------------------------------------------------------  IN: 350 mL / OUT: 0 mL / NET: 350 mL    10 May 2024 07:01  -  10 May 2024 11:55  --------------------------------------------------------  IN: 360 mL / OUT: 0 mL / NET: 360 mL        Physical Exam:  Vital Signs Last 24 Hrs  T(C): 36.9 (10 May 2024 08:36), Max: 37.1 (10 May 2024 01:15)  T(F): 98.4 (10 May 2024 08:36), Max: 98.7 (10 May 2024 01:15)  HR: 90 (10 May 2024 08:36) (79 - 90)  BP: 148/86 (10 May 2024 08:36) (110/69 - 155/97)  BP(mean): --  RR: 20 (10 May 2024 08:36) (18 - 20)  SpO2: 99% (10 May 2024 08:36) (97% - 100%)    Parameters below as of 10 May 2024 08:36  Patient On (Oxygen Delivery Method): room air        CONSTITUTIONAL: NAD, well-developed, well-groomed  RESPIRATORY: Normal respiratory effort; lungs are clear to auscultation bilaterally  CARDIOVASCULAR: regular, normal S1 and S2, trace lower extremity edema  ABDOMEN: Nontender to palpation, normoactive bowel sounds  MUSCULOSKELETAL: no clubbing or cyanosis of digits; no joint swelling or tenderness to palpation  PSYCH: A+O to person, place, and time; affect appropriate  NEUROLOGY: strength intact, no gross sensory deficits   SKIN: No rashes; no palpable lesions    LABS:                        8.4    4.51  )-----------( 380      ( 10 May 2024 09:31 )             26.5     05-10    138  |  105  |  13  ----------------------------<  224<H>  3.9   |  23  |  0.85    Ca    9.5      10 May 2024 09:31  Phos  3.5     05-10  Mg     2.0     05-10            Urinalysis Basic - ( 10 May 2024 09:31 )    Color: x / Appearance: x / SG: x / pH: x  Gluc: 224 mg/dL / Ketone: x  / Bili: x / Urobili: x   Blood: x / Protein: x / Nitrite: x   Leuk Esterase: x / RBC: x / WBC x   Sq Epi: x / Non Sq Epi: x / Bacteria: x          RADIOLOGY & ADDITIONAL TESTS:  Results Reviewed:   Imaging Personally Reviewed:  Electrocardiogram Personally Reviewed:    COORDINATION OF CARE:  Care Discussed with Consultants/Other Providers [Y/N]: neurosurgery   Prior or Outpatient Records Reviewed [Y/N]:

## 2024-05-10 NOTE — PROGRESS NOTE ADULT - PROBLEM SELECTOR PLAN 1
-Test BG ac and hs. Q6H while NPO  - Decrease Lantus to 20 units nightly   - continue Admelog to 12 units with meals. Hold if NPO   - Awaiting for antibodies to r/o RIYA.   - Continue low Admelog correction scale ACTID and at HS  - please keep hypoglycemia protocol in place    - Discharge planning:   Needs to c/w basal bolus insulin since pt was on DKA. Doses TBD  Make sure pt has Rx for all DM supplies and insulin> send RX to [facility] for Novolog insulin pen. If not covered by insurance replace it with any other rapid acting analog  Can follow at endo practice. [address] Phone . Call for apt closer to discharge  Needs Optho f/u as out pt -Test BG ac and hs. Q6H while NPO  - Decrease Lantus to 20 units nightly   - Adjust Admelog 12-12-10 units with meals. Hold if NPO   - Awaiting for antibodies to r/o IRYA- so far  EBEN negative, Islet cell antibodies negative   - Continue low Admelog correction scale ACTID and at HS  - please keep hypoglycemia protocol in place  - Please teach patient, family and neighbor friend insulin pen use and glucose check     - Discharge planning:   Needs to c/w basal bolus insulin since pt was on DKA. Doses TBD  Patient will need basal and bolus on discharge ( X4/day insulin injection )   She will need supervision or assistance for all injections on her discharge for safety   Please make sure all family and neighbor friends who are planning to assist patient on insulin injections, learn insulin pen use and glucose check   Please arrange home care   Make sure pt has Rx for all DM supplies and insulin> send RX to [facility] for Novolog insulin pen. If not covered by insurance replace it with any other rapid acting analog  Can follow at [facility] practice. [address]. Phone . Email sent on 5/10   Needs Optho f/u as out pt

## 2024-05-10 NOTE — PROGRESS NOTE ADULT - PROBLEM SELECTOR PLAN 3
LDL goal <55 due to CAD/DM/CVA  Pt  in 5/4/24  Statin as noted above( Atorvastatin 40 mg daily )   Consider low cholesterol diet   Manage per primary team   F/u levels as out pt      Contact via [employer] Teams during business hours  To reach covering provider access AMION via sun[employer] tools  For Urgent matters/after-hours/weekends/holidays please page endocrine fellow on call   For nonurgent matters please email [email or web address]    Please note that this patient may be followed by different provider tomorrow.  Notify endocrine 24 hours prior to discharge for final recommendations

## 2024-05-10 NOTE — PROGRESS NOTE ADULT - ASSESSMENT
53F w/h/o uncontrolled T2DM (A1C >15%) on basal/bolus insulin therapy with poor adherence. DM c/b CAD/MI/CVA. Also HTN/HLD. Here with severe sudden-onset bifrontal HA with associated neck stiffness/blurry vision >  admitted with SAH/DKA (AG 20, bicarb 18, BHB 7.3)/UTI. Endocrinology was consulted for management of diabetes mellitus.     Tolerating POs, eats full meals.  Last 24 hour BG values in 100s except tightly controlled BG at HS ( 86) yesterday.     Patient has poor vision as per team > cannot see in left eye and rt eye is blurry.   Spoke with team that she needs to learn and familiarized in measuring insulin doses by click counting and giving insulin injection.   Family and her neighbor friend need to learn insulin injections and BG check so they can provide supervision and assist patient for all insulin injection.          53F w/h/o uncontrolled T2DM (A1C >15%) on basal/bolus insulin therapy with poor adherence. DM c/b CAD/MI/CVA. Also HTN/HLD. Here with severe sudden-onset bifrontal HA with associated neck stiffness/blurry vision >  admitted with SAH/DKA (AG 20, bicarb 18, BHB 7.3)/UTI. Endocrinology was consulted for management of diabetes mellitus.     Tolerating POs, eats full meals.  Last 24 hour BG values in 100s except tightly controlled BG at HS ( 86) yesterday.     Patient has poor vision as per team > cannot see in left eye and rt eye is blurry.   Spoke with team that she needs to learn and familiarized in measuring insulin doses by counting clicks and giving insulin injection.   Family and her neighbor friend need to learn insulin injections and BG check so they can provide supervision and assist patient for all insulin injection.

## 2024-05-10 NOTE — PROGRESS NOTE ADULT - ASSESSMENT
53F, no AC/AP, pmh diabetes, p/w severe sudden-onset bifrontal HA,  with associated neck stiffness/blurry vision. Endorses blurry vision for over 1 year, HA has improved. Has also been having long-time peripheral neuropathy. Though diabetes was diagnosed > 10 years ago per endocrine note suspect overall poor follow up - patient endorses seeing PCP for the first time recently, saw optho a few months ago and says the pharmacy does not dispense her many insulin pens. Likely missing doses. Also endorses BG running 300-400 at home. Only currently on short acting insulin, no other home medications. Never seen an endocrinologist. 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 LP in ED w/ 71712 RBCs, and xanthochromic. Afebrile, WBC wnl. PLTs wnl, Coags wnl. Cerebral angio 5/4 which was negative for vascular malformations . Course was c/b DKA AG 20, bicarb 18 and UTI now s/p x3 days ceftriaxone (5/7-).     Plan    Neuro stable Continue nimodipine   Vitals stable  Type 2 DM- On Lantus 20U & premeal admelog 12U tid  DVT ppx  PT- Home PT    53F, no AC/AP, pmh diabetes, p/w severe sudden-onset bifrontal HA,  with associated neck stiffness/blurry vision. Endorses blurry vision for over 1 year, HA has improved. Has also been having long-time peripheral neuropathy. Though diabetes was diagnosed > 10 years ago per endocrine note suspect overall poor follow up - patient endorses seeing PCP for the first time recently, saw optho a few months ago and says the pharmacy does not dispense her many insulin pens. Likely missing doses. Also endorses BG running 300-400 at home. Only currently on short acting insulin, no other home medications. Never seen an endocrinologist. 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 LP in ED w/ 51020 RBCs, and xanthochromic. Afebrile, WBC wnl. PLTs wnl, Coags wnl. Cerebral angio 5/4 which was negative for vascular malformations . Course was c/b DKA AG 20, bicarb 18 and UTI now s/p x3 days ceftriaxone (5/7-).     Plan    Neuro stable Continue nimodipine   Vitals stable  Type 2 DM- On Lantus 20U & premeal admelog 12U tid. Blood sugars well controlled. Needs family education. Pt w severe visual acuity deficit . Insulin &  diabetic supplies script to [facility]. Endo following    DVT ppx  PT- Home PT

## 2024-05-11 LAB
C PEPTIDE SERPL-MCNC: 0.2 NG/ML — LOW (ref 1.1–4.4)
GLUCOSE BLDC GLUCOMTR-MCNC: 121 MG/DL — HIGH (ref 70–99)
GLUCOSE BLDC GLUCOMTR-MCNC: 137 MG/DL — HIGH (ref 70–99)
GLUCOSE BLDC GLUCOMTR-MCNC: 145 MG/DL — HIGH (ref 70–99)
GLUCOSE BLDC GLUCOMTR-MCNC: 151 MG/DL — HIGH (ref 70–99)
GLUCOSE BLDC GLUCOMTR-MCNC: 280 MG/DL — HIGH (ref 70–99)
GLUCOSE BLDC GLUCOMTR-MCNC: 67 MG/DL — LOW (ref 70–99)
GLUCOSE BLDC GLUCOMTR-MCNC: 73 MG/DL — SIGNIFICANT CHANGE UP (ref 70–99)
GLUCOSE BLDC GLUCOMTR-MCNC: 75 MG/DL — SIGNIFICANT CHANGE UP (ref 70–99)
GLUCOSE BLDC GLUCOMTR-MCNC: 88 MG/DL — SIGNIFICANT CHANGE UP (ref 70–99)

## 2024-05-11 PROCEDURE — 99232 SBSQ HOSP IP/OBS MODERATE 35: CPT

## 2024-05-11 PROCEDURE — 70450 CT HEAD/BRAIN W/O DYE: CPT | Mod: 26

## 2024-05-11 PROCEDURE — 99233 SBSQ HOSP IP/OBS HIGH 50: CPT

## 2024-05-11 RX ORDER — INSULIN LISPRO 100/ML
10 VIAL (ML) SUBCUTANEOUS
Refills: 0 | Status: DISCONTINUED | OUTPATIENT
Start: 2024-05-11 | End: 2024-05-14

## 2024-05-11 RX ORDER — INSULIN LISPRO 100/ML
VIAL (ML) SUBCUTANEOUS
Refills: 0 | Status: DISCONTINUED | OUTPATIENT
Start: 2024-05-11 | End: 2024-05-17

## 2024-05-11 RX ADMIN — NIMODIPINE 60 MILLIGRAM(S): 60 SOLUTION ORAL at 17:06

## 2024-05-11 RX ADMIN — ENOXAPARIN SODIUM 40 MILLIGRAM(S): 100 INJECTION SUBCUTANEOUS at 17:06

## 2024-05-11 RX ADMIN — NIMODIPINE 60 MILLIGRAM(S): 60 SOLUTION ORAL at 05:04

## 2024-05-11 RX ADMIN — INSULIN GLARGINE 20 UNIT(S): 100 INJECTION, SOLUTION SUBCUTANEOUS at 21:41

## 2024-05-11 RX ADMIN — Medication 1: at 21:41

## 2024-05-11 RX ADMIN — ATORVASTATIN CALCIUM 40 MILLIGRAM(S): 80 TABLET, FILM COATED ORAL at 21:44

## 2024-05-11 RX ADMIN — Medication 2: at 11:55

## 2024-05-11 RX ADMIN — Medication 10 UNIT(S): at 17:06

## 2024-05-11 RX ADMIN — POLYETHYLENE GLYCOL 3350 17 GRAM(S): 17 POWDER, FOR SOLUTION ORAL at 17:06

## 2024-05-11 RX ADMIN — SENNA PLUS 2 TABLET(S): 8.6 TABLET ORAL at 21:40

## 2024-05-11 RX ADMIN — Medication 1 APPLICATION(S): at 17:17

## 2024-05-11 RX ADMIN — Medication 5 MILLIGRAM(S): at 21:40

## 2024-05-11 RX ADMIN — Medication 12 UNIT(S): at 07:55

## 2024-05-11 RX ADMIN — NIMODIPINE 60 MILLIGRAM(S): 60 SOLUTION ORAL at 11:57

## 2024-05-11 RX ADMIN — Medication 12 UNIT(S): at 11:55

## 2024-05-11 RX ADMIN — NIMODIPINE 60 MILLIGRAM(S): 60 SOLUTION ORAL at 02:14

## 2024-05-11 RX ADMIN — NIMODIPINE 60 MILLIGRAM(S): 60 SOLUTION ORAL at 21:40

## 2024-05-11 RX ADMIN — NIMODIPINE 60 MILLIGRAM(S): 60 SOLUTION ORAL at 14:58

## 2024-05-11 NOTE — CHART NOTE - NSCHARTNOTESELECT_GEN_ALL_CORE
Nutrition Services
TCD
VTE Risk Assessment/Event Note
Interventional Neuro Radiology/Event Note
Interventional Neuro Radiology/Event Note
TCD
TCD

## 2024-05-11 NOTE — PROGRESS NOTE ADULT - ASSESSMENT
53F, no AC/AP, pmh diabetes, p/w severe sudden-onset bifrontal HA,  with associated neck stiffness/blurry vision. Endorses blurry vision for over 1 year, HA has improved. Has also been having long-time peripheral neuropathy. Though diabetes was diagnosed > 10 years ago per endocrine note suspect overall poor follow up - patient endorses seeing PCP for the first time recently, saw optho a few months ago and says the pharmacy does not dispense her many insulin pens. Likely missing doses. Also endorses BG running 300-400 at home. Only currently on short acting insulin, no other home medications. Never seen an endocrinologist. 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 LP in ED w/ 89831 RBCs, and xanthochromic. Afebrile, WBC wnl. PLTs wnl, Coags wnl. Cerebral angio 5/4 which was negative for vascular malformations . Course was c/b DKA AG 20, bicarb 18 and UTI now s/p x3 days ceftriaxone (5/7-).     Plan    Neuro stable Continue nimodipine   Vitals stable  Type 2 DM- On Lantus 20U & premeal admelog 12U tid. Blood sugars well controlled. Needs family education. Pt w severe visual acuity deficit . Insulin &  diabetic supplies script to [facility]. Endo following    DVT ppx  PT- Home PT    53F, no AC/AP, pmh diabetes, p/w severe sudden-onset bifrontal HA,  with associated neck stiffness/blurry vision. Endorses blurry vision for over 1 year, HA has improved. Has also been having long-time peripheral neuropathy. Though diabetes was diagnosed > 10 years ago per endocrine note suspect overall poor follow up - patient endorses seeing PCP for the first time recently, saw optho a few months ago and says the pharmacy does not dispense her many insulin pens. Likely missing doses. Also endorses BG running 300-400 at home. Only currently on short acting insulin, no other home medications. Never seen an endocrinologist. 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 LP in ED w/ 37684 RBCs, and xanthochromic. Afebrile, WBC wnl. PLTs wnl, Coags wnl. Cerebral angio 5/4 which was negative for vascular malformations . Course was c/b DKA AG 20, bicarb 18 and UTI now s/p x3 days ceftriaxone (5/7-).     Plan    Neuro stable Continue nimodipine   Vitals stable  Type 2 DM- On Lantus 20U & premeal admelog 12U tid. Blood sugars well controlled. Needs family education. Pt w severe visual acuity deficit . Insulin &  diabetic supplies script to [facility]. Endo following    DVT ppx  PT- Home PT      s/p    PLAN:  NEURO:  CARDIOVASCULAR:  PULMONARY:  RENAL:  GI:  HEME:  ID:  ENDO:    DVT PROPHYLAXIS:  [] Venodynes                                [] Heparin/Lovenox    FALL RISK:  [] Low Risk                                    [] Impulsive 53F, no AC/AP, pmh diabetes, p/w severe sudden-onset bifrontal HA,  with associated neck stiffness/blurry vision. Endorses blurry vision for over 1 year, HA has improved. Has also been having long-time peripheral neuropathy. Though diabetes was diagnosed > 10 years ago per endocrine note suspect overall poor follow up - patient endorses seeing PCP for the first time recently, saw optho a few months ago and says the pharmacy does not dispense her many insulin pens. Likely missing doses. Also endorses BG running 300-400 at home. Only currently on short acting insulin, no other home medications. Never seen an endocrinologist. 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 LP in ED w/ 84345 RBCs, and xanthochromic. Afebrile, WBC wnl. PLTs wnl, Coags wnl. Cerebral angio 5/4 which was negative for vascular malformations . Course was c/b DKA AG 20, bicarb 18 and UTI now s/p x3 days ceftriaxone (5/7-).     Plan    Neuro stable   Continue nimodipine  TCD's have been normal    CV   Hemodynamically stable  c/w Lipitor    PULMONARY:  Satting > 97% on RA  incentive spirometer    GI:  CCD diet  Bowel regimen Miralax/senna  Last BM today    RENAL:  Voiding  IVL    ENDO:  Type 2 DM- On Lantus 20U & premeal admelog 12U for breakfast and lunch and 10 units for dinner. Blood sugars well controlled. Needs family education. Pt w severe visual acuity deficit . Insulin &  diabetic supplies script to [facility]. Endo following      ID: Afebrile    HEME:  DVT ppx    Dispo: PT- Home PT   Discussed with Dr Seth    [id number]                     DVT PROPHYLAXIS:  [] Venodynes                                [] Heparin/Lovenox    FALL RISK:  [] Low Risk                                    [] Impulsive 53F, no AC/AP, pmh diabetes, p/w severe sudden-onset bifrontal HA,  with associated neck stiffness/blurry vision. Endorses blurry vision for over 1 year, HA has improved. Has also been having long-time peripheral neuropathy. Though diabetes was diagnosed > 10 years ago per endocrine note suspect overall poor follow up - patient endorses seeing PCP for the first time recently, saw optho a few months ago and says the pharmacy does not dispense her many insulin pens. Likely missing doses. Also endorses BG running 300-400 at home. Only currently on short acting insulin, no other home medications. Never seen an endocrinologist. 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 LP in ED w/ 58271 RBCs, and xanthochromic. Afebrile, WBC wnl. PLTs wnl, Coags wnl. Cerebral angio 5/4 which was negative for vascular malformations . Course was c/b DKA AG 20, bicarb 18 and UTI now s/p x3 days ceftriaxone (5/7-).     Plan    Neuro stable   Continue nimodipine  TCD's have been normal    CV   Hemodynamically stable  c/w Lipitor    PULMONARY:  Satting > 97% on RA  incentive spirometer    GI:  CCD diet  Bowel regimen Miralax/senna  Last BM today    RENAL:  Voiding  IVL    ENDO:  Type 2 DM- On Lantus 20U & premeal admelog 12U for breakfast and lunch and 10 units for dinner. Blood sugars well controlled. Needs family education. Pt w severe visual acuity deficit . Insulin &  diabetic supplies script to [facility]. Endo following      ID: Afebrile    DVT PROPHYLAXIS:  [] Venodynes                                [x] Heparin/Lovenox    FALL RISK:  [x] Low Risk                                    [] Impulsive    Miscellaneous: Lt forearm blister: Silvadene cream ordered, wond care consult pend    Dispo: PT- Home PT   Discussed with Dr Seth    [id number]                    53F, no AC/AP, pmh diabetes, p/w severe sudden-onset bifrontal HA,  with associated neck stiffness/blurry vision. Endorses blurry vision for over 1 year, HA has improved. Has also been having long-time peripheral neuropathy. Though diabetes was diagnosed > 10 years ago per endocrine note suspect overall poor follow up - patient endorses seeing PCP for the first time recently, saw optho a few months ago and says the pharmacy does not dispense her many insulin pens. Likely missing doses. Also endorses BG running 300-400 at home. Only currently on short acting insulin, no other home medications. Never seen an endocrinologist. 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 LP in ED w/ 97750 RBCs, and xanthochromic. Afebrile, WBC wnl. PLTs wnl, Coags wnl. Cerebral angio 5/4 which was negative for vascular malformations . Course was c/b DKA AG 20, bicarb 18 and UTI now s/p x3 days ceftriaxone (5/7-).     Plan    Neuro stable   Continue nimodipine  TCD's have been normal    CV   Hemodynamically stable  c/w Lipitor    PULMONARY:  Satting > 97% on RA  incentive spirometer    GI:  CCD diet  Bowel regimen Miralax/senna  Last BM today    RENAL:  Voiding  IVL    ENDO:  Type 2 DM- On Lantus 20U & premeal admelog 12U for breakfast and lunch and 10 units for dinner. Blood sugars well controlled. Needs family education. Pt w severe visual acuity deficit . Insulin &  diabetic supplies script to [facility]. Endo following      ID: Afebrile    DVT PROPHYLAXIS:  [] Venodynes                                [x] Heparin/Lovenox    FALL RISK:  [x] Low Risk                                    [] Impulsive    Miscellaneous: Lt forearm blister: Silvadene cream ordered, wond care consult pend    Dispo: PT- out patient PT   Discussed with Dr Seth    [id number]

## 2024-05-11 NOTE — CHART NOTE - NSCHARTNOTEFT_GEN_A_CORE
NUTRITION FOLLOW UP NOTE    PATIENT SEEN FOR: Nutrition Follow Up/Consult    SOURCE: [x] Patient  [x] Current Medical Record  [x] RN  [] Family/support person at bedside  [] Patient unavailable/inappropriate  [x] Other: interdisciplinary medical team    CHART REVIEWED/EVENTS NOTED.  [] No changes to nutrition care plan to note  [x] Nutrition Status:  -Endocrinology following in setting of DM, A1c 15%.     DIET ORDER:   Diet, Consistent Carbohydrate/No Snacks:   Supplement Feeding Modality:  Oral  Probiotic Yogurt/Smoothie Cans or Servings Per Day:  2       Frequency:  Daily (24)    CURRENT DIET ORDER IS:  [x] Appropriate:  [] Inadequate:  [] Other:    NUTRITION INTAKE/PROVISION:  [x] PO: Pt reports variable appetite/PO intake, fluctuates between fair-good. Endorsed occasional nausea in AM which prevents her from wanting to eat breakfast.   [] Enteral Nutrition:  [] Parenteral Nutrition:    ANTHROPOMETRICS:  Drug Dosing Weight  Height (cm): 157.5 (04 May 2024 08:00)  Weight (kg): 68 (04 May 2024 08:00)  BMI (kg/m2): 27.4 (04 May 2024 08:00)  BSA (m2): 1.69 (04 May 2024 08:00)  Weights:   Daily Weight in k ()     NUTRITIONALLY PERTINENT MEDICATIONS:  MEDICATIONS  (STANDING):  atorvastatin  bisacodyl  insulin glargine Injectable (LANTUS)  insulin lispro (ADMELOG) corrective regimen sliding scale  insulin lispro (ADMELOG) corrective regimen sliding scale  insulin lispro Injectable (ADMELOG)  insulin lispro Injectable (ADMELOG)  insulin lispro Injectable (ADMELOG)  niMODipine  polyethylene glycol 3350  senna     NUTRITIONALLY PERTINENT LABS:  05-10 Na138 mmol/L Glu 224 mg/dL<H> K+ 3.9 mmol/L Cr  0.85 mg/dL BUN 13 mg/dL 05-10 Phos 3.5 mg/dL  Chol 389 mg/dL<H> LDL --    HDL 73 mg/dL Trig 203 mg/dL<H>  24 @ 18:28 a1c >15.5  24 @ 02:59 a1c >15.5    A1C with Estimated Average Glucose Result: >15.5 % (24 @ 18:28)  A1C with Estimated Average Glucose Result: >15.5 % (24 @ 02:59)    Finger Sticks:  POCT Blood Glucose.: 151 mg/dL ( @ 11:50)  POCT Blood Glucose.: 137 mg/dL ( @ 07:29)  POCT Blood Glucose.: 121 mg/dL ( @ 06:10)  POCT Blood Glucose.: 246 mg/dL (05-10 @ 21:20)  POCT Blood Glucose.: 124 mg/dL (05-10 @ 16:53)    NUTRITIONALLY PERTINENT MEDICATIONS/LABS:  [x] Reviewed  [x] Relevant notes on medications/labs: - prescribed Lantus 20u bedtime, Admelog 10u before dinner, 12u before breakfast and lunch.     EDEMA:  [x] Reviewed - none noted  [] Relevant notes:    GI/ I&O:  [x] Reviewed  [x] Relevant notes: last BM 5/11 x 1. On bowel regimen (senna, Miralax, Dulcolax).   [] Other:    SKIN:   [x] No pressure injuries documented, per nursing flowsheet  [] Pressure injury previously noted  [] Change in pressure injury documentation:  [] Other:    ESTIMATED NEEDS:  [] No change:  [x] Updated:   Energy: 7427-9948 kcal/day (25-30 kcal/kg)  Protein: 81-95 g/day (1.2-1.4 g/kg)  Fluid:   ml/day or [d] defer to team  Based on: dosing wt 68kg    NUTRITION DIAGNOSIS:  [x] Prior Dx: Increased nutrient needs, Altered nutrition related lab values  [x] New Dx: Food and Nutrition Related Knowledge Deficit related to limited prior knowledge on therapeutic diet as evidenced by A1c 15%, pt with questions re: consistent carbohydrate diet.     EDUCATION:  [x] Yes: Provided diet education regarding T2DM. Education included dietary sources of carbohydrates (i.e. concentrated sweets, starches, dairy, fruit), monitoring portion sizes of carbohydrates, and including good sources of high biological protein & fiber with carbohydrates during meals/snacks, importance of consistent meal pattern, MyPlate healthy eating model, limiting concentrated sweets in diet, and importance of monitoring fingersticks daily. Verbal education provided.  [] Not appropriate/warranted    NUTRITION CARE PLAN:  1. Diet: Continue Consistent Carbohydrate diet. Defer consistency to medical team, SLP.   2. Supplements: May consider discontinuing Sergey Active 2x daily as pt not on antibiotics at present time.   3. Multivitamin/mineral supplementation: Recommend multivitamin (if no medication contraindications) to aid in prevention of micronutrient deficiencies.   4: Antihyperglycemic regimen deferred to team. RD to remain available for further education/review PRN.     MONITORING AND EVALUATION:   RD remains available upon request and will follow up per protocol.    Macarena Mcconnell RD, available via TEAMS   Available on [facility] TEAMS NUTRITION FOLLOW UP NOTE    PATIENT SEEN FOR: Nutrition Follow Up/Consult    SOURCE: [x] Patient  [x] Current Medical Record  [x] RN  [] Family/support person at bedside  [] Patient unavailable/inappropriate  [x] Other: interdisciplinary medical team    CHART REVIEWED/EVENTS NOTED.  [] No changes to nutrition care plan to note  [x] Nutrition Status:  -Endocrinology following in setting of DM, A1c 15%.     DIET ORDER:   Diet, Consistent Carbohydrate/No Snacks:   Supplement Feeding Modality:  Oral  Probiotic Yogurt/Smoothie Cans or Servings Per Day:  2       Frequency:  Daily (24)    CURRENT DIET ORDER IS:  [x] Appropriate:  [] Inadequate:  [] Other:    NUTRITION INTAKE/PROVISION:  [x] PO: Pt reports variable appetite/PO intake, fluctuates between fair-good. Endorsed occasional nausea in AM which prevents her from wanting to eat breakfast.   [] Enteral Nutrition:  [] Parenteral Nutrition:    ANTHROPOMETRICS:  Drug Dosing Weight  Height (cm): 157.5 (04 May 2024 08:00)  Weight (kg): 68 (04 May 2024 08:00)  BMI (kg/m2): 27.4 (04 May 2024 08:00)  BSA (m2): 1.69 (04 May 2024 08:00)  Weights:   Daily Weight in k ()     NUTRITIONALLY PERTINENT MEDICATIONS:  MEDICATIONS  (STANDING):  atorvastatin  bisacodyl  insulin glargine Injectable (LANTUS)  insulin lispro (ADMELOG) corrective regimen sliding scale  insulin lispro (ADMELOG) corrective regimen sliding scale  insulin lispro Injectable (ADMELOG)  insulin lispro Injectable (ADMELOG)  insulin lispro Injectable (ADMELOG)  niMODipine  polyethylene glycol 3350  senna     NUTRITIONALLY PERTINENT LABS:  05-10 Na138 mmol/L Glu 224 mg/dL<H> K+ 3.9 mmol/L Cr  0.85 mg/dL BUN 13 mg/dL 05-10 Phos 3.5 mg/dL  Chol 389 mg/dL<H> LDL --    HDL 73 mg/dL Trig 203 mg/dL<H>  24 @ 18:28 a1c >15.5  24 @ 02:59 a1c >15.5    A1C with Estimated Average Glucose Result: >15.5 % (24 @ 18:28)  A1C with Estimated Average Glucose Result: >15.5 % (24 @ 02:59)    Finger Sticks:  POCT Blood Glucose.: 151 mg/dL ( @ 11:50)  POCT Blood Glucose.: 137 mg/dL ( @ 07:29)  POCT Blood Glucose.: 121 mg/dL ( @ 06:10)  POCT Blood Glucose.: 246 mg/dL (05-10 @ 21:20)  POCT Blood Glucose.: 124 mg/dL (05-10 @ 16:53)    NUTRITIONALLY PERTINENT MEDICATIONS/LABS:  [x] Reviewed  [x] Relevant notes on medications/labs: - prescribed Lantus 20u bedtime, Admelog 10u before dinner, 12u before breakfast and lunch.     EDEMA:  [x] Reviewed - none noted  [] Relevant notes:    GI/ I&O:  [x] Reviewed  [x] Relevant notes: last BM 5/11 x 1. On bowel regimen (senna, Miralax, Dulcolax).   [] Other:    SKIN:   [x] No pressure injuries documented, per nursing flowsheet  [] Pressure injury previously noted  [] Change in pressure injury documentation:  [] Other:    ESTIMATED NEEDS:  [] No change:  [x] Updated:   Energy: 9494-6431 kcal/day (25-30 kcal/kg)  Protein: 81-95 g/day (1.2-1.4 g/kg)  Fluid:   ml/day or [d] defer to team  Based on: dosing wt 68kg    NUTRITION DIAGNOSIS:  [x] Prior Dx: Increased nutrient needs, Altered nutrition related lab values  [x] New Dx: Food and Nutrition Related Knowledge Deficit related to limited prior knowledge on therapeutic diet as evidenced by A1c 15%, pt with questions re: consistent carbohydrate diet.     EDUCATION:  [x] Yes: Provided diet education regarding T2DM. Education included dietary sources of carbohydrates (i.e. concentrated sweets, starches, dairy, fruit), monitoring portion sizes of carbohydrates, and including good sources of high biological protein & fiber with carbohydrates during meals/snacks, importance of consistent meal pattern, MyPlate healthy eating model, limiting concentrated sweets in diet, and importance of monitoring fingersticks daily. Verbal education provided.  [] Not appropriate/warranted    NUTRITION CARE PLAN:  1. Diet: Continue Consistent Carbohydrate diet. Defer consistency to medical team, SLP.   2. Supplements: May consider discontinuing Sergey Active 2x daily as pt not on antibiotics at present time.   3. Multivitamin/mineral supplementation: Recommend multivitamin (if no medication contraindications) to aid in prevention of micronutrient deficiencies.   4: Antihyperglycemic regimen deferred to team. RD to remain available for further education/review PRN.   5. RD to add diet Mighty Shakes twice daily with meal trays in setting of variable PO intake.    MONITORING AND EVALUATION:   RD remains available upon request and will follow up per protocol.    Macarena Mcconnell RD, available via TEAMS

## 2024-05-11 NOTE — PROGRESS NOTE ADULT - SUBJECTIVE AND OBJECTIVE BOX
53F, no AC/AP, pmh diabetes, p/w severe sudden-onset bifrontal HA this AM with associated neck stiffness/blurry vision. CTH/CTA read as negative by radiologist but appears to have R perimes SAH (HH1, MF1). LP in ED w/ 08033 RBCs, and xanthochromic. Afebrile, WBC wnl. PLTs wnl, Coags wnl.      (04 May 2024 01:26)      PAST MEDICAL & SURGICAL HISTORY:  No significant past surgical history        Vital Signs Last 24 Hrs  T(C): 37.2 (11 May 2024 04:55), Max: 37.2 (11 May 2024 04:55)  T(F): 99 (11 May 2024 04:55), Max: 99 (11 May 2024 04:55)  HR: 81 (11 May 2024 04:55) (79 - 94)  BP: 132/80 (11 May 2024 04:55) (119/73 - 148/86)  BP(mean): --  RR: 18 (11 May 2024 04:55) (18 - 20)  SpO2: 99% (11 May 2024 04:55) (97% - 99%)    Parameters below as of 11 May 2024 04:55  Patient On (Oxygen Delivery Method): room air                              8.4    4.51  )-----------( 380      ( 10 May 2024 09:31 )             26.5    05-10    138  |  105  |  13  ----------------------------<  224<H>  3.9   |  23  |  0.85    Ca    9.5      10 May 2024 09:31  Phos  3.5     05-10  Mg     2.0     05-10       Stroke Core Measures      DRAIN OUTPUT:     NEUROIMAGING:     PHYSICAL EXAM:    General: No Acute Distress     Neurological: Awake, alert oriented to person, place and time, Following Commands, PERRL, EOMI, Face Symmetrical, Speech Fluent, Moving all extremities, Muscle Strength normal in all four extremities, No Drift, Sensation to Light Touch Intact    Pulmonary: Clear to Auscultation, No Rales, No Rhonchi, No Wheezes     Cardiovascular: S1, S2, Regular Rate and Rhythm     Gastrointestinal: Soft, Nontender, Nondistended     Incision:     MEDICATIONS:   Antibiotics:    Neuro:  acetaminophen     Tablet .. 650 milliGRAM(s) Oral every 6 hours PRN Temp greater or equal to 38.5C (101.3F), Mild Pain (1 - 3)  ondansetron Injectable 4 milliGRAM(s) IV Push every 8 hours PRN Nausea and/or Vomiting  oxyCODONE    IR 5 milliGRAM(s) Oral every 4 hours PRN Moderate Pain (4 - 6)    Anticoagulation:  enoxaparin Injectable 40 milliGRAM(s) SubCutaneous <User Schedule>    Cardiology:  niMODipine 60 milliGRAM(s) Oral every 4 hours    Endo:   atorvastatin 40 milliGRAM(s) Oral at bedtime  insulin glargine Injectable (LANTUS) 20 Unit(s) SubCutaneous at bedtime  insulin lispro (ADMELOG) corrective regimen sliding scale   SubCutaneous three times a day before meals  insulin lispro (ADMELOG) corrective regimen sliding scale   SubCutaneous at bedtime  insulin lispro Injectable (ADMELOG) 10 Unit(s) SubCutaneous before dinner  insulin lispro Injectable (ADMELOG) 12 Unit(s) SubCutaneous before breakfast  insulin lispro Injectable (ADMELOG) 12 Unit(s) SubCutaneous before lunch    Pulm:    GI/:  bisacodyl 5 milliGRAM(s) Oral at bedtime  polyethylene glycol 3350 17 Gram(s) Oral every 12 hours  senna 2 Tablet(s) Oral at bedtime    Other:  bisacodyl 5 milliGRAM(s) Oral at bedtime  polyethylene glycol 3350 17 Gram(s) Oral every 12 hours  senna 2 Tablet(s) Oral at bedtime    53F, no AC/AP, pmh diabetes, p/w severe sudden-onset bifrontal HA this AM with associated neck stiffness/blurry vision. CTH/CTA read as negative by radiologist but appears to have R perimes SAH (HH1, MF1). LP in ED w/ 10912 RBCs, and xanthochromic. Afebrile, WBC wnl. PLTs wnl, Coags wnl.     Overnight event: no acute event, patient stated that she is feeling better    Vital Signs Last 24 Hrs  T(C): 37.2 (11 May 2024 04:55), Max: 37.2 (11 May 2024 04:55)  T(F): 99 (11 May 2024 04:55), Max: 99 (11 May 2024 04:55)  HR: 81 (11 May 2024 04:55) (79 - 94)  BP: 132/80 (11 May 2024 04:55) (119/73 - 148/86)  BP(mean): --  RR: 18 (11 May 2024 04:55) (18 - 20)  SpO2: 99% (11 May 2024 04:55) (97% - 99%)    Parameters below as of 11 May 2024 04:55  Patient On (Oxygen Delivery Method): room air                              8.4    4.51  )-----------( 380      ( 10 May 2024 09:31 )             26.5    05-10    138  |  105  |  13  ----------------------------<  224<H>  3.9   |  23  |  0.85    Ca    9.5      10 May 2024 09:31  Phos  3.5     05-10  Mg     2.0     05-10       Stroke Core Measures      DRAIN OUTPUT:     NEUROIMAGING:     PHYSICAL EXAM:    General: No Acute Distress     Neurological: Awake, alert oriented to person, place and time, Following Commands, PERRL, EOMI, Face Symmetrical, Speech Fluent, Moving all extremities, Muscle Strength normal in all four extremities, No Drift, Sensation to Light Touch Intact, Lt frorearm blister noted    Pulmonary: Clear to Auscultation, No Rales, No Rhonchi, No Wheezes     Cardiovascular: S1, S2, Regular Rate and Rhythm     Gastrointestinal: Soft, Nontender, Nondistended     Incision:     MEDICATIONS:   Antibiotics:    Neuro:  acetaminophen     Tablet .. 650 milliGRAM(s) Oral every 6 hours PRN Temp greater or equal to 38.5C (101.3F), Mild Pain (1 - 3)  ondansetron Injectable 4 milliGRAM(s) IV Push every 8 hours PRN Nausea and/or Vomiting  oxyCODONE    IR 5 milliGRAM(s) Oral every 4 hours PRN Moderate Pain (4 - 6)    Anticoagulation:  enoxaparin Injectable 40 milliGRAM(s) SubCutaneous <User Schedule>    Cardiology:  niMODipine 60 milliGRAM(s) Oral every 4 hours    Endo:   atorvastatin 40 milliGRAM(s) Oral at bedtime  insulin glargine Injectable (LANTUS) 20 Unit(s) SubCutaneous at bedtime  insulin lispro (ADMELOG) corrective regimen sliding scale   SubCutaneous three times a day before meals  insulin lispro (ADMELOG) corrective regimen sliding scale   SubCutaneous at bedtime  insulin lispro Injectable (ADMELOG) 10 Unit(s) SubCutaneous before dinner  insulin lispro Injectable (ADMELOG) 12 Unit(s) SubCutaneous before breakfast  insulin lispro Injectable (ADMELOG) 12 Unit(s) SubCutaneous before lunch    Pulm:    GI/:  bisacodyl 5 milliGRAM(s) Oral at bedtime  polyethylene glycol 3350 17 Gram(s) Oral every 12 hours  senna 2 Tablet(s) Oral at bedtime    Other:  bisacodyl 5 milliGRAM(s) Oral at bedtime  polyethylene glycol 3350 17 Gram(s) Oral every 12 hours  senna 2 Tablet(s) Oral at bedtime

## 2024-05-11 NOTE — PROGRESS NOTE ADULT - SUBJECTIVE AND OBJECTIVE BOX
Patient is a 53y old  Female who presents with a chief complaint of severe headache, SAH (11 May 2024 07:57)      SUBJECTIVE / OVERNIGHT EVENTS: Pt seen and examined. No acute events overnight. She denies headache, CP, SOB.    MEDICATIONS  (STANDING):  atorvastatin 40 milliGRAM(s) Oral at bedtime  bisacodyl 5 milliGRAM(s) Oral at bedtime  enoxaparin Injectable 40 milliGRAM(s) SubCutaneous <User Schedule>  insulin glargine Injectable (LANTUS) 20 Unit(s) SubCutaneous at bedtime  insulin lispro (ADMELOG) corrective regimen sliding scale   SubCutaneous three times a day before meals  insulin lispro (ADMELOG) corrective regimen sliding scale   SubCutaneous at bedtime  insulin lispro Injectable (ADMELOG) 10 Unit(s) SubCutaneous before dinner  insulin lispro Injectable (ADMELOG) 12 Unit(s) SubCutaneous before breakfast  insulin lispro Injectable (ADMELOG) 12 Unit(s) SubCutaneous before lunch  niMODipine 60 milliGRAM(s) Oral every 4 hours  polyethylene glycol 3350 17 Gram(s) Oral every 12 hours  senna 2 Tablet(s) Oral at bedtime  silver sulfADIAZINE 1% Cream 1 Application(s) Topical two times a day    MEDICATIONS  (PRN):  acetaminophen     Tablet .. 650 milliGRAM(s) Oral every 6 hours PRN Temp greater or equal to 38.5C (101.3F), Mild Pain (1 - 3)  ondansetron Injectable 4 milliGRAM(s) IV Push every 8 hours PRN Nausea and/or Vomiting  oxyCODONE    IR 5 milliGRAM(s) Oral every 4 hours PRN Moderate Pain (4 - 6)      Vital Signs Last 24 Hrs  T(C): 37.2 (11 May 2024 04:55), Max: 37.2 (11 May 2024 04:55)  T(F): 99 (11 May 2024 04:55), Max: 99 (11 May 2024 04:55)  HR: 81 (11 May 2024 04:55) (79 - 86)  BP: 132/80 (11 May 2024 04:55) (119/73 - 145/73)  BP(mean): --  RR: 18 (11 May 2024 04:55) (18 - 18)  SpO2: 99% (11 May 2024 04:55) (97% - 99%)    Parameters below as of 11 May 2024 04:55  Patient On (Oxygen Delivery Method): room air      CAPILLARY BLOOD GLUCOSE      POCT Blood Glucose.: 151 mg/dL (11 May 2024 11:50)  POCT Blood Glucose.: 137 mg/dL (11 May 2024 07:29)  POCT Blood Glucose.: 121 mg/dL (11 May 2024 06:10)  POCT Blood Glucose.: 246 mg/dL (10 May 2024 21:20)  POCT Blood Glucose.: 124 mg/dL (10 May 2024 16:53)    I&O's Summary    10 May 2024 07:01  -  11 May 2024 07:00  --------------------------------------------------------  IN: 760 mL / OUT: 0 mL / NET: 760 mL    11 May 2024 07:01  -  11 May 2024 13:54  --------------------------------------------------------  IN: 480 mL / OUT: 0 mL / NET: 480 mL        PHYSICAL EXAM:  GENERAL: NAD,  well-groomed  RESPIRATORY: Normal respiratory effort; lungs are clear to auscultation bilaterally  CARDIOVASCULAR: regular, normal S1 and S2, trace lower extremity edema  ABDOMEN: Nontender to palpation, normoactive bowel sounds  MUSCULOSKELETAL: no clubbing or cyanosis of digits; no joint swelling or tenderness to palpation  PSYCH: A+O to person, place, and time; affect appropriate  NEUROLOGY: strength intact, no gross sensory deficits   SKIN: No rashes; no palpable lesions    LABS:                        8.4    4.51  )-----------( 380      ( 10 May 2024 09:31 )             26.5     05-10    138  |  105  |  13  ----------------------------<  224<H>  3.9   |  23  |  0.85    Ca    9.5      10 May 2024 09:31  Phos  3.5     05-10  Mg     2.0     05-10            Urinalysis Basic - ( 10 May 2024 09:31 )    Color: x / Appearance: x / SG: x / pH: x  Gluc: 224 mg/dL / Ketone: x  / Bili: x / Urobili: x   Blood: x / Protein: x / Nitrite: x   Leuk Esterase: x / RBC: x / WBC x   Sq Epi: x / Non Sq Epi: x / Bacteria: x

## 2024-05-11 NOTE — PROGRESS NOTE ADULT - ASSESSMENT
52 yo F PMH uncontrolled DMII who presented with severe HA, found to have SAH. s/p NSCU stay for monitoring, course c/b DKA for which endocrine is following, and UTI now s/p x3 days ceftriaxone (5/7-).      PCP Dr. Fay Wilson

## 2024-05-11 NOTE — PROVIDER CONTACT NOTE (HYPOGLYCEMIA EVENT) - NS PROVIDER CONTACT RECOMMEND-HYPO
4 oz juice x2 and cookies given. Pt was continuously checked q/15min with resolution of symptoms. Provider notified

## 2024-05-11 NOTE — PROVIDER CONTACT NOTE (HYPOGLYCEMIA EVENT) - NS PROVIDER CONTACT BACKGROUND-HYPO
Age: 53y    Gender: Female    POCT Blood Glucose:  75 mg/dL (05-11-24 @ 15:41)  73 mg/dL (05-11-24 @ 15:25)  67 mg/dL (05-11-24 @ 15:23)  151 mg/dL (05-11-24 @ 11:50)  137 mg/dL (05-11-24 @ 07:29)  121 mg/dL (05-11-24 @ 06:10)  246 mg/dL (05-10-24 @ 21:20)  124 mg/dL (05-10-24 @ 16:53)      eMAR:atorvastatin   40 milliGRAM(s) Oral (05-10-24 @ 21:29)    insulin glargine Injectable (LANTUS)   20 Unit(s) SubCutaneous (05-10-24 @ 21:28)    insulin lispro (ADMELOG) corrective regimen sliding scale   2 Unit(s) SubCutaneous (05-11-24 @ 11:55)    insulin lispro Injectable (ADMELOG)   10 Unit(s) SubCutaneous (05-10-24 @ 17:00)    insulin lispro Injectable (ADMELOG)   12 Unit(s) SubCutaneous (05-11-24 @ 07:55)    insulin lispro Injectable (ADMELOG)   12 Unit(s) SubCutaneous (05-11-24 @ 11:55)

## 2024-05-12 LAB
GLUCOSE BLDC GLUCOMTR-MCNC: 120 MG/DL — HIGH (ref 70–99)
GLUCOSE BLDC GLUCOMTR-MCNC: 193 MG/DL — HIGH (ref 70–99)
GLUCOSE BLDC GLUCOMTR-MCNC: 266 MG/DL — HIGH (ref 70–99)
GLUCOSE BLDC GLUCOMTR-MCNC: 322 MG/DL — HIGH (ref 70–99)

## 2024-05-12 PROCEDURE — 99232 SBSQ HOSP IP/OBS MODERATE 35: CPT

## 2024-05-12 RX ADMIN — Medication 3: at 17:23

## 2024-05-12 RX ADMIN — POLYETHYLENE GLYCOL 3350 17 GRAM(S): 17 POWDER, FOR SOLUTION ORAL at 17:23

## 2024-05-12 RX ADMIN — NIMODIPINE 60 MILLIGRAM(S): 60 SOLUTION ORAL at 21:26

## 2024-05-12 RX ADMIN — NIMODIPINE 60 MILLIGRAM(S): 60 SOLUTION ORAL at 05:29

## 2024-05-12 RX ADMIN — Medication 10 UNIT(S): at 11:44

## 2024-05-12 RX ADMIN — INSULIN GLARGINE 20 UNIT(S): 100 INJECTION, SOLUTION SUBCUTANEOUS at 21:26

## 2024-05-12 RX ADMIN — ATORVASTATIN CALCIUM 40 MILLIGRAM(S): 80 TABLET, FILM COATED ORAL at 21:27

## 2024-05-12 RX ADMIN — NIMODIPINE 60 MILLIGRAM(S): 60 SOLUTION ORAL at 17:22

## 2024-05-12 RX ADMIN — SENNA PLUS 2 TABLET(S): 8.6 TABLET ORAL at 21:28

## 2024-05-12 RX ADMIN — Medication 5 MILLIGRAM(S): at 21:28

## 2024-05-12 RX ADMIN — NIMODIPINE 60 MILLIGRAM(S): 60 SOLUTION ORAL at 14:12

## 2024-05-12 RX ADMIN — Medication 1 APPLICATION(S): at 17:59

## 2024-05-12 RX ADMIN — Medication 1 APPLICATION(S): at 05:28

## 2024-05-12 RX ADMIN — ENOXAPARIN SODIUM 40 MILLIGRAM(S): 100 INJECTION SUBCUTANEOUS at 17:22

## 2024-05-12 RX ADMIN — Medication 4: at 11:45

## 2024-05-12 RX ADMIN — NIMODIPINE 60 MILLIGRAM(S): 60 SOLUTION ORAL at 10:00

## 2024-05-12 RX ADMIN — Medication 10 UNIT(S): at 17:23

## 2024-05-12 RX ADMIN — NIMODIPINE 60 MILLIGRAM(S): 60 SOLUTION ORAL at 01:23

## 2024-05-12 NOTE — PROGRESS NOTE ADULT - SUBJECTIVE AND OBJECTIVE BOX
seen earlier today     Chief Complaint: Diabetes Mellitus follow up    INTERVAL HX:  Patient seen at bedside this morning. She had hypoglycemia at dinner yesterday. States she did eat lunch. Insulin was held at that time and  was also held this morning as she did not eat breakfast. Pre lunch now 322.     Review of Systems:  General: As above  GI: No nausea, vomiting  Endocrine: no  S&Sx of hypoglycemia    Allergies    No Known Allergies    Intolerances      MEDICATIONS  (STANDING):  atorvastatin 40 milliGRAM(s) Oral at bedtime  bisacodyl 5 milliGRAM(s) Oral at bedtime  enoxaparin Injectable 40 milliGRAM(s) SubCutaneous <User Schedule>  insulin glargine Injectable (LANTUS) 20 Unit(s) SubCutaneous at bedtime  insulin lispro (ADMELOG) corrective regimen sliding scale   SubCutaneous three times a day before meals  insulin lispro (ADMELOG) corrective regimen sliding scale   SubCutaneous at bedtime  insulin lispro Injectable (ADMELOG) 12 Unit(s) SubCutaneous before breakfast  insulin lispro Injectable (ADMELOG) 10 Unit(s) SubCutaneous before dinner  insulin lispro Injectable (ADMELOG) 10 Unit(s) SubCutaneous before lunch  niMODipine 60 milliGRAM(s) Oral every 4 hours  polyethylene glycol 3350 17 Gram(s) Oral every 12 hours  senna 2 Tablet(s) Oral at bedtime  silver sulfADIAZINE 1% Cream 1 Application(s) Topical two times a day      atorvastatin   40 milliGRAM(s) Oral (05-11-24 @ 21:44)    insulin glargine Injectable (LANTUS)   20 Unit(s) SubCutaneous (05-11-24 @ 21:41)    insulin lispro (ADMELOG) corrective regimen sliding scale   4 Unit(s) SubCutaneous (05-12-24 @ 11:45)    insulin lispro (ADMELOG) corrective regimen sliding scale   1 Unit(s) SubCutaneous (05-11-24 @ 21:41)    insulin lispro Injectable (ADMELOG)   10 Unit(s) SubCutaneous (05-12-24 @ 11:44)   10 Unit(s) SubCutaneous (05-11-24 @ 17:06)        PHYSICAL EXAM:  VITALS: T(C): 36.9 (05-12-24 @ 08:00)  T(F): 98.5 (05-12-24 @ 08:00), Max: 98.8 (05-12-24 @ 04:26)  HR: 61 (05-12-24 @ 08:00) (61 - 88)  BP: 155/89 (05-12-24 @ 08:00) (144/77 - 165/89)  RR:  (18 - 18)  SpO2:  (94% - 100%)  Wt(kg): --  GENERAL: NAD  Respiratory: Respirations unlabored   Extremities: Warm, no edema  NEURO: Alert , appropriate     LABS:  POCT Blood Glucose.: 322 mg/dL (05-12-24 @ 11:30)  POCT Blood Glucose.: 120 mg/dL (05-12-24 @ 08:06)  POCT Blood Glucose.: 280 mg/dL (05-11-24 @ 21:27)  POCT Blood Glucose.: 145 mg/dL (05-11-24 @ 16:33)  POCT Blood Glucose.: 88 mg/dL (05-11-24 @ 15:57)  POCT Blood Glucose.: 75 mg/dL (05-11-24 @ 15:41)  POCT Blood Glucose.: 73 mg/dL (05-11-24 @ 15:25)  POCT Blood Glucose.: 67 mg/dL (05-11-24 @ 15:23)  POCT Blood Glucose.: 151 mg/dL (05-11-24 @ 11:50)  POCT Blood Glucose.: 137 mg/dL (05-11-24 @ 07:29)  POCT Blood Glucose.: 121 mg/dL (05-11-24 @ 06:10)  POCT Blood Glucose.: 246 mg/dL (05-10-24 @ 21:20)  POCT Blood Glucose.: 124 mg/dL (05-10-24 @ 16:53)  POCT Blood Glucose.: 121 mg/dL (05-10-24 @ 11:46)  POCT Blood Glucose.: 136 mg/dL (05-10-24 @ 07:25)  POCT Blood Glucose.: 86 mg/dL (05-09-24 @ 21:52)  POCT Blood Glucose.: 125 mg/dL (05-09-24 @ 16:20)                          8.4    4.51  )-----------( 380      ( 10 May 2024 09:31 )             26.5             05-04 Chol 389<H> Direct LDL -- LDL calculated 274<H> HDL 73 Trig 203<H>  Thyroid Function Tests:  05-04 @ 02:59 TSH 2.44 FreeT4 1.1 T3 60 Anti TPO -- Anti Thyroglobulin Ab -- TSI --      A1C with Estimated Average Glucose Result: >15.5 % (05-06-24 @ 18:28)  A1C with Estimated Average Glucose Result: >15.5 % (05-04-24 @ 02:59)    Estimated Average Glucose: >398 mg/dL (05-06-24 @ 18:28)  Estimated Average Glucose: >398 mg/dL (05-04-24 @ 02:59)        Diet, Consistent Carbohydrate/No Snacks:   Supplement Feeding Modality:  Oral  Probiotic Yogurt/Smoothie Cans or Servings Per Day:  2       Frequency:  Daily (05-09-24 @ 09:29) [Active]

## 2024-05-12 NOTE — PROGRESS NOTE ADULT - ASSESSMENT
53F w/h/o uncontrolled T2DM (A1C >15%) on basal/bolus insulin therapy with poor adherence. DM c/b CAD/MI/CVA. Also HTN/HLD. Here with severe sudden-onset bifrontal HA with associated neck stiffness/blurry vision >  admitted with SAH/DKA (AG 20, bicarb 18, BHB 7.3)/UTI. Endocrinology was consulted for management of diabetes mellitus.     Tolerating POs, Had hypogylcemia yesterday dinnertime, states she did eat lunch.  Last 24 hour BG values variable from 67-322mg/dl.       Patient has poor vision as per team > cannot see in left eye and rt eye is blurry.   Spoke with team that she needs to learn and familiarized in measuring insulin doses by counting clicks and giving insulin injection.   Family and her neighbor friend need to learn insulin injections and BG check so they can provide supervision and assist patient for all insulin injection.

## 2024-05-12 NOTE — PROGRESS NOTE ADULT - ASSESSMENT
53F, no AC/AP, pmh diabetes, p/w severe sudden-onset bifrontal HA,  with associated neck stiffness/blurry vision. Endorses blurry vision for over 1 year, HA has improved. Has also been having long-time peripheral neuropathy. Though diabetes was diagnosed > 10 years ago per endocrine note suspect overall poor follow up - patient endorses seeing PCP for the first time recently, saw optho a few months ago and says the pharmacy does not dispense her many insulin pens. Likely missing doses. Also endorses BG running 300-400 at home. Only currently on short acting insulin, no other home medications. Never seen an endocrinologist. 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 LP in ED w/ 29661 RBCs, and xanthochromic. Afebrile, WBC wnl. PLTs wnl, Coags wnl. Cerebral angio 5/4 which was negative for vascular malformations . Course was c/b DKA AG 20, bicarb 18 and UTI now s/p x3 days ceftriaxone (5/7-).     Plan    Neuro stable   Continue nimodipine  TCD's have been normal    CV   Hemodynamically stable  c/w Lipitor    PULMONARY:  Satting > 97% on RA  incentive spirometer    GI:  CCD diet  Bowel regimen Miralax/senna  Last BM today    RENAL:  Voiding  [facility]    ENDO:  Type 2 DM- On Lantus 20U & premeal admelog 12U for breakfast and lunch and 10 units for dinner. Blood sugars well controlled. Needs family education. Pt w severe visual acuity deficit . Insulin &  diabetic supplies script to [facility]. Endo following      ID: Afebrile    DVT PROPHYLAXIS:  [] Venodynes                                [x] Heparin/Lovenox    FALL RISK:  [x] Low Risk                                    [] Impulsive    Miscellaneous: Lt forearm blister: Silvadene cream ordered, wond care consult pend    Dispo: PT- Home PT   Discussed with Dr Seth    [id number]                    53F, no AC/AP, pmh diabetes, p/w severe sudden-onset bifrontal HA,  with associated neck stiffness/blurry vision. Endorses blurry vision for over 1 year, HA has improved. Has also been having long-time peripheral neuropathy. Though diabetes was diagnosed > 10 years ago per endocrine note suspect overall poor follow up - patient endorses seeing PCP for the first time recently, saw optho a few months ago and says the pharmacy does not dispense her many insulin pens. Likely missing doses. Also endorses BG running 300-400 at home. Only currently on short acting insulin, no other home medications. Never seen an endocrinologist. 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 LP in ED w/ 89676 RBCs, and xanthochromic. Afebrile, WBC wnl. PLTs wnl, Coags wnl. Cerebral angio 5/4 which was negative for vascular malformations . Course was c/b DKA AG 20, bicarb 18 and UTI now s/p x3 days ceftriaxone (5/7-).     Plan    Neuro stable   Continue nimodipine  TCD's have been normal  Increase activity as tolerated    CV   Hemodynamically stable  c/w Lipitor    PULMONARY:  Satting > 94% on RA  incentive spirometer    GI:  CCD diet  Bowel regimen Miralax/senna  Last BM yesterday    RENAL:  Voiding  [facility]    ENDO:  Type 2 DM- On Lantus 20U & premeal admelog 12U for breakfast and lunch and 10 units for dinner. Blood sugars well controlled. Needs family education. Pt w severe visual acuity deficit . Insulin &  diabetic supplies script to [facility]. Endo following      ID: Afebrile    DVT PROPHYLAXIS:  [] Venodynes                                [x] Heparin/Lovenox    FALL RISK:  [x] Low Risk                                    [] Impulsive    Miscellaneous: Lt forearm blister: Silvadene cream ordered, wond care consult pend    Dispo: PT- out PT   Discussed with Dr Seth    [id number]                    53F, no AC/AP, pmh diabetes, p/w severe sudden-onset bifrontal HA,  with associated neck stiffness/blurry vision. Endorses blurry vision for over 1 year, HA has improved. Has also been having long-time peripheral neuropathy. Though diabetes was diagnosed > 10 years ago per endocrine note suspect overall poor follow up - patient endorses seeing PCP for the first time recently, saw optho a few months ago and says the pharmacy does not dispense her many insulin pens. Likely missing doses. Also endorses BG running 300-400 at home. Only currently on short acting insulin, no other home medications. Never seen an endocrinologist. 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 LP in ED w/ 73505 RBCs, and xanthochromic. Afebrile, WBC wnl. PLTs wnl, Coags wnl. Cerebral angio 5/4 which was negative for vascular malformations . Course was c/b DKA AG 20, bicarb 18 and UTI now s/p x3 days ceftriaxone (5/7-).     Plan    Neuro stable   Continue nimodipine  TCD's have been normal  Increase activity as tolerated    CV   Hemodynamically stable  c/w Lipitor    PULMONARY:  Satting > 94% on RA  incentive spirometer    GI:  CCD diet  Bowel regimen Miralax/senna  Last BM yesterday    RENAL:  Voiding  [facility]    ENDO:  Type 2 DM- On Lantus 20U & premeal admelog 12U for breakfast and lunch and 10 units for dinner. Blood sugars well controlled. Needs family education. Pt w severe visual acuity deficit . Insulin &  diabetic supplies script to [facility]. Endo following      ID: Afebrile    DVT PROPHYLAXIS:  [] Venodynes                                [x] Heparin/Lovenox    FALL RISK:  [x] Low Risk                                    [] Impulsive    Miscellaneous: Lt forearm blister: Silvadene cream ordered, wond care consult pend    Dispo: PT- out patient PT   Discussed with Dr Seth    [id number]

## 2024-05-12 NOTE — PROGRESS NOTE ADULT - PROBLEM SELECTOR PLAN 1
-Test BG ac and hs. Q6H while NPO  - Continue Lantus to 20 units QHS  - Continue Admelog 12-10-10 units with meals. Hold if NPO   - Awaiting for antibodies to r/o RIYA- so far  EBEN negative, Islet cell antibodies negative   - Continue low Admelog correction scale ACTID and at HS  - please keep hypoglycemia protocol in place  - Please teach patient, family and neighbor friend insulin pen use and glucose check     - Discharge planning:   Needs to c/w basal bolus insulin since pt was in DKA. Doses TBD  Patient will need basal and bolus on discharge ( X4/day insulin injection )   She will need supervision or assistance for all injections on her discharge for safety   Please make sure all family and neighbor friends who are planning to assist patient on insulin injections, learn insulin pen use and glucose check   Please arrange home care   Make sure pt has Rx for all DM supplies and insulin> send RX to [facility] for Novolog insulin pen. If not covered by insurance replace it with any other rapid acting analog  Can follow at [facility] practice. [address]. Phone . Email sent on 5/10   Needs Optho f/u as out pt

## 2024-05-12 NOTE — PROGRESS NOTE ADULT - SUBJECTIVE AND OBJECTIVE BOX
53F, no AC/AP, pmh diabetes, p/w severe sudden-onset bifrontal HA this AM with associated neck stiffness/blurry vision. CTH/CTA read as negative by radiologist but appears to have R perimes SAH (HH1, MF1). LP in ED w/ 13732 RBCs, and xanthochromic. Afebrile, WBC wnl. PLTs wnl, Coags wnl.     Overnight event;     Vital Signs Last 24 Hrs  T(C): 37.1 (12 May 2024 04:26), Max: 37.1 (12 May 2024 04:26)  T(F): 98.8 (12 May 2024 04:26), Max: 98.8 (12 May 2024 04:26)  HR: 80 (12 May 2024 04:26) (74 - 88)  BP: 150/77 (12 May 2024 04:26) (144/77 - 165/89)  BP(mean): --  RR: 18 (12 May 2024 04:26) (18 - 18)  SpO2: 100% (12 May 2024 04:26) (94% - 100%)    Parameters below as of 12 May 2024 04:26  Patient On (Oxygen Delivery Method): room air                              8.4    4.51  )-----------( 380      ( 10 May 2024 09:31 )             26.5    05-10    138  |  105  |  13  ----------------------------<  224<H>  3.9   |  23  |  0.85    Ca    9.5      10 May 2024 09:31  Phos  3.5     05-10  Mg     2.0     05-10       Stroke Core Measures      DRAIN OUTPUT:     NEUROIMAGING:     PHYSICAL EXAM:    General: No Acute Distress     Neurological: Awake, alert oriented to person, place and time, Following Commands, PERRL, EOMI, Face Symmetrical, Speech Fluent, Moving all extremities, Muscle Strength normal in all four extremities, No Drift, Sensation to Light Touch Intact    Pulmonary: Clear to Auscultation, No Rales, No Rhonchi, No Wheezes     Cardiovascular: S1, S2, Regular Rate and Rhythm     Gastrointestinal: Soft, Nontender, Nondistended     Incision:     MEDICATIONS:   Antibiotics:    Neuro:  acetaminophen     Tablet .. 650 milliGRAM(s) Oral every 6 hours PRN Temp greater or equal to 38.5C (101.3F), Mild Pain (1 - 3)  ondansetron Injectable 4 milliGRAM(s) IV Push every 8 hours PRN Nausea and/or Vomiting    Anticoagulation:  enoxaparin Injectable 40 milliGRAM(s) SubCutaneous <User Schedule>    Cardiology:  niMODipine 60 milliGRAM(s) Oral every 4 hours    Endo:   atorvastatin 40 milliGRAM(s) Oral at bedtime  insulin glargine Injectable (LANTUS) 20 Unit(s) SubCutaneous at bedtime  insulin lispro (ADMELOG) corrective regimen sliding scale   SubCutaneous three times a day before meals  insulin lispro (ADMELOG) corrective regimen sliding scale   SubCutaneous at bedtime  insulin lispro Injectable (ADMELOG) 12 Unit(s) SubCutaneous before breakfast  insulin lispro Injectable (ADMELOG) 10 Unit(s) SubCutaneous before dinner  insulin lispro Injectable (ADMELOG) 10 Unit(s) SubCutaneous before lunch    Pulm:    GI/:  bisacodyl 5 milliGRAM(s) Oral at bedtime  polyethylene glycol 3350 17 Gram(s) Oral every 12 hours  senna 2 Tablet(s) Oral at bedtime    Other:  silver sulfADIAZINE 1% Cream 1 Application(s) Topical two times a day      ASSESSMENT:   bisacodyl 5 milliGRAM(s) Oral at bedtime  polyethylene glycol 3350 17 Gram(s) Oral every 12 hours  senna 2 Tablet(s) Oral at bedtime   s/p    PLAN:  NEURO:  CARDIOVASCULAR:  PULMONARY:  RENAL:  GI:  HEME:  ID:  ENDO:    DVT PROPHYLAXIS:  [] Venodynes                                [] Heparin/Lovenox    FALL RISK:  [] Low Risk                                    [] Impulsive 53F, no AC/AP, pmh diabetes, p/w severe sudden-onset bifrontal HA this AM with associated neck stiffness/blurry vision. CTH/CTA read as negative by radiologist but appears to have R perimes SAH (HH1, MF1). LP in ED w/ 82650 RBCs, and xanthochromic. Afebrile, WBC wnl. PLTs wnl, Coags wnl.     Overnight event: no acute event was hypoglycemic yesterday afternoon however her sugar has been stable    Vital Signs Last 24 Hrs  T(C): 37.1 (12 May 2024 04:26), Max: 37.1 (12 May 2024 04:26)  T(F): 98.8 (12 May 2024 04:26), Max: 98.8 (12 May 2024 04:26)  HR: 80 (12 May 2024 04:26) (74 - 88)  BP: 150/77 (12 May 2024 04:26) (144/77 - 165/89)  BP(mean): --  RR: 18 (12 May 2024 04:26) (18 - 18)  SpO2: 100% (12 May 2024 04:26) (94% - 100%)    Parameters below as of 12 May 2024 04:26  Patient On (Oxygen Delivery Method): room air                              8.4    4.51  )-----------( 380      ( 10 May 2024 09:31 )             26.5    05-10    138  |  105  |  13  ----------------------------<  224<H>  3.9   |  23  |  0.85    Ca    9.5      10 May 2024 09:31  Phos  3.5     05-10  Mg     2.0     05-10       Stroke Core Measures      DRAIN OUTPUT:     NEUROIMAGING:     PHYSICAL EXAM:    General: No Acute Distress     Neurological: Awake, alert oriented to person, place and time, Following Commands, PERRL, EOMI, Face Symmetrical, Speech Fluent, Moving all extremities, Muscle Strength normal in all four extremities, No Drift, Sensation to Light Touch Intact    Pulmonary: Clear to Auscultation, No Rales, No Rhonchi, No Wheezes     Cardiovascular: S1, S2, Regular Rate and Rhythm     Gastrointestinal: Soft, Nontender, Nondistended     Incision:     MEDICATIONS:   Antibiotics:    Neuro:  acetaminophen     Tablet .. 650 milliGRAM(s) Oral every 6 hours PRN Temp greater or equal to 38.5C (101.3F), Mild Pain (1 - 3)  ondansetron Injectable 4 milliGRAM(s) IV Push every 8 hours PRN Nausea and/or Vomiting    Anticoagulation:  enoxaparin Injectable 40 milliGRAM(s) SubCutaneous <User Schedule>    Cardiology:  niMODipine 60 milliGRAM(s) Oral every 4 hours    Endo:   atorvastatin 40 milliGRAM(s) Oral at bedtime  insulin glargine Injectable (LANTUS) 20 Unit(s) SubCutaneous at bedtime  insulin lispro (ADMELOG) corrective regimen sliding scale   SubCutaneous three times a day before meals  insulin lispro (ADMELOG) corrective regimen sliding scale   SubCutaneous at bedtime  insulin lispro Injectable (ADMELOG) 12 Unit(s) SubCutaneous before breakfast  insulin lispro Injectable (ADMELOG) 10 Unit(s) SubCutaneous before dinner  insulin lispro Injectable (ADMELOG) 10 Unit(s) SubCutaneous before lunch    Pulm:    GI/:  bisacodyl 5 milliGRAM(s) Oral at bedtime  polyethylene glycol 3350 17 Gram(s) Oral every 12 hours  senna 2 Tablet(s) Oral at bedtime    Other:  silver sulfADIAZINE 1% Cream 1 Application(s) Topical two times a day  bisacodyl 5 milliGRAM(s) Oral at bedtime  polyethylene glycol 3350 17 Gram(s) Oral every 12 hours  senna 2 Tablet(s) Oral at bedtime

## 2024-05-12 NOTE — PROGRESS NOTE ADULT - PROBLEM SELECTOR PLAN 3
LDL goal <55 due to CAD/DM/CVA  Pt  in 5/4/24  Statin as noted above( Atorvastatin 40 mg daily )   Consider low cholesterol diet   Manage per primary team   F/u levels as out pt      Contact via [employer] Teams during business hours  To reach covering provider access AMION via [employer]  For Urgent matters/after-hours/weekends/holidays please page endocrine fellow on call   For nonurgent matters please email [email or web address]    Please note that this patient may be followed by different provider tomorrow.  Notify endocrine 24 hours prior to discharge for final recommendations

## 2024-05-13 LAB
ADD ON TEST-SPECIMEN IN LAB: SIGNIFICANT CHANGE UP
ANION GAP SERPL CALC-SCNC: 11 MMOL/L — SIGNIFICANT CHANGE UP (ref 5–17)
BILIRUB DIRECT SERPL-MCNC: <0.1 MG/DL — SIGNIFICANT CHANGE UP (ref 0–0.3)
BILIRUB INDIRECT FLD-MCNC: >0.1 MG/DL — LOW (ref 0.2–1)
BILIRUB SERPL-MCNC: 0.2 MG/DL — SIGNIFICANT CHANGE UP (ref 0.2–1.2)
BUN SERPL-MCNC: 14 MG/DL — SIGNIFICANT CHANGE UP (ref 7–23)
CALCIUM SERPL-MCNC: 9.8 MG/DL — SIGNIFICANT CHANGE UP (ref 8.4–10.5)
CHLORIDE SERPL-SCNC: 107 MMOL/L — SIGNIFICANT CHANGE UP (ref 96–108)
CO2 SERPL-SCNC: 24 MMOL/L — SIGNIFICANT CHANGE UP (ref 22–31)
CREAT SERPL-MCNC: 0.95 MG/DL — SIGNIFICANT CHANGE UP (ref 0.5–1.3)
EGFR: 72 ML/MIN/1.73M2 — SIGNIFICANT CHANGE UP
FERRITIN SERPL-MCNC: 329 NG/ML — SIGNIFICANT CHANGE UP (ref 13–330)
FOLATE SERPL-MCNC: 12.6 NG/ML — SIGNIFICANT CHANGE UP
GLUCOSE BLDC GLUCOMTR-MCNC: 153 MG/DL — HIGH (ref 70–99)
GLUCOSE BLDC GLUCOMTR-MCNC: 197 MG/DL — HIGH (ref 70–99)
GLUCOSE BLDC GLUCOMTR-MCNC: 71 MG/DL — SIGNIFICANT CHANGE UP (ref 70–99)
GLUCOSE BLDC GLUCOMTR-MCNC: 89 MG/DL — SIGNIFICANT CHANGE UP (ref 70–99)
GLUCOSE SERPL-MCNC: 63 MG/DL — LOW (ref 70–99)
HCT VFR BLD CALC: 28 % — LOW (ref 34.5–45)
HGB BLD-MCNC: 8.9 G/DL — LOW (ref 11.5–15.5)
IRON SATN MFR SERPL: 26 % — SIGNIFICANT CHANGE UP (ref 14–50)
IRON SATN MFR SERPL: 80 UG/DL — SIGNIFICANT CHANGE UP (ref 30–160)
MCHC RBC-ENTMCNC: 29.8 PG — SIGNIFICANT CHANGE UP (ref 27–34)
MCHC RBC-ENTMCNC: 31.8 GM/DL — LOW (ref 32–36)
MCV RBC AUTO: 93.6 FL — SIGNIFICANT CHANGE UP (ref 80–100)
NRBC # BLD: 0 /100 WBCS — SIGNIFICANT CHANGE UP (ref 0–0)
PLATELET # BLD AUTO: 438 K/UL — HIGH (ref 150–400)
POTASSIUM SERPL-MCNC: 3.5 MMOL/L — SIGNIFICANT CHANGE UP (ref 3.5–5.3)
POTASSIUM SERPL-SCNC: 3.5 MMOL/L — SIGNIFICANT CHANGE UP (ref 3.5–5.3)
RBC # BLD: 2.99 M/UL — LOW (ref 3.8–5.2)
RBC # FLD: 14.2 % — SIGNIFICANT CHANGE UP (ref 10.3–14.5)
SODIUM SERPL-SCNC: 142 MMOL/L — SIGNIFICANT CHANGE UP (ref 135–145)
TIBC SERPL-MCNC: 302 UG/DL — SIGNIFICANT CHANGE UP (ref 220–430)
UIBC SERPL-MCNC: 223 UG/DL — SIGNIFICANT CHANGE UP (ref 110–370)
VIT B12 SERPL-MCNC: 1027 PG/ML — SIGNIFICANT CHANGE UP (ref 232–1245)
WBC # BLD: 4.94 K/UL — SIGNIFICANT CHANGE UP (ref 3.8–10.5)
WBC # FLD AUTO: 4.94 K/UL — SIGNIFICANT CHANGE UP (ref 3.8–10.5)

## 2024-05-13 PROCEDURE — 99232 SBSQ HOSP IP/OBS MODERATE 35: CPT

## 2024-05-13 PROCEDURE — 99222 1ST HOSP IP/OBS MODERATE 55: CPT | Mod: GC

## 2024-05-13 PROCEDURE — 74176 CT ABD & PELVIS W/O CONTRAST: CPT | Mod: 26

## 2024-05-13 RX ORDER — INSULIN GLARGINE 100 [IU]/ML
16 INJECTION, SOLUTION SUBCUTANEOUS AT BEDTIME
Refills: 0 | Status: DISCONTINUED | OUTPATIENT
Start: 2024-05-13 | End: 2024-05-14

## 2024-05-13 RX ORDER — PANTOPRAZOLE SODIUM 20 MG/1
40 TABLET, DELAYED RELEASE ORAL EVERY 12 HOURS
Refills: 0 | Status: DISCONTINUED | OUTPATIENT
Start: 2024-05-13 | End: 2024-05-13

## 2024-05-13 RX ORDER — POTASSIUM CHLORIDE 20 MEQ
40 PACKET (EA) ORAL EVERY 4 HOURS
Refills: 0 | Status: COMPLETED | OUTPATIENT
Start: 2024-05-13 | End: 2024-05-13

## 2024-05-13 RX ORDER — PANTOPRAZOLE SODIUM 20 MG/1
40 TABLET, DELAYED RELEASE ORAL DAILY
Refills: 0 | Status: DISCONTINUED | OUTPATIENT
Start: 2024-05-13 | End: 2024-05-17

## 2024-05-13 RX ADMIN — PANTOPRAZOLE SODIUM 40 MILLIGRAM(S): 20 TABLET, DELAYED RELEASE ORAL at 16:34

## 2024-05-13 RX ADMIN — NIMODIPINE 60 MILLIGRAM(S): 60 SOLUTION ORAL at 08:08

## 2024-05-13 RX ADMIN — Medication 40 MILLIEQUIVALENT(S): at 12:52

## 2024-05-13 RX ADMIN — INSULIN GLARGINE 16 UNIT(S): 100 INJECTION, SOLUTION SUBCUTANEOUS at 22:01

## 2024-05-13 RX ADMIN — NIMODIPINE 60 MILLIGRAM(S): 60 SOLUTION ORAL at 22:00

## 2024-05-13 RX ADMIN — NIMODIPINE 60 MILLIGRAM(S): 60 SOLUTION ORAL at 16:37

## 2024-05-13 RX ADMIN — ENOXAPARIN SODIUM 40 MILLIGRAM(S): 100 INJECTION SUBCUTANEOUS at 16:37

## 2024-05-13 RX ADMIN — POLYETHYLENE GLYCOL 3350 17 GRAM(S): 17 POWDER, FOR SOLUTION ORAL at 06:16

## 2024-05-13 RX ADMIN — Medication 1: at 11:51

## 2024-05-13 RX ADMIN — NIMODIPINE 60 MILLIGRAM(S): 60 SOLUTION ORAL at 06:16

## 2024-05-13 RX ADMIN — Medication 40 MILLIEQUIVALENT(S): at 16:41

## 2024-05-13 RX ADMIN — Medication 10 UNIT(S): at 11:51

## 2024-05-13 RX ADMIN — Medication 12 UNIT(S): at 08:01

## 2024-05-13 RX ADMIN — SENNA PLUS 2 TABLET(S): 8.6 TABLET ORAL at 22:00

## 2024-05-13 RX ADMIN — ATORVASTATIN CALCIUM 40 MILLIGRAM(S): 80 TABLET, FILM COATED ORAL at 22:00

## 2024-05-13 RX ADMIN — Medication 10 UNIT(S): at 16:33

## 2024-05-13 RX ADMIN — Medication 5 MILLIGRAM(S): at 22:00

## 2024-05-13 RX ADMIN — Medication 1 APPLICATION(S): at 06:16

## 2024-05-13 RX ADMIN — POLYETHYLENE GLYCOL 3350 17 GRAM(S): 17 POWDER, FOR SOLUTION ORAL at 16:37

## 2024-05-13 RX ADMIN — NIMODIPINE 60 MILLIGRAM(S): 60 SOLUTION ORAL at 13:53

## 2024-05-13 RX ADMIN — NIMODIPINE 60 MILLIGRAM(S): 60 SOLUTION ORAL at 01:42

## 2024-05-13 NOTE — PROGRESS NOTE ADULT - SUBJECTIVE AND OBJECTIVE BOX
SUBJECTIVE: Patient seen and evaluated at bedside. well appearing in no acute distress. labs and vitals reviewed, h/h downtrending however remains hemodynamically patient stated to hospitalist she had intermittent episodes of "black" stool w/ constipation prior to this hospital admission. GI consulted for recs.     Vital Signs Last 24 Hrs  T(C): 37 (05-13-24 @ 12:32), Max: 37 (05-13-24 @ 12:32)  T(F): 98.6 (05-13-24 @ 12:32), Max: 98.6 (05-13-24 @ 12:32)  HR: 85 (05-13-24 @ 12:32) (80 - 100)  BP: 125/71 (05-13-24 @ 12:32) (118/72 - 138/85)  BP(mean): --  RR: 18 (05-13-24 @ 12:32) (18 - 18)  SpO2: 100% (05-13-24 @ 12:32) (98% - 100%)    PHYSICAL EXAM:  Constitutional: No Acute Distress   Neurological: awake/alert, oriented x3 (person, place and time), EOMI, PERRL 3 mm briskly reactive, no facial asymmetry, moving all extremities with 5/5 strength, no drift   Pulmonary: Clear lungs   Cardiovascular: Regular rate and rhythm   Gastrointestinal: Soft, Non-tender, Non-distended abdomen, +bowel sounds   Extremities: No calf tenderness bilaterally, no edema. R groin     LABS:                     8.9    4.94  )-----------( 438      ( 13 May 2024 07:14 )             28.0    05-13    142  |  107  |  14  ----------------------------<  63<L>  3.5   |  24  |  0.95  Ca    9.8      13 May 2024 07:14    05-12 @ 07:01  -  05-13 @ 07:00  --------------------------------------------------------  IN: 360 mL / OUT: 0 mL / NET: 360 mL    05-13 @ 07:01  -  05-13 @ 14:46  --------------------------------------------------------  IN: 680 mL / OUT: 0 mL / NET: 680 mL    IMAGING:   < from: CT Head No Cont (05.11.24 @ 09:32) >  ACC: [id number] EXAM:  CT BRAIN   ORDERED BY: INNA LUCAS   PROCEDURE DATE:  05/11/2024    INTERPRETATION:  CLINICAL INDICATION: Intracranial hemorrhage..  TECHNIQUE: CT of the head was performed without the administration of   intravenous contrast.  COMPARISON: MR brain 5/4/2024. CT head 5/4/2024.    FINDINGS:  Resolution of subarachnoid hemorrhage in the prepontine cistern. Minimal   trace subarachnoid hemorrhage is suggested in the left medial occipital   region (3-19). No new orworsening hemorrhage identified.  No hydrocephalus. No midline shift.  The visualized intraorbital contents are unremarkable. The imaged   portions of the paranasal sinuses are clear. The mastoid air cells are   clear. The visualized soft tissuesand osseous structures appear normal.    IMPRESSION:  Resolution of prepontine cistern subarachnoid hemorrhage. Minimal trace   subarachnoid hemorrhage is suggested in the left medial occipital region.   No new or worsening intracranial hemorrhage.    --- End of Report ---   SUNITA CRUZ MD; Resident Radiologist  This document has been electronically signed.  LAUREN DEXTER MD; Attending Radiologist  This document has been electronically signed. May 11 2024 11:55AM  < end of copied text >    MEDICATIONS  (STANDING):  atorvastatin 40 milliGRAM(s) Oral at bedtime  bisacodyl 5 milliGRAM(s) Oral at bedtime  enoxaparin Injectable 40 milliGRAM(s) SubCutaneous <User Schedule>  insulin glargine Injectable (LANTUS) 16 Unit(s) SubCutaneous at bedtime  insulin lispro (ADMELOG) corrective regimen sliding scale   SubCutaneous three times a day before meals  insulin lispro (ADMELOG) corrective regimen sliding scale   SubCutaneous at bedtime  insulin lispro Injectable (ADMELOG) 10 Unit(s) SubCutaneous before dinner  insulin lispro Injectable (ADMELOG) 12 Unit(s) SubCutaneous before breakfast  insulin lispro Injectable (ADMELOG) 10 Unit(s) SubCutaneous before lunch  niMODipine 60 milliGRAM(s) Oral every 4 hours  polyethylene glycol 3350 17 Gram(s) Oral every 12 hours  potassium chloride    Tablet ER 40 milliEquivalent(s) Oral every 4 hours  senna 2 Tablet(s) Oral at bedtime  silver sulfADIAZINE 1% Cream 1 Application(s) Topical two times a day    MEDICATIONS  (PRN):  acetaminophen     Tablet .. 650 milliGRAM(s) Oral every 6 hours PRN Temp greater or equal to 38.5C (101.3F), Mild Pain (1 - 3)  ondansetron Injectable 4 milliGRAM(s) IV Push every 8 hours PRN Nausea and/or Vomiting    DIET: CCD

## 2024-05-13 NOTE — PROGRESS NOTE ADULT - SUBJECTIVE AND OBJECTIVE BOX
DIABETES FOLLOW UP NOTE: Saw pt earlier today    Chief Complaint: Endocrine consult requested for management of DM    INTERVAL HX: Pt stable, tolerating POs with variable  PO intake making BGs also variable between 70s to 300s. No hypoglycemic symptoms reported. FBG 71 today by POC. Pt states she wants to take care of her diabetes by improving self care..       Review of Systems:  General: As above  Cardiovascular: No chest pain, palpitations  Respiratory: No SOB, no cough  GI: No nausea, vomiting, abdominal pain  Endocrine: No S&Sx of hypoglycemia    Allergies    No Known Allergies    Intolerances      MEDICATIONS:  atorvastatin 40 milliGRAM(s) Oral at bedtime  insulin glargine Injectable (LANTUS) 16 Unit(s) SubCutaneous at bedtime  insulin lispro (ADMELOG) corrective regimen sliding scale   SubCutaneous three times a day before meals  insulin lispro (ADMELOG) corrective regimen sliding scale   SubCutaneous at bedtime  insulin lispro Injectable (ADMELOG) 10 Unit(s) SubCutaneous before lunch  insulin lispro Injectable (ADMELOG) 10 Unit(s) SubCutaneous before dinner  insulin lispro Injectable (ADMELOG) 12 Unit(s) SubCutaneous before breakfast      PHYSICAL EXAM:  VITALS: T(C): 38.3 (05-13-24 @ 17:04)  T(F): 100.9 (05-13-24 @ 17:04), Max: 100.9 (05-13-24 @ 17:04)  HR: 86 (05-13-24 @ 17:04) (80 - 100)  BP: 162/- (05-13-24 @ 17:04) (118/72 - 162/-)  RR:  (18 - 18)  SpO2:  (98% - 100%)  Wt(kg): --  GENERAL: Female sitting in chair in NAD  Abdomen: Soft, nontender, non distended, central adiposity  Extremities: Warm, no edema in all 4 exts  NEURO: A&O X3    LABS:  POCT Blood Glucose.: 89 mg/dL (05-13-24 @ 16:19)  POCT Blood Glucose.: 153 mg/dL (05-13-24 @ 11:24)  POCT Blood Glucose.: 71 mg/dL (05-13-24 @ 07:43)  POCT Blood Glucose.: 193 mg/dL (05-12-24 @ 21:24)  POCT Blood Glucose.: 266 mg/dL (05-12-24 @ 17:16)  POCT Blood Glucose.: 322 mg/dL (05-12-24 @ 11:30)  POCT Blood Glucose.: 120 mg/dL (05-12-24 @ 08:06)  POCT Blood Glucose.: 280 mg/dL (05-11-24 @ 21:27)  POCT Blood Glucose.: 145 mg/dL (05-11-24 @ 16:33)  POCT Blood Glucose.: 88 mg/dL (05-11-24 @ 15:57)  POCT Blood Glucose.: 75 mg/dL (05-11-24 @ 15:41)  POCT Blood Glucose.: 73 mg/dL (05-11-24 @ 15:25)  POCT Blood Glucose.: 67 mg/dL (05-11-24 @ 15:23)  POCT Blood Glucose.: 151 mg/dL (05-11-24 @ 11:50)  POCT Blood Glucose.: 137 mg/dL (05-11-24 @ 07:29)  POCT Blood Glucose.: 121 mg/dL (05-11-24 @ 06:10)  POCT Blood Glucose.: 246 mg/dL (05-10-24 @ 21:20)                            8.9    4.94  )-----------( 438      ( 13 May 2024 07:14 )             28.0       05-13    142  |  107  |  14  ----------------------------<  63<L>  3.5   |  24  |  0.95    eGFR: 72    Ca    9.8      05-13      Thyroid Function Tests:  05-04 @ 02:59 TSH 2.44 FreeT4 1.1 T3 60 Anti TPO -- Anti Thyroglobulin Ab -- TSI --      A1C with Estimated Average Glucose Result: >15.5 % (05-06-24 @ 18:28)  A1C with Estimated Average Glucose Result: >15.5 % (05-04-24 @ 02:59)      Estimated Average Glucose: >398 mg/dL (05-06-24 @ 18:28)  Estimated Average Glucose: >398 mg/dL (05-04-24 @ 02:59)    Glutamic Acid Decarboxylase Antibody: 0.00 nmol/L (05-05-24 @ 05:01)  Islet Cell Antibody: <1:4 (05-05-24 @ 05:01)        05-04 Chol 389<H> Direct LDL -- LDL calculated 274<H> HDL 73 Trig 203<H>

## 2024-05-13 NOTE — PROGRESS NOTE ADULT - NSPROGADDITIONALINFOA_GEN_ALL_CORE
-Plan discussed with pt/team.  Contact info: [phone] (24/7). pager [phone]  Amion on [employer]  Teams on M-T-W-F. Unavailable Thu/Weekends/Holidays  Provided face to face education as well as assessed  pt/labs/meds and discussed plan with primary team  Adjusting insulin  Discharge plan  Follow up care

## 2024-05-13 NOTE — PROGRESS NOTE ADULT - PROBLEM SELECTOR PLAN 3
LDL goal <55 due to CAD/DM/CVA  Pt  in 5/4/24  Statin as noted above( Atorvastatin 40 mg daily )   Consider low cholesterol diet   Manage per primary team   F/u levels as out pt

## 2024-05-13 NOTE — PROGRESS NOTE ADULT - PROBLEM SELECTOR PLAN 1
Management per neurosurgery   Nimodipine per neurosurg. may need HTN med once nimodipine removed. can use Amlodipine until she follows up with PMD at which point she may need to be switched to ACE-I or ARB  BP stable.

## 2024-05-13 NOTE — PROGRESS NOTE ADULT - SUBJECTIVE AND OBJECTIVE BOX
Saurabh Ricketts   contact via pager or TEAMS        CC: Patient is a 53y old  Female who presents with a chief complaint of severe headache, SAH (12 May 2024 12:41)      SUBJECTIVE / OVERNIGHT EVENTS:    MEDICATIONS  (STANDING):  atorvastatin 40 milliGRAM(s) Oral at bedtime  bisacodyl 5 milliGRAM(s) Oral at bedtime  enoxaparin Injectable 40 milliGRAM(s) SubCutaneous <User Schedule>  insulin glargine Injectable (LANTUS) 16 Unit(s) SubCutaneous at bedtime  insulin lispro (ADMELOG) corrective regimen sliding scale   SubCutaneous at bedtime  insulin lispro (ADMELOG) corrective regimen sliding scale   SubCutaneous three times a day before meals  insulin lispro Injectable (ADMELOG) 10 Unit(s) SubCutaneous before lunch  insulin lispro Injectable (ADMELOG) 10 Unit(s) SubCutaneous before dinner  insulin lispro Injectable (ADMELOG) 12 Unit(s) SubCutaneous before breakfast  niMODipine 60 milliGRAM(s) Oral every 4 hours  polyethylene glycol 3350 17 Gram(s) Oral every 12 hours  senna 2 Tablet(s) Oral at bedtime  silver sulfADIAZINE 1% Cream 1 Application(s) Topical two times a day    MEDICATIONS  (PRN):  acetaminophen     Tablet .. 650 milliGRAM(s) Oral every 6 hours PRN Temp greater or equal to 38.5C (101.3F), Mild Pain (1 - 3)  ondansetron Injectable 4 milliGRAM(s) IV Push every 8 hours PRN Nausea and/or Vomiting      Vital Signs Last 24 Hrs  T(C): 36.8 (13 May 2024 09:03), Max: 36.9 (12 May 2024 12:00)  T(F): 98.3 (13 May 2024 09:03), Max: 98.4 (12 May 2024 12:00)  HR: 83 (13 May 2024 09:03) (80 - 100)  BP: 119/73 (13 May 2024 09:03) (118/72 - 138/85)  BP(mean): --  RR: 18 (13 May 2024 09:03) (18 - 18)  SpO2: 100% (13 May 2024 09:03) (98% - 100%)  CAPILLARY BLOOD GLUCOSE      POCT Blood Glucose.: 71 mg/dL (13 May 2024 07:43)  POCT Blood Glucose.: 193 mg/dL (12 May 2024 21:24)  POCT Blood Glucose.: 266 mg/dL (12 May 2024 17:16)  POCT Blood Glucose.: 322 mg/dL (12 May 2024 11:30)    I&O's Summary    12 May 2024 07:01  -  13 May 2024 07:00  --------------------------------------------------------  IN: 360 mL / OUT: 0 mL / NET: 360 mL      tele:    PHYSICAL EXAM:    GENERAL: NAD   HEENT: EOMI, PERRL  PULM: Clear to auscultation bilaterally  CV: Regular rate and rhythm; nl S1, S2; No murmurs, rubs, or gallops  ABDOMEN: Soft, Nontender, Nondistended; Bowel sounds present  EXTREMITIES/MSK:  No edema, calf tenderness   PSYCH: AAOx3  NEUROLOGY: non-focal          LABS:                        8.9    4.94  )-----------( 438      ( 13 May 2024 07:14 )             28.0     05-13    142  |  107  |  14  ----------------------------<  63<L>  3.5   |  24  |  0.95    Ca    9.8      13 May 2024 07:14            Urinalysis Basic - ( 13 May 2024 07:14 )    Color: x / Appearance: x / SG: x / pH: x  Gluc: 63 mg/dL / Ketone: x  / Bili: x / Urobili: x   Blood: x / Protein: x / Nitrite: x   Leuk Esterase: x / RBC: x / WBC x   Sq Epi: x / Non Sq Epi: x / Bacteria: x          RADIOLOGY & ADDITIONAL TESTS:    Imaging Personally Reviewed:    Consultant(s) Notes Reviewed:      Care Discussed with Consultants/Other Providers:   Saurabh Ricketts   contact via pager or TEAMS        CC: Patient is a 53y old  Female who presents with a chief complaint of severe headache, SAH (12 May 2024 12:41)      SUBJECTIVE / OVERNIGHT EVENTS: denies any complaints on ROS. reports black stools, intermittently, for a few days, the week prior to admission    MEDICATIONS  (STANDING):  atorvastatin 40 milliGRAM(s) Oral at bedtime  bisacodyl 5 milliGRAM(s) Oral at bedtime  enoxaparin Injectable 40 milliGRAM(s) SubCutaneous <User Schedule>  insulin glargine Injectable (LANTUS) 16 Unit(s) SubCutaneous at bedtime  insulin lispro (ADMELOG) corrective regimen sliding scale   SubCutaneous at bedtime  insulin lispro (ADMELOG) corrective regimen sliding scale   SubCutaneous three times a day before meals  insulin lispro Injectable (ADMELOG) 10 Unit(s) SubCutaneous before lunch  insulin lispro Injectable (ADMELOG) 10 Unit(s) SubCutaneous before dinner  insulin lispro Injectable (ADMELOG) 12 Unit(s) SubCutaneous before breakfast  niMODipine 60 milliGRAM(s) Oral every 4 hours  polyethylene glycol 3350 17 Gram(s) Oral every 12 hours  senna 2 Tablet(s) Oral at bedtime  silver sulfADIAZINE 1% Cream 1 Application(s) Topical two times a day    MEDICATIONS  (PRN):  acetaminophen     Tablet .. 650 milliGRAM(s) Oral every 6 hours PRN Temp greater or equal to 38.5C (101.3F), Mild Pain (1 - 3)  ondansetron Injectable 4 milliGRAM(s) IV Push every 8 hours PRN Nausea and/or Vomiting      Vital Signs Last 24 Hrs  T(C): 36.8 (13 May 2024 09:03), Max: 36.9 (12 May 2024 12:00)  T(F): 98.3 (13 May 2024 09:03), Max: 98.4 (12 May 2024 12:00)  HR: 83 (13 May 2024 09:03) (80 - 100)  BP: 119/73 (13 May 2024 09:03) (118/72 - 138/85)  BP(mean): --  RR: 18 (13 May 2024 09:03) (18 - 18)  SpO2: 100% (13 May 2024 09:03) (98% - 100%)  CAPILLARY BLOOD GLUCOSE      POCT Blood Glucose.: 71 mg/dL (13 May 2024 07:43)  POCT Blood Glucose.: 193 mg/dL (12 May 2024 21:24)  POCT Blood Glucose.: 266 mg/dL (12 May 2024 17:16)  POCT Blood Glucose.: 322 mg/dL (12 May 2024 11:30)    I&O's Summary    12 May 2024 07:01  -  13 May 2024 07:00  --------------------------------------------------------  IN: 360 mL / OUT: 0 mL / NET: 360 mL          PHYSICAL EXAM: chaparoned by ONEIDA Carroll    GENERAL: NAD   HEENT: EOMI, PERRL  PULM: Clear to auscultation bilaterally  CV: Regular rate and rhythm; nl S1, S2; No murmurs, rubs, or gallops  ABDOMEN: Soft, Nontender, Nondistended; Bowel sounds present  RECTAL: light brown stool  EXTREMITIES/MSK:  No edema, calf tenderness   PSYCH: AAOx3  NEUROLOGY: non-focal  SKIN: no ecchymoses          LABS:                        8.9    4.94  )-----------( 438      ( 13 May 2024 07:14 )             28.0     05-13    142  |  107  |  14  ----------------------------<  63<L>  3.5   |  24  |  0.95    Ca    9.8      13 May 2024 07:14            Urinalysis Basic - ( 13 May 2024 07:14 )    Color: x / Appearance: x / SG: x / pH: x  Gluc: 63 mg/dL / Ketone: x  / Bili: x / Urobili: x   Blood: x / Protein: x / Nitrite: x   Leuk Esterase: x / RBC: x / WBC x   Sq Epi: x / Non Sq Epi: x / Bacteria: x          RADIOLOGY & ADDITIONAL TESTS:    Imaging Personally Reviewed:    Consultant(s) Notes Reviewed:      Care Discussed with Consultants/Other Providers: neurosurg

## 2024-05-13 NOTE — CONSULT NOTE ADULT - SUBJECTIVE AND OBJECTIVE BOX
Chief Complaint:  Patient is a 53y old  Female who presents with a chief complaint of severe headache, SAH (13 May 2024 14:46)      HPI: 53F pmh diabetes, p/w severe sudden-onset bifrontal HA with associated neck stiffness/blurry vision. Patient was found to have SAH on imaging and admitted to NSCU for protocol. Went for angio 5/4 which was negative. Course was c/b Hgb drop following angio on 5/4 from Hgb 11 to Hgb 9 (Hgb BL 12), DKA, and UTI now s/p x3 days ceftriaxone. Pt reported few days of intermittent dark stools PTA but stools since admission have been brown. Has never had an EGD/colonoscopy before. Denies any weight loss, night sweats, N/V/D. Denies any recent NSAID use. GI consulted for mild anemia following angio, dark stools PTA.    Allergies:  No Known Allergies      Home Medications:    Hospital Medications:  acetaminophen     Tablet .. 650 milliGRAM(s) Oral every 6 hours PRN  atorvastatin 40 milliGRAM(s) Oral at bedtime  bisacodyl 5 milliGRAM(s) Oral at bedtime  enoxaparin Injectable 40 milliGRAM(s) SubCutaneous <User Schedule>  insulin glargine Injectable (LANTUS) 16 Unit(s) SubCutaneous at bedtime  insulin lispro (ADMELOG) corrective regimen sliding scale   SubCutaneous three times a day before meals  insulin lispro (ADMELOG) corrective regimen sliding scale   SubCutaneous at bedtime  insulin lispro Injectable (ADMELOG) 10 Unit(s) SubCutaneous before lunch  insulin lispro Injectable (ADMELOG) 10 Unit(s) SubCutaneous before dinner  insulin lispro Injectable (ADMELOG) 12 Unit(s) SubCutaneous before breakfast  niMODipine 60 milliGRAM(s) Oral every 4 hours  ondansetron Injectable 4 milliGRAM(s) IV Push every 8 hours PRN  polyethylene glycol 3350 17 Gram(s) Oral every 12 hours  potassium chloride    Tablet ER 40 milliEquivalent(s) Oral every 4 hours  senna 2 Tablet(s) Oral at bedtime  silver sulfADIAZINE 1% Cream 1 Application(s) Topical two times a day      PMHX/PSHX:  No pertinent past medical history    No significant past surgical history        Family history:  Family history of diabetes mellitus type I (Mother)     Denies any family history of GI-related disease or cancers.    Social History:   ETOH: denies  Tobacco: denies  Illicit drug use: denies    ROS: 14 point ROS negative unless otherwise stated in HPI      Vital Signs:  Vital Signs Last 24 Hrs  T(C): 37 (13 May 2024 12:32), Max: 37 (13 May 2024 12:32)  T(F): 98.6 (13 May 2024 12:32), Max: 98.6 (13 May 2024 12:32)  HR: 85 (13 May 2024 12:32) (80 - 100)  BP: 125/71 (13 May 2024 12:32) (118/72 - 138/85)  BP(mean): --  RR: 18 (13 May 2024 12:32) (18 - 18)  SpO2: 100% (13 May 2024 12:32) (98% - 100%)    Parameters below as of 13 May 2024 12:32  Patient On (Oxygen Delivery Method): room air      Daily     Daily     PHYSICAL EXAM:     GENERAL:  Appears stated age, well-groomed, well-nourished, no distress  HEENT:  NC/AT,  conjunctivae clear and pink  CHEST:  Full & symmetric excursion, no increased effort  HEART:  Regular rhythm, S1, S2, no murmur/rub/S3/S4  ABDOMEN:  Soft, non-tender, non-distended  RECTAL: +no external or internal hemorrhoids; +brown stool on smear; +chaperoned  EXTREMITIES:  no cyanosis, clubbing or edema  SKIN:  No rash/erythema/ecchymoses/petechiae; no hematomas seen  NEURO:  Alert, orientedx3      LABS:                        8.9    4.94  )-----------( 438      ( 13 May 2024 07:14 )             28.0     05-13    142  |  107  |  14  ----------------------------<  63<L>  3.5   |  24  |  0.95    Ca    9.8      13 May 2024 07:14          Urinalysis Basic - ( 13 May 2024 07:14 )    Color: x / Appearance: x / SG: x / pH: x  Gluc: 63 mg/dL / Ketone: x  / Bili: x / Urobili: x   Blood: x / Protein: x / Nitrite: x   Leuk Esterase: x / RBC: x / WBC x   Sq Epi: x / Non Sq Epi: x / Bacteria: x          Imaging: No new abdominal imaging              Chief Complaint:  Patient is a 53y old  Female who presents with a chief complaint of severe headache, SAH (13 May 2024 14:46)      HPI: 53F pmh diabetes, p/w severe sudden-onset bifrontal HA with associated neck stiffness/blurry vision. Patient was found to have SAH on imaging and admitted to NSCU for protocol. Went for angio 5/4 which was negative. Course was c/b Hgb drop following angio on 5/4 from Hgb 11 to Hgb 9 (Hgb BL 12), DKA, and UTI now s/p x3 days ceftriaxone. Pt reported few days of intermittent dark stools PTA at home but stools since admission have been brown. Has never had an EGD/colonoscopy before. Denies any weight loss, night sweats, N/V/D. Denies any recent NSAID use. GI consulted for mild anemia following angio, dark stools PTA. Denies recent iron pills or pepto bismol.    Allergies:  No Known Allergies      Home Medications:    Hospital Medications:  acetaminophen     Tablet .. 650 milliGRAM(s) Oral every 6 hours PRN  atorvastatin 40 milliGRAM(s) Oral at bedtime  bisacodyl 5 milliGRAM(s) Oral at bedtime  enoxaparin Injectable 40 milliGRAM(s) SubCutaneous <User Schedule>  insulin glargine Injectable (LANTUS) 16 Unit(s) SubCutaneous at bedtime  insulin lispro (ADMELOG) corrective regimen sliding scale   SubCutaneous three times a day before meals  insulin lispro (ADMELOG) corrective regimen sliding scale   SubCutaneous at bedtime  insulin lispro Injectable (ADMELOG) 10 Unit(s) SubCutaneous before lunch  insulin lispro Injectable (ADMELOG) 10 Unit(s) SubCutaneous before dinner  insulin lispro Injectable (ADMELOG) 12 Unit(s) SubCutaneous before breakfast  niMODipine 60 milliGRAM(s) Oral every 4 hours  ondansetron Injectable 4 milliGRAM(s) IV Push every 8 hours PRN  polyethylene glycol 3350 17 Gram(s) Oral every 12 hours  potassium chloride    Tablet ER 40 milliEquivalent(s) Oral every 4 hours  senna 2 Tablet(s) Oral at bedtime  silver sulfADIAZINE 1% Cream 1 Application(s) Topical two times a day      PMHX/PSHX:  No pertinent past medical history    No significant past surgical history        Family history:  Family history of diabetes mellitus type I (Mother)     Denies any family history of GI-related disease or cancers.    Social History:   ETOH: denies  Tobacco: denies  Illicit drug use: denies    ROS: 14 point ROS negative unless otherwise stated in HPI      Vital Signs:  Vital Signs Last 24 Hrs  T(C): 37 (13 May 2024 12:32), Max: 37 (13 May 2024 12:32)  T(F): 98.6 (13 May 2024 12:32), Max: 98.6 (13 May 2024 12:32)  HR: 85 (13 May 2024 12:32) (80 - 100)  BP: 125/71 (13 May 2024 12:32) (118/72 - 138/85)  BP(mean): --  RR: 18 (13 May 2024 12:32) (18 - 18)  SpO2: 100% (13 May 2024 12:32) (98% - 100%)    Parameters below as of 13 May 2024 12:32  Patient On (Oxygen Delivery Method): room air      Daily     Daily     PHYSICAL EXAM:     GENERAL:  Appears stated age, well-groomed, well-nourished, no distress  HEENT:  NC/AT,  conjunctivae clear and pink  CHEST:  Full & symmetric excursion, no increased effort  HEART:  Regular rhythm, S1, S2, no murmur/rub/S3/S4  ABDOMEN:  Soft, non-tender, non-distended  RECTAL: +no external or internal hemorrhoids; +brown stool on smear; +chaperoned  EXTREMITIES:  no cyanosis, clubbing or edema  SKIN:  No rash/erythema/ecchymoses/petechiae; no hematomas seen  NEURO:  Alert, orientedx3      LABS:                        8.9    4.94  )-----------( 438      ( 13 May 2024 07:14 )             28.0     05-13    142  |  107  |  14  ----------------------------<  63<L>  3.5   |  24  |  0.95    Ca    9.8      13 May 2024 07:14          Urinalysis Basic - ( 13 May 2024 07:14 )    Color: x / Appearance: x / SG: x / pH: x  Gluc: 63 mg/dL / Ketone: x  / Bili: x / Urobili: x   Blood: x / Protein: x / Nitrite: x   Leuk Esterase: x / RBC: x / WBC x   Sq Epi: x / Non Sq Epi: x / Bacteria: x          Imaging: No new abdominal imaging

## 2024-05-13 NOTE — PROGRESS NOTE ADULT - PROBLEM SELECTOR PLAN 1
-Test BG ac and hs. Q6H while NPO  - Changed Lantus to 16 units QHS  - Continue Admelog 12-10-10 units with meals for now Hold if NPO   - Continue low Admelog correction scale ACTID and at HS  - Please teach patient, family and neighbor friend insulin pen use and glucose check   - Discharge planning:   Needs to c/w basal bolus insulin since pt was in DKA. Doses TBD  She will need supervision or assistance for all injections on her discharge for safety   Please make sure all family and neighbor friends who are planning to assist patient on insulin injections, learn insulin pen use and glucose check   Please arrange home care   Make sure pt has Rx for all DM supplies and insulin> send RX to [facility] for Novolog insulin pen. If not covered by insurance replace it with any other rapid acting analog  Can follow at [facility] practice. [address]. Phone . Email sent on 5/10   Needs Optho f/u as out pt -Test BG ac and hs. Q6H while NPO  - Changed Lantus to 16 units QHS  - Continue Admelog 12-10-10 units with meals for now Hold if NPO   - Continue low Admelog correction scale ACTID and at HS  - Please teach patient, family and neighbor friend insulin pen use and glucose check   - Discharge planning:   Needs to c/w basal bolus insulin since pt was in DKA. Doses TBD  She will need supervision or assistance for all injections on her discharge for safety   Please make sure all family and neighbor friends who are planning to assist patient on insulin injections, learn insulin pen use and glucose check   Please arrange home care   Make sure pt has Rx for all DM supplies and insulin> send RX to [facility] for Novolog insulin pen. If not covered by insurance replace it with any other rapid acting analog.   -Prescribe a magnifier so pt can see numbers on insulin pens  Can follow at endo practice. [address]. Phone . Email sent on 5/10   Needs Optho f/u as out pt

## 2024-05-13 NOTE — CONSULT NOTE ADULT - ASSESSMENT
53F pmh diabetes, p/w severe sudden-onset bifrontal HA with associated neck stiffness/blurry vision. Patient was found to have SAH on imaging and admitted to NSCU for protocol. Went for angio 5/4 which was negative. Course was c/b Hgb drop following angio on 5/4 from Hgb 11 to Hgb 9 (Hgb BL 12), DKA, and UTI now s/p x3 days ceftriaxone. Pt reported few days of intermittent dark stools PTA but stools since admission have been brown. Has never had an EGD/colonoscopy before. Denies any weight loss, night sweats, N/V/D. GI consulted for mild anemia following angio, dark stools PTA.    #mild normocytic anemia  #intermittent dark stools- now resolved  Patient presents with mild normocytic anemia following angio 5/4/24 and had reported few days of dark stools PTA. Currently, Hgb stable at 9 and rectal exam 5/14 with brown stool. No NSAID use.    Recommendations:  -trend vitals, CBC, and monitor for clinical signs of bleeding  -maintain active type and screen  -transfusion goal to maintain hemoglobin >/= 7.0  -avoid NSAIDs  -can start PPI if pt develops worsening dyspepsia sx  -would benefit from age-appropriate colon cancer screening as an OP    We will sign off at this time. Please reconsult/page if questions.       **THIS NOTE IS NOT FINALIZED UNTIL SIGNED BY THE ATTENDING**    Marichuy Novak MD  GI Fellow, PGY-6  Available via [employer] Teams    NON-URGENT CONSULTS:  Please email [email or web address]  AT NIGHT AND ON WEEKENDS:  Contact on-call GI fellow via answering service ([phone]) from 5pm-8am and on weekends/holidays  MONDAY-FRIDAY 8AM-5PM:  Pager# [phone] ([facility]) or [phone] ([facility])  
  A/P: 53F, no AC/AP, pmh diabetes, p/w severe sudden-onset bifrontal HA this AM with associated neck stiffness/blurry vision now admitted with SAH. Endocrinology was consulted for management of diabetes mellitus.    #Type 2 Diabetes Mellitus  #DKA   - HbA1c - ordered and pending. None done recently    ; home regimen: Novolin 14-20 units BID   -egfr: 91  - Patient presented with BG in the 300s with DKA- AG 20, bicarb 18, BHB 7.3  - Blood sugars continue to remain in the 200s on in the insulin gtt currently running at 2u/hr   Plan:  - Continue with insulin gtt at this time per DKA protocol   - Once blood sugars are stabilized, will transition patient to a basal/bolus regimen  - Given patient's body habitus and presentation in DKA, would like to rule out RIYA- please order Glutamic acid decarboxylase 65 antibodies, islet cell antibodies and ZnT8 antibodies.   - Please check FSG before meals and QHS, or q6h while NPO  - Inpatient glucose goal 140-180 in the ICU setting   - Please keep patient on a diabetic, carb controlled diet once eating   - hypoglycemia orderset prn    - Discharge planning: Likely basal bolus- doses TBD    #Hypertension  - SBP goal <140 in the setting of SAH  - Management as per primary team    #Hyperlipidemia  - check fasting lipid panel    Patient is high risk and high level decision making, requiring ICU level of care.    Spoke with primary team regarding plan.     Elena Rangel DO  Attending Physician   Department of Endocrinology, Diabetes and Metabolism     If before 9AM or after 5PM, or on weekends/holidays, please call the Endocrine answering service for assistance ([phone]).  For nonurgent matters, please email [email or web address] for assistance.     Please note that a different provider may be following this patient each day.         
52 yo F PMH uncontrolled DMII who presented with severe HA, found to have SAH. s/p NSCU stay for monitoring, course c/b DKA for which endocrine is following, and UTI now s/p x3 days ceftriaxone (5/7-).      PCP Dr. Fay Wilson

## 2024-05-13 NOTE — PROGRESS NOTE ADULT - ASSESSMENT
53F w/h/o uncontrolled T2DM (A1C >15%) on basal/bolus insulin therapy with poor adherence. DM c/b CAD/MI/CVA. Also HTN/HLD. Here with severe sudden-onset bifrontal HA with associated neck stiffness/blurry vision >  admitted with SAH/DKA (AG 20, bicarb 18, BHB 7.3)/UTI. Endocrinology was consulted for management of diabetes mellitus. Tolerating POs with on/off hypo/hyperglycemia due to variable and unpredictable PO intake. FBG <100s, decreased basal insulin. Unable to adjust meal time insulin due to lack of glycemic patterns. BG 89 ac dinner today and 266 yesterday at the same time with same isulin doses. Will follow and adjust insulin if patterns are identified.   Noted antibodies for RIYA are negative. Pt with T2DM with insulin deficiency 2/2 long standing uncontrolledness DM    Spoke to pt about the following:  -Diet: Including carb basics, portion control and consistent PO intake  -Patient has poor vision. Pt reports her vision is getting worse so  was setting up Basaglar insulin pen but pt was nt taking it consistently. Pt agreeable to learn how to use insulin pens by counting clicks and  can set up pen for basal insulin every day.

## 2024-05-13 NOTE — CONSULT NOTE ADULT - ATTENDING COMMENTS
Agree with above. Patient reported dark stools at home but  has since resolved. Rectal exam now at bedside shows brown stools. H/H is stable in 8's. No signs of active GI bleeding at this time. She reports reflux at home, but no alarm symptoms, no dysphagia, no odynopahgia, no weight loss. No plans for acute GI intervention at this time. Would check iron studies, and have patient follow-up for EGD/colonoscopy as outpatient once acute neurologic issues have resolved.

## 2024-05-14 DIAGNOSIS — Q45.3 OTHER CONGENITAL MALFORMATIONS OF PANCREAS AND PANCREATIC DUCT: ICD-10-CM

## 2024-05-14 DIAGNOSIS — R50.9 FEVER, UNSPECIFIED: ICD-10-CM

## 2024-05-14 LAB
BASOPHILS # BLD AUTO: 0.06 K/UL — SIGNIFICANT CHANGE UP (ref 0–0.2)
BASOPHILS NFR BLD AUTO: 1.1 % — SIGNIFICANT CHANGE UP (ref 0–2)
EOSINOPHIL # BLD AUTO: 0.08 K/UL — SIGNIFICANT CHANGE UP (ref 0–0.5)
EOSINOPHIL NFR BLD AUTO: 1.5 % — SIGNIFICANT CHANGE UP (ref 0–6)
GLUCOSE BLDC GLUCOMTR-MCNC: 127 MG/DL — HIGH (ref 70–99)
GLUCOSE BLDC GLUCOMTR-MCNC: 137 MG/DL — HIGH (ref 70–99)
GLUCOSE BLDC GLUCOMTR-MCNC: 149 MG/DL — HIGH (ref 70–99)
GLUCOSE BLDC GLUCOMTR-MCNC: 155 MG/DL — HIGH (ref 70–99)
GLUCOSE BLDC GLUCOMTR-MCNC: 73 MG/DL — SIGNIFICANT CHANGE UP (ref 70–99)
HAPTOGLOB SERPL-MCNC: <20 MG/DL — LOW (ref 34–200)
HCT VFR BLD CALC: 29.9 % — LOW (ref 34.5–45)
HGB BLD-MCNC: 9.4 G/DL — LOW (ref 11.5–15.5)
IMM GRANULOCYTES NFR BLD AUTO: 0.6 % — SIGNIFICANT CHANGE UP (ref 0–0.9)
LIDOCAIN IGE QN: 38 U/L — SIGNIFICANT CHANGE UP (ref 7–60)
LYMPHOCYTES # BLD AUTO: 1.65 K/UL — SIGNIFICANT CHANGE UP (ref 1–3.3)
LYMPHOCYTES # BLD AUTO: 31.4 % — SIGNIFICANT CHANGE UP (ref 13–44)
MCHC RBC-ENTMCNC: 29.4 PG — SIGNIFICANT CHANGE UP (ref 27–34)
MCHC RBC-ENTMCNC: 31.4 GM/DL — LOW (ref 32–36)
MCV RBC AUTO: 93.4 FL — SIGNIFICANT CHANGE UP (ref 80–100)
MONOCYTES # BLD AUTO: 0.45 K/UL — SIGNIFICANT CHANGE UP (ref 0–0.9)
MONOCYTES NFR BLD AUTO: 8.6 % — SIGNIFICANT CHANGE UP (ref 2–14)
NEUTROPHILS # BLD AUTO: 2.99 K/UL — SIGNIFICANT CHANGE UP (ref 1.8–7.4)
NEUTROPHILS NFR BLD AUTO: 56.8 % — SIGNIFICANT CHANGE UP (ref 43–77)
NRBC # BLD: 0 /100 WBCS — SIGNIFICANT CHANGE UP (ref 0–0)
PLATELET # BLD AUTO: 512 K/UL — HIGH (ref 150–400)
RBC # BLD: 3.2 M/UL — LOW (ref 3.8–5.2)
RBC # FLD: 14.8 % — HIGH (ref 10.3–14.5)
WBC # BLD: 5.26 K/UL — SIGNIFICANT CHANGE UP (ref 3.8–10.5)
WBC # FLD AUTO: 5.26 K/UL — SIGNIFICANT CHANGE UP (ref 3.8–10.5)
ZINC TRANSPORTER 8 AB, RESULT: <15 U/ML — SIGNIFICANT CHANGE UP

## 2024-05-14 PROCEDURE — 99232 SBSQ HOSP IP/OBS MODERATE 35: CPT

## 2024-05-14 PROCEDURE — 99233 SBSQ HOSP IP/OBS HIGH 50: CPT

## 2024-05-14 RX ORDER — INSULIN GLARGINE 100 [IU]/ML
14 INJECTION, SOLUTION SUBCUTANEOUS AT BEDTIME
Refills: 0 | Status: DISCONTINUED | OUTPATIENT
Start: 2024-05-14 | End: 2024-05-16

## 2024-05-14 RX ORDER — AMLODIPINE BESYLATE 2.5 MG/1
2.5 TABLET ORAL AT BEDTIME
Refills: 0 | Status: DISCONTINUED | OUTPATIENT
Start: 2024-05-14 | End: 2024-05-17

## 2024-05-14 RX ORDER — LANOLIN ALCOHOL/MO/W.PET/CERES
3 CREAM (GRAM) TOPICAL AT BEDTIME
Refills: 0 | Status: DISCONTINUED | OUTPATIENT
Start: 2024-05-14 | End: 2024-05-17

## 2024-05-14 RX ORDER — INSULIN LISPRO 100/ML
8 VIAL (ML) SUBCUTANEOUS
Refills: 0 | Status: DISCONTINUED | OUTPATIENT
Start: 2024-05-14 | End: 2024-05-16

## 2024-05-14 RX ADMIN — NIMODIPINE 60 MILLIGRAM(S): 60 SOLUTION ORAL at 10:08

## 2024-05-14 RX ADMIN — ATORVASTATIN CALCIUM 40 MILLIGRAM(S): 80 TABLET, FILM COATED ORAL at 21:43

## 2024-05-14 RX ADMIN — PANTOPRAZOLE SODIUM 40 MILLIGRAM(S): 20 TABLET, DELAYED RELEASE ORAL at 12:47

## 2024-05-14 RX ADMIN — POLYETHYLENE GLYCOL 3350 17 GRAM(S): 17 POWDER, FOR SOLUTION ORAL at 17:11

## 2024-05-14 RX ADMIN — Medication 8 UNIT(S): at 12:47

## 2024-05-14 RX ADMIN — NIMODIPINE 60 MILLIGRAM(S): 60 SOLUTION ORAL at 13:57

## 2024-05-14 RX ADMIN — Medication 8 UNIT(S): at 16:51

## 2024-05-14 RX ADMIN — NIMODIPINE 60 MILLIGRAM(S): 60 SOLUTION ORAL at 05:08

## 2024-05-14 RX ADMIN — Medication 3 MILLIGRAM(S): at 23:35

## 2024-05-14 RX ADMIN — NIMODIPINE 60 MILLIGRAM(S): 60 SOLUTION ORAL at 01:04

## 2024-05-14 RX ADMIN — Medication 1 APPLICATION(S): at 17:12

## 2024-05-14 RX ADMIN — AMLODIPINE BESYLATE 2.5 MILLIGRAM(S): 2.5 TABLET ORAL at 20:47

## 2024-05-14 RX ADMIN — INSULIN GLARGINE 14 UNIT(S): 100 INJECTION, SOLUTION SUBCUTANEOUS at 21:34

## 2024-05-14 RX ADMIN — POLYETHYLENE GLYCOL 3350 17 GRAM(S): 17 POWDER, FOR SOLUTION ORAL at 05:08

## 2024-05-14 RX ADMIN — Medication 12 UNIT(S): at 08:32

## 2024-05-14 RX ADMIN — Medication 650 MILLIGRAM(S): at 14:27

## 2024-05-14 RX ADMIN — Medication 1: at 16:51

## 2024-05-14 RX ADMIN — Medication 5 MILLIGRAM(S): at 21:43

## 2024-05-14 RX ADMIN — Medication 650 MILLIGRAM(S): at 13:57

## 2024-05-14 RX ADMIN — ENOXAPARIN SODIUM 40 MILLIGRAM(S): 100 INJECTION SUBCUTANEOUS at 16:55

## 2024-05-14 RX ADMIN — SENNA PLUS 2 TABLET(S): 8.6 TABLET ORAL at 21:43

## 2024-05-14 NOTE — PROGRESS NOTE ADULT - SUBJECTIVE AND OBJECTIVE BOX
SUBJECTIVE:   Patient seen & examined. intermittent mild headaches. Ambulated w PT   OVERNIGHT EVENTS: none    Vital Signs Last 24 Hrs  T(C): 37.2 (14 May 2024 12:00), Max: 38.3 (13 May 2024 17:04)  T(F): 98.9 (14 May 2024 12:00), Max: 100.9 (13 May 2024 17:04)  HR: 89 (14 May 2024 12:00) (77 - 89)  BP: 141/85 (14 May 2024 12:00) (125/75 - 167/81)  BP(mean): --  RR: 18 (14 May 2024 12:00) (18 - 18)  SpO2: 100% (14 May 2024 12:00) (99% - 100%)    Parameters below as of 14 May 2024 12:00  Patient On (Oxygen Delivery Method): room air        PHYSICAL EXAM:      Neurological: Awake alert Ox3,Speech clear  Following Commands, Moving all Extremities 5/5 No drift. BERNARD EOMI Can finger count bilaterally. Unable to read either eye      Pulmonary: Clear to Auscultation,     Cardiovascular: S1, S2, Regular rate and rhythm     Gastrointestinal: Soft, Non-tender, Non-distended     LABS:                        9.4    5.26  )-----------( 512      ( 14 May 2024 12:57 )             29.9    05-13    142  |  107  |  14  ----------------------------<  63<L>  3.5   |  24  |  0.95    Ca    9.8      13 May 2024 07:14    TPro  x   /  Alb  x   /  TBili  0.2  /  DBili  <0.1  /  AST  x   /  ALT  x   /  AlkPhos  x   05-13          IMAGING:         MEDICATIONS:    acetaminophen     Tablet .. 650 milliGRAM(s) Oral every 6 hours PRN Temp greater or equal to 38.5C (101.3F), Mild Pain (1 - 3)  bisacodyl 5 milliGRAM(s) Oral at bedtime  pantoprazole    Tablet 40 milliGRAM(s) Oral daily  polyethylene glycol 3350 17 Gram(s) Oral every 12 hours  senna 2 Tablet(s) Oral at bedtime  atorvastatin 40 milliGRAM(s) Oral at bedtime  enoxaparin Injectable 40 milliGRAM(s) SubCutaneous <User Schedule>  insulin glargine Injectable (LANTUS) 14 Unit(s) SubCutaneous at bedtime  insulin lispro (ADMELOG) corrective regimen sliding scale   SubCutaneous three times a day before meals  insulin lispro (ADMELOG) corrective regimen sliding scale   SubCutaneous at bedtime  insulin lispro Injectable (ADMELOG) 8 Unit(s) SubCutaneous three times a day with meals  silver sulfADIAZINE 1% Cream 1 Application(s) Topical two times a day      DIET:

## 2024-05-14 NOTE — PROGRESS NOTE ADULT - SUBJECTIVE AND OBJECTIVE BOX
DIABETES FOLLOW UP NOTE: Saw pt earlier today    Chief Complaint: Endocrine consult requested for management of DM    INTERVAL HX: pPt stable, reports eating well with BG levels mostly at goal for the past 24 hours while on present insulin doses. No hypoglycemia. Pt states she is feeling well and wants to go home but agreeable to going to subacute rehab if needed.  No complaints reported      Review of Systems:  General: As above  Cardiovascular: No chest pain, palpitations  Respiratory: No SOB, no cough  GI: No nausea, vomiting, abdominal pain  Endocrine: No S&Sx of hypoglycemia    Allergies    No Known Allergies    Intolerances      MEDICATIONS:  atorvastatin 40 milliGRAM(s) Oral at bedtime  insulin glargine Injectable (LANTUS) 14 Unit(s) SubCutaneous at bedtime  insulin lispro (ADMELOG) corrective regimen sliding scale   SubCutaneous three times a day before meals  insulin lispro (ADMELOG) corrective regimen sliding scale   SubCutaneous at bedtime  insulin lispro Injectable (ADMELOG) 8 Unit(s) SubCutaneous three times a day with meals        PHYSICAL EXAM:  VITALS: T(C): 36.9 (05-14-24 @ 16:00)  T(F): 98.4 (05-14-24 @ 16:00), Max: 98.9 (05-14-24 @ 12:00)  HR: 80 (05-14-24 @ 16:00) (77 - 89)  BP: 134/78 (05-14-24 @ 16:00) (125/75 - 167/81)  RR:  (18 - 18)  SpO2:  (99% - 100%)  Wt(kg): --  GENERAL: Female sitting in chair in NAD   Abdomen: Soft, nontender, non distended, central adiposity  Extremities: Warm, no edema in all 4 exts  NEURO: A&O X3    LABS:  POCT Blood Glucose.: 155 mg/dL (05-14-24 @ 16:16)  POCT Blood Glucose.: 137 mg/dL (05-14-24 @ 12:38)  POCT Blood Glucose.: 73 mg/dL (05-14-24 @ 11:53)  POCT Blood Glucose.: 127 mg/dL (05-14-24 @ 08:08)  POCT Blood Glucose.: 197 mg/dL (05-13-24 @ 21:48)  POCT Blood Glucose.: 89 mg/dL (05-13-24 @ 16:19)  POCT Blood Glucose.: 153 mg/dL (05-13-24 @ 11:24)  POCT Blood Glucose.: 71 mg/dL (05-13-24 @ 07:43)  POCT Blood Glucose.: 193 mg/dL (05-12-24 @ 21:24)  POCT Blood Glucose.: 266 mg/dL (05-12-24 @ 17:16)  POCT Blood Glucose.: 322 mg/dL (05-12-24 @ 11:30)  POCT Blood Glucose.: 120 mg/dL (05-12-24 @ 08:06)  POCT Blood Glucose.: 280 mg/dL (05-11-24 @ 21:27)                            9.4    5.26  )-----------( 512      ( 14 May 2024 12:57 )             29.9       05-13    142  |  107  |  14  ----------------------------<  63<L>  3.5   |  24  |  0.95    eGFR: 72    Ca    9.8      05-13    TPro  x   /  Alb  x   /  TBili  0.2  /  DBili  <0.1  /  AST  x   /  ALT  x   /  AlkPhos  x   05-13    Thyroid Function Tests:  05-04 @ 02:59 TSH 2.44 FreeT4 1.1 T3 60 Anti TPO -- Anti Thyroglobulin Ab -- TSI --      A1C with Estimated Average Glucose Result: >15.5 % (05-06-24 @ 18:28)  A1C with Estimated Average Glucose Result: >15.5 % (05-04-24 @ 02:59)      Estimated Average Glucose: >398 mg/dL (05-06-24 @ 18:28)  Estimated Average Glucose: >398 mg/dL (05-04-24 @ 02:59)        05-04 Chol 389<H> Direct LDL -- LDL calculated 274<H> HDL 73 Trig 203<H>

## 2024-05-14 NOTE — PROGRESS NOTE ADULT - PROBLEM SELECTOR PLAN 1
-Test BG ac and hs. Q6H while NPO  - Changed Lantus to 14 units QHS  - Change Admelog to 10-10-10 units with meals for now Hold if NPO   - Continue low Admelog correction scale ACTID and at HS  - Please teach patient, family and neighbor friend insulin pen use and glucose check   - Discharge planning:   Needs to c/w basal bolus insulin since pt was in DKA. Doses TBD  She will need initial supervision or assistance with insulin injections upon discharge for safety. Pt appears to be able to prepare and administer insulin via insulin pen but need to oractice more  Will need home care upon discharge due to need for insulin therapy but with impaired vision  Make sure pt has Rx for all DM supplies and insulin> send RX to [facility] for Novolog insulin pen. If not covered by insurance replace it with any other rapid acting analog.   -Prescribe a magnifier so pt can see numbers on insulin pens  Write rx for Prodigy voicemate glucose meter with strios and lancet> this meter is for the visually impaired  Can follow at [facility]. [address]. Phone . Email sent on 5/10   Needs Optho f/u as out pt -Test BG ac and hs. Q6H while NPO  - Changed Lantus to 14 units QHS  - Changed Admelog to 8 units with meals for now Hold if NPO   - Continue low Admelog correction scale ACTID and at HS  - Please teach patient, family and neighbor friend insulin pen use and glucose check   - Discharge planning:   Needs to c/w basal bolus insulin since pt was in DKA. Doses TBD  She will need initial supervision or assistance with insulin injections upon discharge for safety. Pt appears to be able to prepare and administer insulin via insulin pen but need to oractice more  Will need home care upon discharge due to need for insulin therapy but with impaired vision  Make sure pt has Rx for all DM supplies and insulin> send RX to [facility] for Novolog insulin pen. If not covered by insurance replace it with any other rapid acting analog.   -Prescribe a magnifier so pt can see numbers on insulin pens  Write rx for Prodigy voicemate glucose meter with strios and lancet> this meter is for the visually impaired  Can follow at [facility]. [address]. Phone . Email sent on 5/10   Needs Optho f/u as out pt

## 2024-05-14 NOTE — PROGRESS NOTE ADULT - ASSESSMENT
Exam as stated below:   CONSTITUTIONAL:   patient in intermittent distress when her pain occurs on her right side  SKIN: Warm dry.   EYES: No scleral icterus. Conjunctiva pink.  CARD: RRR. No murmurs.  RESP: Clear to ausculation b/l. No Crackles noted. No Wheezing noted.  ABD: Soft. Non-tender. Not distended.   pelvis:  patient at this time upon palpation right below her inguinal ligament on the right side with extreme tenderness to palpation.  DPs bilaterally 2+.  Patient with chronic stasis dermatitis bl   MSK: No pedal edema. No calf tenderness.  NEURO: UE/LE grossly intact. Motor UE/LE sensation grossly intact. CN II-XII grossly intact.   PSYCH: Cooperative, appropriate. 53F, no AC/AP, pmh diabetes hgBA1C 15 , p/w severe sudden-onset bifrontal HA,  with associated neck stiffness/blurry vision. Endorses blurry vision for over 1 year, HA has improved. Has also been having long-time peripheral neuropathy. Though diabetes was diagnosed > 10 years ago per endocrine note suspect overall poor follow up - patient endorses seeing PCP for the first time recently, saw optho a few months ago and says the pharmacy does not dispense her many insulin pens. Likely missing doses. Also endorses BG running 300-400 at home. Only currently on short acting insulin, no other home medications. Never seen an endocrinologist. 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 LP in ED w/ 59643 RBCs, and xanthochromic. Afebrile, WBC wnl. PLTs wnl, Coags wnl. Cerebral angio 5/4 which was negative for vascular malformations . Course was c/b DKA AG 20, bicarb 18 and UTI now s/p x3 days ceftriaxone . Prolonged hospital stay related to diabetic management & education    Plan    Neuro stable D/c nimodipine .   Vitals stable Monitor BP off Nimodipine   Type 2 DM-  Blood sugars 731-130s . On Lantus 14U . Decreased  premeal admelog to 8U tid.  Patient & family education on blood glucose monitoring & insulin administration via insulin pen . Pt w severe visual acuity deficit . Patient able to show back. Need reinforcements. Prodigy talk glucometer [facility]   DVT ppx  Possible d/c home w home care

## 2024-05-14 NOTE — PROGRESS NOTE ADULT - PROBLEM SELECTOR PLAN 5
on lovenox  Patient plans to make appointment with PCP after discharge   BM 5/10 per patient   LE duplex neg 5/4  TTE this admission with no acute abnormalities. Some anasarca from IVF for DKA.
A1c 15. with retinopathy, neuropathy. s/p insulin gtt for DKA   Despite DM being diagnosed over 10 years ago, it appears patient had been lost to f/u  Was on novolin outpatient   Endocrine following, recs appreciated. on lantus, premeal and ISS but with recurrent hypoglycemia  Insulin adjustment per endo  Outpatient f/u with optho for blurry vision - present for over 1 year and has been told this is d/t DM  Outpatient f/u with podiatry.
on lovenox  Patient knows to make appointment with PCP within 3 days of discharge   LE duplex neg 5/4

## 2024-05-14 NOTE — PROGRESS NOTE ADULT - PROBLEM SELECTOR PLAN 3
< from: CT Abdomen and Pelvis No Cont (05.13.24 @ 22:26) >    Suggestion of pancreatic head fullness with mild adjacent fat stranding.   Differential possibilities include acute pancreatitis, cannot exclude   underlying pancreatic lesion by noncontrast assessment. Recommend lipase   correlation. Advise follow-up CT abdomen pancreas protocol with   intravenous contrast.    < end of copied text >    pt is asymptomatic but will need CT c pancreas protocol with IV contrast within 2 weeks of discharge- d/w pt. < from: CT Abdomen and Pelvis No Cont (05.13.24 @ 22:26) >    Suggestion of pancreatic head fullness with mild adjacent fat stranding.   Differential possibilities include acute pancreatitis, cannot exclude   underlying pancreatic lesion by noncontrast assessment. Recommend lipase   correlation. Advise follow-up CT abdomen pancreas protocol with   intravenous contrast.    < end of copied text >    pt is asymptomatic but will need CT c pancreas protocol with IV contrast within 2 weeks of discharge- d/w pt. lipase can be checked at rehab as pt is asymptomatic

## 2024-05-14 NOTE — PROGRESS NOTE ADULT - NSPROGADDITIONALINFOA_GEN_ALL_CORE
-Plan discussed with pt/team.  Contact info: [phone] (24/7). pager [phone]  Amion on [place]  Teams on M-T-W-F. Unavailable Thu/Weekends/Holidays  Provided face to face education which was more than 50% of encounter that also included assessing pt/labs/meds and discussing plan of care with primary team.  Adjusting insulin  Discharge plan  Follow up care

## 2024-05-14 NOTE — PROGRESS NOTE ADULT - SUBJECTIVE AND OBJECTIVE BOX
Saurabh Ricketts   contact via pager or TEAMS        CC: Patient is a 53y old  Female who presents with a chief complaint of severe headache, SAH (13 May 2024 18:38)      SUBJECTIVE / OVERNIGHT EVENTS:    MEDICATIONS  (STANDING):  atorvastatin 40 milliGRAM(s) Oral at bedtime  bisacodyl 5 milliGRAM(s) Oral at bedtime  enoxaparin Injectable 40 milliGRAM(s) SubCutaneous <User Schedule>  insulin glargine Injectable (LANTUS) 16 Unit(s) SubCutaneous at bedtime  insulin lispro (ADMELOG) corrective regimen sliding scale   SubCutaneous three times a day before meals  insulin lispro (ADMELOG) corrective regimen sliding scale   SubCutaneous at bedtime  insulin lispro Injectable (ADMELOG) 12 Unit(s) SubCutaneous before breakfast  insulin lispro Injectable (ADMELOG) 10 Unit(s) SubCutaneous before dinner  insulin lispro Injectable (ADMELOG) 10 Unit(s) SubCutaneous before lunch  niMODipine 60 milliGRAM(s) Oral every 4 hours  pantoprazole    Tablet 40 milliGRAM(s) Oral daily  polyethylene glycol 3350 17 Gram(s) Oral every 12 hours  senna 2 Tablet(s) Oral at bedtime  silver sulfADIAZINE 1% Cream 1 Application(s) Topical two times a day    MEDICATIONS  (PRN):  acetaminophen     Tablet .. 650 milliGRAM(s) Oral every 6 hours PRN Temp greater or equal to 38.5C (101.3F), Mild Pain (1 - 3)  ondansetron Injectable 4 milliGRAM(s) IV Push every 8 hours PRN Nausea and/or Vomiting      Vital Signs Last 24 Hrs  T(C): 36.9 (14 May 2024 05:34), Max: 38.3 (13 May 2024 17:04)  T(F): 98.5 (14 May 2024 05:34), Max: 100.9 (13 May 2024 17:04)  HR: 77 (14 May 2024 05:34) (77 - 86)  BP: 125/75 (14 May 2024 05:34) (125/71 - 167/81)  BP(mean): --  RR: 18 (14 May 2024 05:34) (18 - 18)  SpO2: 100% (14 May 2024 05:34) (99% - 100%)  CAPILLARY BLOOD GLUCOSE      POCT Blood Glucose.: 127 mg/dL (14 May 2024 08:08)  POCT Blood Glucose.: 197 mg/dL (13 May 2024 21:48)  POCT Blood Glucose.: 89 mg/dL (13 May 2024 16:19)  POCT Blood Glucose.: 153 mg/dL (13 May 2024 11:24)    I&O's Summary    13 May 2024 07:01  -  14 May 2024 07:00  --------------------------------------------------------  IN: 680 mL / OUT: 0 mL / NET: 680 mL      tele:    PHYSICAL EXAM:    GENERAL: NAD   HEENT: EOMI, PERRL  PULM: Clear to auscultation bilaterally  CV: Regular rate and rhythm; nl S1, S2; No murmurs, rubs, or gallops  ABDOMEN: Soft, Nontender, Nondistended; Bowel sounds present  EXTREMITIES/MSK:  No edema, calf tenderness   PSYCH: AAOx3  NEUROLOGY: non-focal          LABS:                        8.9    4.94  )-----------( 438      ( 13 May 2024 07:14 )             28.0     05-13    142  |  107  |  14  ----------------------------<  63<L>  3.5   |  24  |  0.95    Ca    9.8      13 May 2024 07:14    TPro  x   /  Alb  x   /  TBili  0.2  /  DBili  <0.1  /  AST  x   /  ALT  x   /  AlkPhos  x   05-13          Urinalysis Basic - ( 13 May 2024 07:14 )    Color: x / Appearance: x / SG: x / pH: x  Gluc: 63 mg/dL / Ketone: x  / Bili: x / Urobili: x   Blood: x / Protein: x / Nitrite: x   Leuk Esterase: x / RBC: x / WBC x   Sq Epi: x / Non Sq Epi: x / Bacteria: x          RADIOLOGY & ADDITIONAL TESTS:    Imaging Personally Reviewed:    Consultant(s) Notes Reviewed:      Care Discussed with Consultants/Other Providers:   Saurabh Ricketts   contact via pager or TEAMS        CC: Patient is a 53y old  Female who presents with a chief complaint of severe headache, SAH (13 May 2024 18:38)      SUBJECTIVE / OVERNIGHT EVENTS: recorder fever yest but pt denied symptoms. staff/neurosurg discussing if it was an error. pt denies any urinary sx, cough, cp/sob, n/v/d/abd pain. no HA. no skin changes    MEDICATIONS  (STANDING):  atorvastatin 40 milliGRAM(s) Oral at bedtime  bisacodyl 5 milliGRAM(s) Oral at bedtime  enoxaparin Injectable 40 milliGRAM(s) SubCutaneous <User Schedule>  insulin glargine Injectable (LANTUS) 16 Unit(s) SubCutaneous at bedtime  insulin lispro (ADMELOG) corrective regimen sliding scale   SubCutaneous three times a day before meals  insulin lispro (ADMELOG) corrective regimen sliding scale   SubCutaneous at bedtime  insulin lispro Injectable (ADMELOG) 12 Unit(s) SubCutaneous before breakfast  insulin lispro Injectable (ADMELOG) 10 Unit(s) SubCutaneous before dinner  insulin lispro Injectable (ADMELOG) 10 Unit(s) SubCutaneous before lunch  niMODipine 60 milliGRAM(s) Oral every 4 hours  pantoprazole    Tablet 40 milliGRAM(s) Oral daily  polyethylene glycol 3350 17 Gram(s) Oral every 12 hours  senna 2 Tablet(s) Oral at bedtime  silver sulfADIAZINE 1% Cream 1 Application(s) Topical two times a day    MEDICATIONS  (PRN):  acetaminophen     Tablet .. 650 milliGRAM(s) Oral every 6 hours PRN Temp greater or equal to 38.5C (101.3F), Mild Pain (1 - 3)  ondansetron Injectable 4 milliGRAM(s) IV Push every 8 hours PRN Nausea and/or Vomiting      Vital Signs Last 24 Hrs  T(C): 36.9 (14 May 2024 05:34), Max: 38.3 (13 May 2024 17:04)  T(F): 98.5 (14 May 2024 05:34), Max: 100.9 (13 May 2024 17:04)  HR: 77 (14 May 2024 05:34) (77 - 86)  BP: 125/75 (14 May 2024 05:34) (125/71 - 167/81)  BP(mean): --  RR: 18 (14 May 2024 05:34) (18 - 18)  SpO2: 100% (14 May 2024 05:34) (99% - 100%)  CAPILLARY BLOOD GLUCOSE      POCT Blood Glucose.: 127 mg/dL (14 May 2024 08:08)  POCT Blood Glucose.: 197 mg/dL (13 May 2024 21:48)  POCT Blood Glucose.: 89 mg/dL (13 May 2024 16:19)  POCT Blood Glucose.: 153 mg/dL (13 May 2024 11:24)    I&O's Summary    13 May 2024 07:01  -  14 May 2024 07:00  --------------------------------------------------------  IN: 680 mL / OUT: 0 mL / NET: 680 mL          PHYSICAL EXAM:    GENERAL: NAD   HEENT: EOMI, PERRL  PULM: Clear to auscultation bilaterally  CV: Regular rate and rhythm; nl S1, S2; No murmurs, rubs, or gallops  ABDOMEN: Soft, Nontender, Nondistended; Bowel sounds present  EXTREMITIES/MSK:  No edema, calf tenderness   PSYCH: AAOx3  NEUROLOGY: non-focal  SKIN: no erythema/induration          LABS:                        8.9    4.94  )-----------( 438      ( 13 May 2024 07:14 )             28.0     05-13    142  |  107  |  14  ----------------------------<  63<L>  3.5   |  24  |  0.95    Ca    9.8      13 May 2024 07:14    TPro  x   /  Alb  x   /  TBili  0.2  /  DBili  <0.1  /  AST  x   /  ALT  x   /  AlkPhos  x   05-13          Urinalysis Basic - ( 13 May 2024 07:14 )    Color: x / Appearance: x / SG: x / pH: x  Gluc: 63 mg/dL / Ketone: x  / Bili: x / Urobili: x   Blood: x / Protein: x / Nitrite: x   Leuk Esterase: x / RBC: x / WBC x   Sq Epi: x / Non Sq Epi: x / Bacteria: x          RADIOLOGY & ADDITIONAL TESTS:    Imaging Personally Reviewed:    Consultant(s) Notes Reviewed:      Care Discussed with Consultants/Other Providers: neurosurg

## 2024-05-14 NOTE — PROGRESS NOTE ADULT - ASSESSMENT
53F w/h/o uncontrolled T2DM (A1C >15%) on basal/bolus insulin therapy with poor adherence. DM c/b CAD/MI/CVA. Also HTN/HLD. Here with severe sudden-onset bifrontal HA with associated neck stiffness/blurry vision >  admitted with SAH/DKA (AG 20, bicarb 18, BHB 7.3)/UTI. Endocrinology was consulted for management of diabetes mellitus. Tolerating POs with on/off hypo/hyperglycemia due to variable and unpredictable PO intake. BG in last 24 hours improved with the exception of BG 73 before lunch today. FBG improved but still coming down a lot from bedtime value. Will preventively decrease basal and breakfast time insulins since BG are coming down every day around that time.   Noted antibodies for RIYA are negative. Pt with T2DM and insulin deficiency 2/2 long standing uncontrolled DM    Spoke to pt about the following:  -Diet: Including carb basics, portion control and consistent PO intake  -Patient has poor vision. Pt reports her vision is getting worse so  was setting up Basaglar insulin pen but pt was nt taking it consistently. Pt agreeable to learn how to use insulin pens by counting clicks and  can set up pen for basal insulin every day.     Reviewed with pt how to use an insulin pen if having vision impairment. Pt was able to return demonstration x2 correctly and states she feels she can do insulin at home by herself. Will need a glass magnifier to see numbers on insulin pen but pt was able to set up correct insulin doses by listening to dial clicks.  Spoke to team and pt's RN to continue practice use of insulin pen under RN supervision. Spent >35 minutes providing direct education with pt         53F w/h/o uncontrolled T2DM (A1C >15%) on basal/bolus insulin therapy with poor adherence. DM c/b CAD/MI/CVA. Also HTN/HLD. Here with severe sudden-onset bifrontal HA with associated neck stiffness/blurry vision >  admitted with SAH/DKA (AG 20, bicarb 18, BHB 7.3)/UTI. Endocrinology was consulted for management of diabetes mellitus. Tolerating POs with on/off hypo/hyperglycemia due to variable and unpredictable PO intake. BG in last 24 hours improved with the exception of BG 73 before lunch today. FBG improved but still coming down a lot from bedtime value. Will preventively decrease basal and meal time insulins. BG goal 100 to 180s  Noted antibodies for RIYA are negative. Pt with T2DM and insulin deficiency 2/2 long standing uncontrolled DM    Spoke to pt about the following:  -Diet: Including carb basics, portion control and consistent PO intake  -Patient has poor vision. Pt reports her vision is getting worse so  was setting up Basaglar insulin pen but pt was nt taking it consistently. Pt agreeable to learn how to use insulin pens by counting clicks and  can set up pen for basal insulin every day.     Reviewed with pt how to use an insulin pen if having vision impairment. Pt was able to return demonstration x2 correctly and states she feels she can do insulin at home by herself. Will need a glass magnifier to see numbers on insulin pen but pt was able to set up correct insulin doses by listening to dial clicks.  Spoke to team and pt's RN to continue practice use of insulin pen under RN supervision. Spent >35 minutes providing direct education with pt

## 2024-05-14 NOTE — PROGRESS NOTE ADULT - PROBLEM SELECTOR PLAN 7
on lovenox  Patient knows to make appointment with PCP within 3 days of discharge   LE duplex neg 5/4

## 2024-05-14 NOTE — PROGRESS NOTE ADULT - PROBLEM SELECTOR PLAN 4
Baseline appears to be 11-12  Stable between 8-9, likely dilutional component from IVF but c/t monitor  Outpatient f/u with PCP.
Baseline appears to be 11-12  Black stools PTA. GI consult. check Iron studies.  Outpatient f/u with PCP.
Management per neurosurgery   Nimodipine per neurosurg. may need HTN med once nimodipine removed. can use Amlodipine until she follows up with PMD at which point she may need to be switched to ACE-I or ARB  BP stable.

## 2024-05-14 NOTE — PROGRESS NOTE ADULT - TIME BILLING
Medically complex 2/2 perimesencaphalic SAH, hyperglycemia, low risk for vasospasm
chart review, d/w pt, exam, d/w neurosurg, note
subarachnoid heme
subarachnoid hemorrhage
Medically complex 2/2 angio negative SAH, UTI  Not critically ill  I have edited the above note to reflect my own examination, assessment, and plan for Ms. Wolff
chart review, d/w pt, exam, d/w neurosurg, note

## 2024-05-15 LAB
GLUCOSE BLDC GLUCOMTR-MCNC: 110 MG/DL — HIGH (ref 70–99)
GLUCOSE BLDC GLUCOMTR-MCNC: 172 MG/DL — HIGH (ref 70–99)
GLUCOSE BLDC GLUCOMTR-MCNC: 178 MG/DL — HIGH (ref 70–99)
GLUCOSE BLDC GLUCOMTR-MCNC: 93 MG/DL — SIGNIFICANT CHANGE UP (ref 70–99)

## 2024-05-15 PROCEDURE — 99232 SBSQ HOSP IP/OBS MODERATE 35: CPT

## 2024-05-15 RX ADMIN — AMLODIPINE BESYLATE 2.5 MILLIGRAM(S): 2.5 TABLET ORAL at 21:12

## 2024-05-15 RX ADMIN — Medication 1: at 16:38

## 2024-05-15 RX ADMIN — Medication 8 UNIT(S): at 08:38

## 2024-05-15 RX ADMIN — Medication 3 MILLIGRAM(S): at 21:11

## 2024-05-15 RX ADMIN — ENOXAPARIN SODIUM 40 MILLIGRAM(S): 100 INJECTION SUBCUTANEOUS at 17:00

## 2024-05-15 RX ADMIN — POLYETHYLENE GLYCOL 3350 17 GRAM(S): 17 POWDER, FOR SOLUTION ORAL at 05:10

## 2024-05-15 RX ADMIN — Medication 1 APPLICATION(S): at 17:04

## 2024-05-15 RX ADMIN — Medication 1 APPLICATION(S): at 05:10

## 2024-05-15 RX ADMIN — Medication 1: at 08:37

## 2024-05-15 RX ADMIN — PANTOPRAZOLE SODIUM 40 MILLIGRAM(S): 20 TABLET, DELAYED RELEASE ORAL at 11:48

## 2024-05-15 RX ADMIN — ATORVASTATIN CALCIUM 40 MILLIGRAM(S): 80 TABLET, FILM COATED ORAL at 21:11

## 2024-05-15 RX ADMIN — INSULIN GLARGINE 14 UNIT(S): 100 INJECTION, SOLUTION SUBCUTANEOUS at 21:12

## 2024-05-15 RX ADMIN — Medication 8 UNIT(S): at 17:03

## 2024-05-15 RX ADMIN — Medication 8 UNIT(S): at 11:48

## 2024-05-15 NOTE — PROGRESS NOTE ADULT - ASSESSMENT
53F, no AC/AP, pmh diabetes hgBA1C 15 , p/w severe sudden-onset bifrontal HA,  with associated neck stiffness/blurry vision. Endorses blurry vision for over 1 year, HA has improved. Has also been having long-time peripheral neuropathy. Though diabetes was diagnosed > 10 years ago per endocrine note suspect overall poor follow up - patient endorses seeing PCP for the first time recently, saw optho a few months ago and says the pharmacy does not dispense her many insulin pens. Likely missing doses. Also endorses BG running 300-400 at home. Only currently on short acting insulin, no other home medications. Never seen an endocrinologist. 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 LP in ED w/ 87578 RBCs, and xanthochromic. Afebrile, WBC wnl. PLTs wnl, Coags wnl. Cerebral angio 5/4 which was negative for vascular malformations . Course was c/b DKA AG 20, bicarb 18 and UTI now s/p x3 days ceftriaxone . Prolonged hospital stay related to diabetic management & education    Plan    Neuro stable. off Nimodipine   Vitals stable. Low dose amlodipine 2.5mg QD started last night   Type 2 DM-  Blood sugars 110-170 . On Lantus 14U . premeal admelog 8U tid. Continued Patient  education on blood glucose monitoring & insulin administration via insulin pen . Pt w severe visual acuity deficit . Patient able to show back. Need reinforcements.   DVT ppx  Possible d/c home w home care vs [facility] (auth pending) .  D/w   [place] [phone] . Can only come in 5/17/24. will bring magnifying glass & order Prodigy talk glucometer  online ( Not covered)

## 2024-05-15 NOTE — PROGRESS NOTE ADULT - ASSESSMENT
53F w/h/o uncontrolled T2DM (A1C >15%) on basal/bolus insulin therapy with poor adherence. DM c/b CAD/MI/CVA. Also HTN/HLD. Here with severe sudden-onset bifrontal HA with associated neck stiffness/blurry vision >  admitted with SAH/DKA (AG 20, bicarb 18, BHB 7.3)/UTI. Endocrinology was consulted for management of diabetes mellitus. Tolerating POs with on/off hypo/hyperglycemia due to variable and unpredictable PO intake. BG in last 24 hours at goal without any hypoglycemia. Will continue with present insulin regimen to keep BG goal 100 to 180s.     Noted antibodies for RIYA are negative. Pt with T2DM and insulin deficiency 2/2 long standing uncontrolled DM and low c-peptide placing pt at risk for DKA    Spoke to pt about the following:  -Diet: Including carb basics, portion control and consistent PO intake  -Patient has poor vision. Pt reports her vision is getting worse so  was setting up Basaglar insulin pen but pt was nt taking it consistently. Pt agreeable to learn how to use insulin pens by counting clicks and  can set up pen for basal insulin every day.     Pt physicall able to use insulin pen as instructed yesterday but has trouble recalling steps on its use today. Needs verbal cueing. Spoke to team, , RN that pt needs to be able to recall and demonstrate use of insulin pen without assistance in order to safely discharge pt home. Staff will continue to reinforce education and allow pt to do own finger sticks and insulin administration under RN supervision.

## 2024-05-15 NOTE — PROGRESS NOTE ADULT - SUBJECTIVE AND OBJECTIVE BOX
SUBJECTIVE:   Doing well. Ambulating wo assist   OVERNIGHT EVENTS: none    Vital Signs Last 24 Hrs  T(C): 36.4 (15 May 2024 08:00), Max: 36.9 (14 May 2024 16:00)  T(F): 97.6 (15 May 2024 08:00), Max: 98.4 (14 May 2024 16:00)  HR: 76 (15 May 2024 08:00) (72 - 82)  BP: 125/73 (15 May 2024 08:00) (125/73 - 181/97)  BP(mean): --  RR: 18 (15 May 2024 08:00) (18 - 18)  SpO2: 100% (15 May 2024 08:00) (99% - 100%)    Parameters below as of 15 May 2024 08:00  Patient On (Oxygen Delivery Method): room air        PHYSICAL EXAM:    Neurological: Awake alert Ox3,Speech clear  Following Commands, Moving all Extremities 5/5 No drift. BERNARD EOMI      Pulmonary: Clear to Auscultation,     Cardiovascular: S1, S2, Regular rate and rhythm     Gastrointestinal: Soft, Non-tender, Non-distended     LABS:                        9.4    5.26  )-----------( 512      ( 14 May 2024 12:57 )             29.9            IMAGING:         MEDICATIONS:    acetaminophen     Tablet .. 650 milliGRAM(s) Oral every 6 hours PRN Temp greater or equal to 38.5C (101.3F), Mild Pain (1 - 3)  melatonin 3 milliGRAM(s) Oral at bedtime  amLODIPine   Tablet 2.5 milliGRAM(s) Oral at bedtime  bisacodyl 5 milliGRAM(s) Oral at bedtime  pantoprazole    Tablet 40 milliGRAM(s) Oral daily  polyethylene glycol 3350 17 Gram(s) Oral every 12 hours  senna 2 Tablet(s) Oral at bedtime  atorvastatin 40 milliGRAM(s) Oral at bedtime  enoxaparin Injectable 40 milliGRAM(s) SubCutaneous <User Schedule>  insulin glargine Injectable (LANTUS) 14 Unit(s) SubCutaneous at bedtime  insulin lispro (ADMELOG) corrective regimen sliding scale   SubCutaneous three times a day before meals  insulin lispro (ADMELOG) corrective regimen sliding scale   SubCutaneous at bedtime  insulin lispro Injectable (ADMELOG) 8 Unit(s) SubCutaneous three times a day with meals  silver sulfADIAZINE 1% Cream 1 Application(s) Topical two times a day      DIET:

## 2024-05-15 NOTE — PROGRESS NOTE ADULT - PROBLEM SELECTOR PLAN 1
-Test BG ac and hs. Q6H while NPO  - C/w Lantus  14 units QHS  - C/w Admelog to 8 units with meals for now Hold if NPO   - Continue low Admelog correction scale ACTID and at HS  - Please teach and allow pt/  to do  finger sticks and insulin injections under RN supervision  Please reinforce use of insulin pen as instructed yesterday  Please document teach back   - Discharge planning:   Needs to c/w basal bolus insulin since pt was in DKA at admission and has low c peptide. Doses TBD  She will need initial supervision or assistance with insulin injections upon discharge for safety. Pt appears to be able to prepare and administer insulin via insulin pen but need to practice more  Will need home care upon discharge due to need for insulin therapy with impaired vision  Make sure pt has Rx for all DM supplies and insulin> [facility] for Novolog insulin pen. If not covered by insurance replace it with any other rapid acting analog.   -Prescribe a magnifier so pt can see numbers on insulin pens  Write rx for Prodigy voicemate glucose meter with strips and lancets> this meter is for the visually impaired  Pt can be discharged safely once she is able to demonstrate use of insulin pen and voice mate glucose meter iwthout cueing and  is able to demonstrate the same to assist pt as needed.   Can follow at [facility] practice. [address]. Phone . Email sent on 5/10   Needs Optho f/u as out pt -Test BG ac and hs. Q6H while NPO  - C/w Lantus  14 units QHS  - C/w Admelog to 8 units with meals for now Hold if NPO   - Continue low Admelog correction scale ACTID and at HS  - Please teach and allow pt/  to do  finger sticks and insulin injections under RN supervision  Please reinforce use of insulin pen as instructed yesterday  Please document teach back   -Will have CDE see pt tomorrow and check teach back as well as reinforce education as needed.   - Discharge planning:   Needs to c/w basal bolus insulin since pt was in DKA at admission and has low c peptide. Doses TBD  She will need initial supervision or assistance with insulin injections upon discharge for safety. Pt appears to be able to prepare and administer insulin via insulin pen but need to practice more  Will need home care upon discharge due to need for insulin therapy with impaired vision  Make sure pt has Rx for all DM supplies and insulin> [facility] RX to [facility] for Novolog insulin pen. If not covered by insurance replace it with any other rapid acting analog.   -Prescribe a magnifier so pt can see numbers on insulin pens  Write rx for Prodigy voicemate glucose meter with strips and lancets> this meter is for the visually impaired  Pt can be discharged safely once she is able to demonstrate use of insulin pen and voice mate glucose meter iwthout cueing and  is able to demonstrate the same to assist pt as needed.   Can follow at [facility] practice. [address]. Phone . Email sent on 5/10   Needs Optho f/u as out pt

## 2024-05-15 NOTE — PROGRESS NOTE ADULT - SUBJECTIVE AND OBJECTIVE BOX
DIABETES FOLLOW UP NOTE: Saw pt earlier today    Chief Complaint: Endocrine consult requested for management of DM    INTERVAL HX: Pt stable, reports eating well with BG levels mostly at goal for the past 24 hours while on present insulin doses. No hypoglycemia. Pt states she is feeling well and wants to go home but agreeable to going to subacute rehab if needed.  No complaints reported but pt forgot how to do insulin pen as instructed yesterday, Ot needs verbal instructions to be able to do it.          Review of Systems:  General: As above  Cardiovascular: No chest pain, palpitations  Respiratory: No SOB, no cough  GI: No nausea, vomiting, abdominal pain  Endocrine: No polyuria, polydipsia or S&Sx of hypoglycemia    Allergies    No Known Allergies    Intolerances      MEDICATIONS:  atorvastatin 40 milliGRAM(s) Oral at bedtime  insulin glargine Injectable (LANTUS) 14 Unit(s) SubCutaneous at bedtime  insulin lispro (ADMELOG) corrective regimen sliding scale   SubCutaneous three times a day before meals  insulin lispro (ADMELOG) corrective regimen sliding scale   SubCutaneous at bedtime  insulin lispro Injectable (ADMELOG) 8 Unit(s) SubCutaneous three times a day with meals      PHYSICAL EXAM:  VITALS: T(C): 36.8 (05-15-24 @ 16:38)  T(F): 98.2 (05-15-24 @ 16:38), Max: 98.2 (05-14-24 @ 20:40)  HR: 80 (05-15-24 @ 16:38) (72 - 82)  BP: 136/82 (05-15-24 @ 16:38) (125/73 - 181/97)  RR:  (18 - 18)  SpO2:  (99% - 100%)  Wt(kg): --  GENERAL: Female sitting in chair in NAD   Abdomen: Soft, nontender, non distended, central adiposity  Extremities: Warm, no edema in all 4 exts. 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   NEURO: A&O X3    LABS:  POCT Blood Glucose.: 178 mg/dL (05-15-24 @ 16:18)  POCT Blood Glucose.: 110 mg/dL (05-15-24 @ 11:34)  POCT Blood Glucose.: 172 mg/dL (05-15-24 @ 08:03)  POCT Blood Glucose.: 149 mg/dL (05-14-24 @ 21:30)  POCT Blood Glucose.: 155 mg/dL (05-14-24 @ 16:16)  POCT Blood Glucose.: 137 mg/dL (05-14-24 @ 12:38)  POCT Blood Glucose.: 73 mg/dL (05-14-24 @ 11:53)  POCT Blood Glucose.: 127 mg/dL (05-14-24 @ 08:08)  POCT Blood Glucose.: 197 mg/dL (05-13-24 @ 21:48)  POCT Blood Glucose.: 89 mg/dL (05-13-24 @ 16:19)  POCT Blood Glucose.: 153 mg/dL (05-13-24 @ 11:24)  POCT Blood Glucose.: 71 mg/dL (05-13-24 @ 07:43)  POCT Blood Glucose.: 193 mg/dL (05-12-24 @ 21:24)                            9.4    5.26  )-----------( 512      ( 14 May 2024 12:57 )             29.9       05-13    142  |  107  |  14  ----------------------------<  63<L>  3.5   |  24  |  0.95    eGFR: 72    Ca    9.8      05-13    TPro  x   /  Alb  x   /  TBili  0.2  /  DBili  <0.1  /  AST  x   /  ALT  x   /  AlkPhos  x   05-13    Thyroid Function Tests:  05-04 @ 02:59 TSH 2.44 FreeT4 1.1 T3 60 Anti TPO -- Anti Thyroglobulin Ab -- TSI --      A1C with Estimated Average Glucose Result: >15.5 % (05-06-24 @ 18:28)  A1C with Estimated Average Glucose Result: >15.5 % (05-04-24 @ 02:59)      Estimated Average Glucose: >398 mg/dL (05-06-24 @ 18:28)  Estimated Average Glucose: >398 mg/dL (05-04-24 @ 02:59)    C-Peptide, Serum: 0.2 ng/mL (05-11-24 @ 06:52)      05-04 Chol 389<H> Direct LDL -- LDL calculated 274<H> HDL 73 Trig 203<H>

## 2024-05-15 NOTE — PROGRESS NOTE ADULT - NSPROGADDITIONALINFOA_GEN_ALL_CORE
-Plan discussed with pt/team.  Contact info: [phone] (24/7). pager [phone]  Amion on [employer]  Teams on M-T-W-F. Unavailable Thu/Weekends/Holidays  Provided face to face education which was more than 50% of encounter that also included assessing pt/labs/meds and discussing plan of care with primary team.  Adjusting insulin  Discharge plan  Follow up care

## 2024-05-16 LAB
GLUCOSE BLDC GLUCOMTR-MCNC: 158 MG/DL — HIGH (ref 70–99)
GLUCOSE BLDC GLUCOMTR-MCNC: 180 MG/DL — HIGH (ref 70–99)
GLUCOSE BLDC GLUCOMTR-MCNC: 211 MG/DL — HIGH (ref 70–99)
GLUCOSE BLDC GLUCOMTR-MCNC: 77 MG/DL — SIGNIFICANT CHANGE UP (ref 70–99)

## 2024-05-16 PROCEDURE — 99232 SBSQ HOSP IP/OBS MODERATE 35: CPT

## 2024-05-16 RX ORDER — INSULIN GLARGINE 100 [IU]/ML
12 INJECTION, SOLUTION SUBCUTANEOUS AT BEDTIME
Refills: 0 | Status: DISCONTINUED | OUTPATIENT
Start: 2024-05-16 | End: 2024-05-17

## 2024-05-16 RX ORDER — INSULIN LISPRO 100/ML
6 VIAL (ML) SUBCUTANEOUS
Refills: 0 | Status: DISCONTINUED | OUTPATIENT
Start: 2024-05-16 | End: 2024-05-17

## 2024-05-16 RX ADMIN — AMLODIPINE BESYLATE 2.5 MILLIGRAM(S): 2.5 TABLET ORAL at 22:29

## 2024-05-16 RX ADMIN — Medication 2: at 17:03

## 2024-05-16 RX ADMIN — Medication 8 UNIT(S): at 11:46

## 2024-05-16 RX ADMIN — ATORVASTATIN CALCIUM 40 MILLIGRAM(S): 80 TABLET, FILM COATED ORAL at 22:29

## 2024-05-16 RX ADMIN — Medication 6 UNIT(S): at 17:04

## 2024-05-16 RX ADMIN — Medication 1: at 11:43

## 2024-05-16 RX ADMIN — Medication 1 APPLICATION(S): at 17:03

## 2024-05-16 RX ADMIN — INSULIN GLARGINE 12 UNIT(S): 100 INJECTION, SOLUTION SUBCUTANEOUS at 22:30

## 2024-05-16 RX ADMIN — SENNA PLUS 2 TABLET(S): 8.6 TABLET ORAL at 22:30

## 2024-05-16 RX ADMIN — ENOXAPARIN SODIUM 40 MILLIGRAM(S): 100 INJECTION SUBCUTANEOUS at 17:04

## 2024-05-16 RX ADMIN — PANTOPRAZOLE SODIUM 40 MILLIGRAM(S): 20 TABLET, DELAYED RELEASE ORAL at 11:35

## 2024-05-16 RX ADMIN — Medication 1 APPLICATION(S): at 05:38

## 2024-05-16 RX ADMIN — Medication 3 MILLIGRAM(S): at 22:30

## 2024-05-16 NOTE — PROGRESS NOTE ADULT - PROBLEM SELECTOR PLAN 1
-Test BG ac and hs. Q6H while NPO  - adjust Lantus to 12 units QHS, FBG 77 want to prevent possible hypoglycemia   - adjust Admelog to 6 units with meals hold if NPO or not eating  - Continue low Admelog correction scale ACTID and at HS  - Please teach and allow pt/  to do  finger sticks and insulin injections under RN supervision  Please reinforce use of insulin pen as instructed yesterday  Please document teach back     - Discharge planning:   Patient has been taught self administration of insulin pen. She is able to manage self administration without supervision or verbal cues. She was provided with a magnifier and uses counting clicks to ensure correct dosing.   Needs to c/w basal bolus insulin since pt was in DKA at admission and has low c peptide.     Basal insulin Lantus or equivalent 12units QHS.  Lispro insulin Admelog or equivalent 6units with each meal, hold if not eating.  Doses may need to be adjusted outpatient, patient to call MD if BG <70mg/dL or >250mg/dL consistently.  Make sure pt has Rx for all DM supplies and insulin> send RX to [facility] for Novolog insulin pen. If not covered by insurance replace it with any other rapid acting analog.   Write rx for Prodigy voicemate glucose meter with strips and lancets> for the visually impaired.    Can follow at [facility]. [address]. Phone . Email sent on 5/10.  Needs Optho and Podiatry f/u as out patient -Test BG ac and hs. Q6H while NPO  - adjust Lantus to 12 units QHS, FBG 77 want to prevent possible hypoglycemia   - adjust Admelog to 6 units with meals hold if NPO or not eating  - Continue low Admelog correction scale ACTID and at HS  - Please teach and allow pt/  to do  finger sticks and insulin injections under RN supervision  Please reinforce use of insulin pen as instructed yesterday  Please document teach back     - Discharge planning:   Patient has been taught self administration of insulin pen. She is able to manage self administration without supervision or verbal cues. She will use magnifier and count clicks to ensure correct dosing due to impaired vision.   Needs to c/w basal bolus insulin since pt was in DKA at admission and has low c peptide.     Basal insulin Lantus or equivalent 12units QHS.  Lispro insulin Admelog or equivalent 6units with each meal, hold if not eating.  Doses may need to be adjusted outpatient, patient to call MD if BG <70mg/dL or >250mg/dL consistently.  Make sure pt has Rx for all DM supplies and insulin> send RX to [facility] for Novolog insulin pen. If not covered by insurance replace it with any other rapid acting analog.   Write rx for Prodigy voicemate glucose meter with strips and lancets> for the visually impaired.    Can follow at [facility], [place], operated by [facility]. [address]. Phone . Email sent on 5/10.  Needs Optho and Podiatry f/u as out patient

## 2024-05-16 NOTE — PROGRESS NOTE ADULT - ASSESSMENT
53F, no AC/AP, pmh diabetes, p/w severe sudden-onset bifrontal HA this AM with associated neck stiffness/blurry vision. CTH/CTA read as negative by radiologist but appears to have R perimes SAH (HH1, MF1). LP in ED w/ 45473 RBCs, and xanthochromic. Afebrile, WBC wnl. PLTs wnl, Coags wnl  s/p cerebral angiogram 5/4, negative for aneurysm     NEURO:   Neuro checks q4h   TCD's within normal limits, continue nimodipine   cerebral angiogram negative for aneurysm 5/4   pain control w/ tylenol prn   PT/OT rec outpatient     PULM: on room air, sat >92%   Incentive spirometer at bedside, mobilize as tolerated    CV: -160 mmHg, within parameter   statin     RENAL: IVL, voiding   replete electrolytes prn. hypokalemia 3.5, given 40 mEq 40 x2     GI: CCD   continue bowel regimen w/ miralax and senna. LBM 5/11   GI consult for "black" stools prior to hospital admission - No GI intervention, will need outpatient f/u for EGD/Colonoscopy     ENDO:  Goal euglycemia (-180)  A1C 15%, T2DM. presented in DKA now resolved gap closed. Endocrine following recs Lantus 16U and premeal adjusted admelog. pending discharge recs     HEME/ONC: h/h downtrending, however hemodynamically stable. iron studies pending   VTE prophylaxis: [x] SCDs [x] chemoprophylaxis SQL, LED 5/4 negative for DVT     ID: afebrile     Possible d/c home w home care vs [facility] (auth pending) . Pt w severe visual acuity deficit . She has been getting diabetic teaching. Patient able to show back. Need reinforcements.   Patient  Can only come on 5/17/24, will bring magnifying glass & order Prodigy talk glucometer  online ( Not covered)      Will discuss with Dr. Seth   [id number]

## 2024-05-16 NOTE — PROGRESS NOTE ADULT - NSPROGADDITIONALINFOA_GEN_ALL_CORE
Contact via [employer] Teams during business hours  To reach covering provider access AMION via [employer]  For Urgent matters/after-hours/weekends/holidays please page endocrine fellow on call   For nonurgent matters please email [email or web address]    Please note that this patient may be followed by different provider tomorrow.  Notify endocrine 24 hours prior to discharge for final recommendations

## 2024-05-16 NOTE — PROGRESS NOTE ADULT - ASSESSMENT
53F w/h/o uncontrolled T2DM (A1C >15%) on basal/bolus insulin therapy with poor adherence. DM c/b CAD/MI/CVA. Also HTN/HLD. Here with severe sudden-onset bifrontal HA with associated neck stiffness/blurry vision >  admitted with SAH/DKA (AG 20, bicarb 18, BHB 7.3)/UTI.     Endocrinology was consulted for management of diabetes mellitus. Tolerating POs with on/off near hypoglycemia. Will adjust present insulin doses to prevent near hypoglycemia.     Noted antibodies for RIYA are negative. Pt with T2DM and insulin deficiency 2/2 long standing uncontrolled DM and low c-peptide placing pt at risk for DKA.    Spoke to pt about the following:  - Diet: Including carb basics, portion control and consistent PO intake  - Patient has poor vision. Patient reports her vision is getting worse so  was setting up Basaglar insulin pen but patient was not taking it consistently. This concern has been addressed throughout her stay to ensure compliance and a safe home discharge.   - Patient has learned how to use insulin pens by counting clicks for number of units to administer.  coming in tomorrow and will reinforce teaching with him prior to discharge.     Continued with insulin pen teaching given patient has impaired vision. Patient able to perform insulin pen injection by herself without verbal cues x2 now. She is able to verbalize instructions/steps back as well. She feels confident in giving herself her own insulin at home without supervision or help. She does not have any further questions right now. Staff will continue to reinforce education and allow patient to do own finger sticks and insulin administration under RN supervision. Magnifier was provided to patient to assist with reading the insulin pen numbers as needed. Handout provided to patient on how to administer insulin as a reference for home.         53F w/h/o uncontrolled T2DM (A1C >15%) on basal/bolus insulin therapy with poor adherence. DM c/b CAD/MI/CVA. Also HTN/HLD. Here with severe sudden-onset bifrontal HA with associated neck stiffness/blurry vision >  admitted with SAH/DKA (AG 20, bicarb 18, BHB 7.3)/UTI.     Endocrinology was consulted for management of diabetes mellitus. Tolerating POs with on/off near hypoglycemia. Will adjust present insulin doses to prevent near hypoglycemia.     Noted antibodies for RIYA are negative. Pt with T2DM and insulin deficiency 2/2 long standing uncontrolled DM and low c-peptide placing pt at risk for DKA.    Spoke to pt about the following:  - Diet: Including carb basics, portion control and consistent PO intake  - Patient has poor vision. Patient reports her vision is getting worse so  was setting up Basaglar insulin pen but patient was not taking it consistently. This concern has been addressed throughout her stay to ensure compliance and a safe home discharge.   - Patient has learned how to use insulin pens by counting clicks for number of units to administer.  coming in tomorrow and will reinforce teaching with him prior to discharge.     Continued with insulin pen teaching given patient has impaired vision. Patient able to perform insulin pen injection by herself without verbal cues x2 now. She is able to verbalize instructions/steps back as well. She feels confident in giving herself her own insulin at home without supervision or help. She does not have any further questions right now. Staff will continue to reinforce education and allow patient to do own finger sticks and insulin administration under RN supervision. Magnifier was provided to patient to assist with reading the insulin pen numbers as needed in hospital,  as ordered one for home. Handout provided to patient on how to administer insulin as a reference for home.

## 2024-05-16 NOTE — PROGRESS NOTE ADULT - SUBJECTIVE AND OBJECTIVE BOX
53F, no AC/AP, pmh diabetes, p/w severe sudden-onset bifrontal HA this AM with associated neck stiffness/blurry vision. CTH/CTA read as negative by radiologist but appears to have R perimes SAH (HH1, MF1). LP in ED w/ 25299 RBCs, and xanthochromic. Afebrile, WBC wnl. PLTs wnl, Coags wnl.     Overnight event: no acute event     Vital Signs Last 24 Hrs  T(C): 36.7 (16 May 2024 00:41), Max: 36.8 (15 May 2024 16:38)  T(F): 98.1 (16 May 2024 00:41), Max: 98.2 (15 May 2024 16:38)  HR: 77 (16 May 2024 00:41) (77 - 82)  BP: 127/72 (16 May 2024 00:41) (127/72 - 153/86)  BP(mean): --  RR: 18 (16 May 2024 00:41) (18 - 18)  SpO2: 100% (16 May 2024 00:41) (99% - 100%)    Parameters below as of 16 May 2024 00:41  Patient On (Oxygen Delivery Method): room air                              9.4    5.26  )-----------( 512      ( 14 May 2024 12:57 )             29.9           Stroke Core Measures      DRAIN OUTPUT:     NEUROIMAGING:     PHYSICAL EXAM:    General: No Acute Distress     Neurological: Awake, alert oriented to person, place and time, Following Commands, PERRL, EOMI, Face Symmetrical, Speech Fluent, Moving all extremities, Muscle Strength normal in all four extremities, No Drift, Sensation to Light Touch Intact    Pulmonary: Clear to Auscultation, No Rales, No Rhonchi, No Wheezes     Cardiovascular: S1, S2, Regular Rate and Rhythm     Gastrointestinal: Soft, Nontender, Nondistended     Incision:     MEDICATIONS:   Antibiotics:    Neuro:  acetaminophen     Tablet .. 650 milliGRAM(s) Oral every 6 hours PRN Temp greater or equal to 38.5C (101.3F), Mild Pain (1 - 3)  melatonin 3 milliGRAM(s) Oral at bedtime    Anticoagulation:  enoxaparin Injectable 40 milliGRAM(s) SubCutaneous <User Schedule>    Cardiology:  amLODIPine   Tablet 2.5 milliGRAM(s) Oral at bedtime    Endo:   atorvastatin 40 milliGRAM(s) Oral at bedtime  insulin glargine Injectable (LANTUS) 14 Unit(s) SubCutaneous at bedtime  insulin lispro (ADMELOG) corrective regimen sliding scale   SubCutaneous three times a day before meals  insulin lispro (ADMELOG) corrective regimen sliding scale   SubCutaneous at bedtime  insulin lispro Injectable (ADMELOG) 8 Unit(s) SubCutaneous three times a day with meals    Pulm:    GI/:  bisacodyl 5 milliGRAM(s) Oral at bedtime  pantoprazole    Tablet 40 milliGRAM(s) Oral daily  polyethylene glycol 3350 17 Gram(s) Oral every 12 hours  senna 2 Tablet(s) Oral at bedtime    Other:  silver sulfADIAZINE 1% Cream 1 Application(s) Topical two times a day  bisacodyl 5 milliGRAM(s) Oral at bedtime  pantoprazole    Tablet 40 milliGRAM(s) Oral daily  polyethylene glycol 3350 17 Gram(s) Oral every 12 hours  senna 2 Tablet(s) Oral at bedtime

## 2024-05-16 NOTE — PROGRESS NOTE ADULT - SUBJECTIVE AND OBJECTIVE BOX
Patient seen today for follow up inpatient Diabetes Mellitus management.    Chief Complaint: Diabetes Mellitus Type II (I) /Thyroid    INTERVAL HX:  Patient seen today, sitting in chair. She feels well. Patient tolerating PO diet and is eating 3 full meals. Review of blood sugars over the last 24hrs has been within goal range of 100-180mg/dL expect for pre-bed last night to 93 and pre-breakfast to 77.     Review of Systems:  General: As above  GI: No nausea, vomiting  Endocrine: no  S&Sx of hypoglycemia    Allergies    No Known Allergies    Intolerances: none.      MEDICATIONS  (STANDING):  amLODIPine   Tablet 2.5 milliGRAM(s) Oral at bedtime  atorvastatin 40 milliGRAM(s) Oral at bedtime  bisacodyl 5 milliGRAM(s) Oral at bedtime  enoxaparin Injectable 40 milliGRAM(s) SubCutaneous <User Schedule>  insulin glargine Injectable (LANTUS) 14 Unit(s) SubCutaneous at bedtime  insulin lispro (ADMELOG) corrective regimen sliding scale   SubCutaneous three times a day before meals  insulin lispro (ADMELOG) corrective regimen sliding scale   SubCutaneous at bedtime  insulin lispro Injectable (ADMELOG) 8 Unit(s) SubCutaneous three times a day with meals  melatonin 3 milliGRAM(s) Oral at bedtime  pantoprazole    Tablet 40 milliGRAM(s) Oral daily  polyethylene glycol 3350 17 Gram(s) Oral every 12 hours  senna 2 Tablet(s) Oral at bedtime  silver sulfADIAZINE 1% Cream 1 Application(s) Topical two times a day      atorvastatin   40 milliGRAM(s) Oral (05-15-24 @ 21:11)    insulin glargine Injectable (LANTUS)   14 Unit(s) SubCutaneous (05-15-24 @ 21:12)    insulin lispro (ADMELOG) corrective regimen sliding scale   1 Unit(s) SubCutaneous (05-15-24 @ 16:38)    insulin lispro Injectable (ADMELOG)   8 Unit(s) SubCutaneous (05-15-24 @ 17:03)   8 Unit(s) SubCutaneous (05-15-24 @ 11:48)        PHYSICAL EXAM:  VITALS: T(C): 37 (05-16-24 @ 08:00)  T(F): 98.6 (05-16-24 @ 08:00), Max: 98.6 (05-16-24 @ 08:00)  HR: 74 (05-16-24 @ 08:00) (74 - 82)  BP: 132/76 (05-16-24 @ 08:00) (127/72 - 153/86)  RR:  (18 - 18)  SpO2:  (99% - 100%)  Wt(kg): --  GENERAL: sitting in chair, no distress or pain  Respiratory: Respirations unlabored   Extremities: Warm and dry, no edema  NEURO: Alert and oriented, appropriate     LABS:  POCT Blood Glucose.: 180 mg/dL (05-16-24 @ 11:21)  POCT Blood Glucose.: 77 mg/dL (05-16-24 @ 07:50)  POCT Blood Glucose.: 93 mg/dL (05-15-24 @ 21:02)  POCT Blood Glucose.: 178 mg/dL (05-15-24 @ 16:18)  POCT Blood Glucose.: 110 mg/dL (05-15-24 @ 11:34)  POCT Blood Glucose.: 172 mg/dL (05-15-24 @ 08:03)  POCT Blood Glucose.: 149 mg/dL (05-14-24 @ 21:30)  POCT Blood Glucose.: 155 mg/dL (05-14-24 @ 16:16)  POCT Blood Glucose.: 137 mg/dL (05-14-24 @ 12:38)  POCT Blood Glucose.: 73 mg/dL (05-14-24 @ 11:53)  POCT Blood Glucose.: 127 mg/dL (05-14-24 @ 08:08)  POCT Blood Glucose.: 197 mg/dL (05-13-24 @ 21:48)  POCT Blood Glucose.: 89 mg/dL (05-13-24 @ 16:19)                          9.4    5.26  )-----------( 512      ( 14 May 2024 12:57 )             29.9       05-04 Chol 389<H> Direct LDL -- LDL calculated 274<H> HDL 73 Trig 203<H>  Thyroid Function Tests:  05-04 @ 02:59 TSH 2.44 FreeT4 1.1 T3 60 Anti TPO -- Anti Thyroglobulin Ab -- TSI --      A1C with Estimated Average Glucose Result: >15.5 % (05-06-24 @ 18:28)  A1C with Estimated Average Glucose Result: >15.5 % (05-04-24 @ 02:59)    Estimated Average Glucose: >398 mg/dL (05-06-24 @ 18:28)  Estimated Average Glucose: >398 mg/dL (05-04-24 @ 02:59)      Diet, Consistent Carbohydrate/No Snacks:   Supplement Feeding Modality:  Oral  Probiotic Yogurt/Smoothie Cans or Servings Per Day:  2       Frequency:  Daily (05-09-24 @ 09:29) [Active]

## 2024-05-17 ENCOUNTER — TRANSCRIPTION ENCOUNTER (OUTPATIENT)
Age: 54
End: 2024-05-17

## 2024-05-17 VITALS
OXYGEN SATURATION: 100 % | HEART RATE: 82 BPM | DIASTOLIC BLOOD PRESSURE: 83 MMHG | SYSTOLIC BLOOD PRESSURE: 155 MMHG | RESPIRATION RATE: 18 BRPM | TEMPERATURE: 98 F

## 2024-05-17 LAB
GLUCOSE BLDC GLUCOMTR-MCNC: 118 MG/DL — HIGH (ref 70–99)
GLUCOSE BLDC GLUCOMTR-MCNC: 186 MG/DL — HIGH (ref 70–99)
GLUCOSE BLDC GLUCOMTR-MCNC: 259 MG/DL — HIGH (ref 70–99)

## 2024-05-17 PROCEDURE — 84132 ASSAY OF SERUM POTASSIUM: CPT

## 2024-05-17 PROCEDURE — 82962 GLUCOSE BLOOD TEST: CPT

## 2024-05-17 PROCEDURE — 85025 COMPLETE CBC W/AUTO DIFF WBC: CPT

## 2024-05-17 PROCEDURE — 83550 IRON BINDING TEST: CPT

## 2024-05-17 PROCEDURE — 84439 ASSAY OF FREE THYROXINE: CPT

## 2024-05-17 PROCEDURE — 82746 ASSAY OF FOLIC ACID SERUM: CPT

## 2024-05-17 PROCEDURE — 84443 ASSAY THYROID STIM HORMONE: CPT

## 2024-05-17 PROCEDURE — 80048 BASIC METABOLIC PNL TOTAL CA: CPT

## 2024-05-17 PROCEDURE — A9585: CPT

## 2024-05-17 PROCEDURE — 97161 PT EVAL LOW COMPLEX 20 MIN: CPT

## 2024-05-17 PROCEDURE — 96375 TX/PRO/DX INJ NEW DRUG ADDON: CPT

## 2024-05-17 PROCEDURE — 82728 ASSAY OF FERRITIN: CPT

## 2024-05-17 PROCEDURE — 99232 SBSQ HOSP IP/OBS MODERATE 35: CPT

## 2024-05-17 PROCEDURE — 70496 CT ANGIOGRAPHY HEAD: CPT | Mod: MC

## 2024-05-17 PROCEDURE — 82607 VITAMIN B-12: CPT

## 2024-05-17 PROCEDURE — 82330 ASSAY OF CALCIUM: CPT

## 2024-05-17 PROCEDURE — 86900 BLOOD TYPING SEROLOGIC ABO: CPT

## 2024-05-17 PROCEDURE — 36226 PLACE CATH VERTEBRAL ART: CPT

## 2024-05-17 PROCEDURE — 70450 CT HEAD/BRAIN W/O DYE: CPT | Mod: MC

## 2024-05-17 PROCEDURE — 80061 LIPID PANEL: CPT

## 2024-05-17 PROCEDURE — 83735 ASSAY OF MAGNESIUM: CPT

## 2024-05-17 PROCEDURE — 70498 CT ANGIOGRAPHY NECK: CPT | Mod: MC

## 2024-05-17 PROCEDURE — 87086 URINE CULTURE/COLONY COUNT: CPT

## 2024-05-17 PROCEDURE — 36227 PLACE CATH XTRNL CAROTID: CPT

## 2024-05-17 PROCEDURE — 83540 ASSAY OF IRON: CPT

## 2024-05-17 PROCEDURE — 81001 URINALYSIS AUTO W/SCOPE: CPT

## 2024-05-17 PROCEDURE — 84436 ASSAY OF TOTAL THYROXINE: CPT

## 2024-05-17 PROCEDURE — 83010 ASSAY OF HAPTOGLOBIN QUANT: CPT

## 2024-05-17 PROCEDURE — 97165 OT EVAL LOW COMPLEX 30 MIN: CPT

## 2024-05-17 PROCEDURE — 82565 ASSAY OF CREATININE: CPT

## 2024-05-17 PROCEDURE — 87640 STAPH A DNA AMP PROBE: CPT

## 2024-05-17 PROCEDURE — 86901 BLOOD TYPING SEROLOGIC RH(D): CPT

## 2024-05-17 PROCEDURE — 82803 BLOOD GASES ANY COMBINATION: CPT

## 2024-05-17 PROCEDURE — 84480 ASSAY TRIIODOTHYRONINE (T3): CPT

## 2024-05-17 PROCEDURE — 87641 MR-STAPH DNA AMP PROBE: CPT

## 2024-05-17 PROCEDURE — 93306 TTE W/DOPPLER COMPLETE: CPT

## 2024-05-17 PROCEDURE — C1894: CPT

## 2024-05-17 PROCEDURE — 85610 PROTHROMBIN TIME: CPT

## 2024-05-17 PROCEDURE — 85018 HEMOGLOBIN: CPT

## 2024-05-17 PROCEDURE — C1887: CPT

## 2024-05-17 PROCEDURE — 82248 BILIRUBIN DIRECT: CPT

## 2024-05-17 PROCEDURE — 93970 EXTREMITY STUDY: CPT

## 2024-05-17 PROCEDURE — C1769: CPT

## 2024-05-17 PROCEDURE — 72156 MRI NECK SPINE W/O & W/DYE: CPT | Mod: MC

## 2024-05-17 PROCEDURE — 99285 EMERGENCY DEPT VISIT HI MDM: CPT | Mod: 25

## 2024-05-17 PROCEDURE — 93888 INTRACRANIAL LIMITED STUDY: CPT

## 2024-05-17 PROCEDURE — 36224 PLACE CATH CAROTD ART: CPT

## 2024-05-17 PROCEDURE — 82247 BILIRUBIN TOTAL: CPT

## 2024-05-17 PROCEDURE — C1760: CPT

## 2024-05-17 PROCEDURE — 87205 SMEAR GRAM STAIN: CPT

## 2024-05-17 PROCEDURE — 83615 LACTATE (LD) (LDH) ENZYME: CPT

## 2024-05-17 PROCEDURE — 84295 ASSAY OF SERUM SODIUM: CPT

## 2024-05-17 PROCEDURE — 99233 SBSQ HOSP IP/OBS HIGH 50: CPT

## 2024-05-17 PROCEDURE — 83690 ASSAY OF LIPASE: CPT

## 2024-05-17 PROCEDURE — 85027 COMPLETE CBC AUTOMATED: CPT

## 2024-05-17 PROCEDURE — 86850 RBC ANTIBODY SCREEN: CPT

## 2024-05-17 PROCEDURE — 84157 ASSAY OF PROTEIN OTHER: CPT

## 2024-05-17 PROCEDURE — 92523 SPEECH SOUND LANG COMPREHEN: CPT

## 2024-05-17 PROCEDURE — 85730 THROMBOPLASTIN TIME PARTIAL: CPT

## 2024-05-17 PROCEDURE — 83605 ASSAY OF LACTIC ACID: CPT

## 2024-05-17 PROCEDURE — 89051 BODY FLUID CELL COUNT: CPT

## 2024-05-17 PROCEDURE — 84681 ASSAY OF C-PEPTIDE: CPT

## 2024-05-17 PROCEDURE — 97130 THER IVNTJ EA ADDL 15 MIN: CPT

## 2024-05-17 PROCEDURE — 84100 ASSAY OF PHOSPHORUS: CPT

## 2024-05-17 PROCEDURE — 82010 KETONE BODYS QUAN: CPT

## 2024-05-17 PROCEDURE — 83036 HEMOGLOBIN GLYCOSYLATED A1C: CPT

## 2024-05-17 PROCEDURE — 93886 INTRACRANIAL COMPLETE STUDY: CPT

## 2024-05-17 PROCEDURE — 87637 SARSCOV2&INF A&B&RSV AMP PRB: CPT

## 2024-05-17 PROCEDURE — 97164 PT RE-EVAL EST PLAN CARE: CPT

## 2024-05-17 PROCEDURE — 97116 GAIT TRAINING THERAPY: CPT

## 2024-05-17 PROCEDURE — 87070 CULTURE OTHR SPECIMN AEROBIC: CPT

## 2024-05-17 PROCEDURE — 86341 ISLET CELL ANTIBODY: CPT

## 2024-05-17 PROCEDURE — 80053 COMPREHEN METABOLIC PANEL: CPT

## 2024-05-17 PROCEDURE — 74176 CT ABD & PELVIS W/O CONTRAST: CPT | Mod: MC

## 2024-05-17 PROCEDURE — 96374 THER/PROPH/DIAG INJ IV PUSH: CPT

## 2024-05-17 PROCEDURE — 82947 ASSAY GLUCOSE BLOOD QUANT: CPT

## 2024-05-17 PROCEDURE — 70553 MRI BRAIN STEM W/O & W/DYE: CPT | Mod: MC

## 2024-05-17 PROCEDURE — 97530 THERAPEUTIC ACTIVITIES: CPT

## 2024-05-17 PROCEDURE — 97129 THER IVNTJ 1ST 15 MIN: CPT

## 2024-05-17 PROCEDURE — 36415 COLL VENOUS BLD VENIPUNCTURE: CPT

## 2024-05-17 PROCEDURE — 82945 GLUCOSE OTHER FLUID: CPT

## 2024-05-17 PROCEDURE — 82435 ASSAY OF BLOOD CHLORIDE: CPT

## 2024-05-17 PROCEDURE — 85014 HEMATOCRIT: CPT

## 2024-05-17 RX ORDER — INSULIN NPH HUM/REG INSULIN HM 70-30/ML
12 VIAL (ML) SUBCUTANEOUS
Qty: 3 | Refills: 0
Start: 2024-05-17

## 2024-05-17 RX ORDER — AMLODIPINE BESYLATE 2.5 MG/1
1 TABLET ORAL
Qty: 30 | Refills: 0
Start: 2024-05-17 | End: 2024-06-15

## 2024-05-17 RX ORDER — INSULIN ASPART 100 [IU]/ML
6 INJECTION, SOLUTION SUBCUTANEOUS
Qty: 15 | Refills: 0
Start: 2024-05-17 | End: 2024-06-15

## 2024-05-17 RX ORDER — ACETAMINOPHEN 500 MG
2 TABLET ORAL
Qty: 0 | Refills: 0 | DISCHARGE
Start: 2024-05-17

## 2024-05-17 RX ADMIN — PANTOPRAZOLE SODIUM 40 MILLIGRAM(S): 20 TABLET, DELAYED RELEASE ORAL at 11:39

## 2024-05-17 RX ADMIN — Medication 1 APPLICATION(S): at 05:36

## 2024-05-17 RX ADMIN — Medication 6 UNIT(S): at 16:41

## 2024-05-17 RX ADMIN — Medication 6 UNIT(S): at 07:48

## 2024-05-17 RX ADMIN — ENOXAPARIN SODIUM 40 MILLIGRAM(S): 100 INJECTION SUBCUTANEOUS at 16:45

## 2024-05-17 RX ADMIN — Medication 1: at 11:39

## 2024-05-17 RX ADMIN — Medication 3: at 16:40

## 2024-05-17 RX ADMIN — Medication 1 APPLICATION(S): at 16:44

## 2024-05-17 RX ADMIN — Medication 6 UNIT(S): at 11:40

## 2024-05-17 NOTE — PROGRESS NOTE ADULT - PROBLEM SELECTOR PLAN 1
-Test BG ac and hs. Q6H while NPO  - adjust Lantus to 12 units QHS, FBG 77 want to prevent possible hypoglycemia   - adjust Admelog to 6 units with meals hold if NPO or not eating  - Continue low Admelog correction scale ACTID and at HS  - Please continue with insulin pen teaching and prodigy (if  brings meter in) until discharge     - Discharge planning:   Patient has been taught self administration of insulin pen. She is able to manage self administration without supervision or verbal cues.  part of patient education. She will use magnifier and count clicks to ensure correct dosing due to impaired vision. Needs to c/w basal bolus insulin since pt was in DKA at admission and has low c peptide. Will go home with home care. Will use Prodigy glucometer for visually impaired.     - Basal insulin Lantus or equivalent 12units QHS  - Lispro insulin Admelog or equivalent 6units with each meal, hold if not eating  - Doses may need to be adjusted outpatient, patient to call MD if BG <70mg/dL or >250mg/dL consistently.  - Make sure pt has Rx for all DM supplies and insulin> send RX to [facility] for Novolog insulin pen; if not covered by insurance replace it with any other rapid acting analog  - Write rx for Prodigy voicemate glucose meter with strips and lancets> for the visually impaired    Patient has f/u appt at 5/31/2024 and 10/11/2024 at [address]. Phone .  Needs Optho and Podiatry f/u as out patient

## 2024-05-17 NOTE — PROGRESS NOTE ADULT - REASON FOR ADMISSION
severe headache, SAH

## 2024-05-17 NOTE — PROGRESS NOTE ADULT - ASSESSMENT
53F w/h/o uncontrolled T2DM (A1C >15%) on basal/bolus insulin therapy with poor adherence. DM c/b CAD/MI/CVA. Also HTN/HLD. Here with severe sudden-onset bifrontal HA with associated neck stiffness/blurry vision >  admitted with SAH/DKA (AG 20, bicarb 18, BHB 7.3)/UTI.     Endocrinology was consulted for management of diabetes mellitus. Tolerating POs with on/off near hypoglycemia. Will adjust present insulin doses to prevent near hypoglycemia.     Noted antibodies for RIYA are negative. Pt with T2DM and insulin deficiency 2/2 long standing uncontrolled DM and low c-peptide placing pt at risk for DKA.    Spoke to pt about the following:  - Diet: Including carb basics, portion control and consistent PO intake  - Patient has poor vision. Patient reports her vision is getting worse so  was setting up Basaglar insulin pen but patient was not taking it consistently. This concern has been addressed throughout her stay to ensure compliance and a safe home discharge.   - Patient has learned how to use insulin pens by counting clicks for number of units to administer.  present today and teaching provided.       Continued with insulin pen teaching, patient has impaired vision. Patient able to perform insulin pen injection by herself without verbal cues x4 now. She is able to verbalize instructions/steps back as well.  at bedside and able to verbalize steps. She feels confident in giving herself her own insulin at home without supervision or help. Patient or  does not have any further questions right now. Staff will continue to reinforce education and allow patient to do own finger sticks and insulin administration under RN supervision. Magnifier was provided to patient to assist with reading the insulin pen numbers as needed in hospital,  bought one at home.  bought Prodigy glucose meter, has it at home. Needs to be provided lancets/strips for glucometer. Handout provided to patient and  on how to administer insulin as a reference for home.

## 2024-05-17 NOTE — DISCHARGE NOTE PROVIDER - NSDCFUADDINST_GEN_ALL_CORE_FT
Please return immediately to the ED for any of the following: fevers, bleeding, swelling, pain not relieved by medication, increased sluggishness or irritability, increased nausea or vomiting, inability to tolerate foods or liquids, increased numbness/tingling/ or inability to move extremities, seizures, chest pain, shortness of breathe.

## 2024-05-17 NOTE — PROGRESS NOTE ADULT - SUBJECTIVE AND OBJECTIVE BOX
Patient seen today for follow up inpatient Diabetes Mellitus management.    Chief Complaint: Diabetes Mellitus II    INTERVAL HX:  Patient seen in [facility] 463 D1 . Patient is alert and oriented. BG have been stable and mostly at goal 100-180mg/dL while on a Consistent Carbohydrate Diet. Patient has been tolerating diet well. Blood glucose levels in the last 24hrs have been 77-211mg/dL.  at bedside today for teaching. Patient feels she is doing well, is confident in managing her diabetes at home, and would like to go home.    Review of Systems:  General: As above  Respiratory: Denies any SOB, FLORES, or cough.  Gastrointestinal: Denies any n/v/d or abdominal pain.   Endocrine: Denies any polyuria, polydypsia, polyphagia, visual changes, or numbness in feet.     Allergies  No Known Allergies    Intolerances  None.       MEDICATIONS  (STANDING):  acetaminophen     Tablet .. 650 milliGRAM(s) Oral every 6 hours PRN  amLODIPine   Tablet 2.5 milliGRAM(s) Oral at bedtime  atorvastatin 40 milliGRAM(s) Oral at bedtime  bisacodyl 5 milliGRAM(s) Oral at bedtime  enoxaparin Injectable 40 milliGRAM(s) SubCutaneous <User Schedule>  insulin glargine Injectable (LANTUS) 12 Unit(s) SubCutaneous at bedtime  insulin lispro (ADMELOG) corrective regimen sliding scale   SubCutaneous three times a day before meals  insulin lispro (ADMELOG) corrective regimen sliding scale   SubCutaneous at bedtime  insulin lispro Injectable (ADMELOG) 6 Unit(s) SubCutaneous three times a day with meals  melatonin 3 milliGRAM(s) Oral at bedtime  pantoprazole    Tablet 40 milliGRAM(s) Oral daily  polyethylene glycol 3350 17 Gram(s) Oral every 12 hours  senna 2 Tablet(s) Oral at bedtime  silver sulfADIAZINE 1% Cream 1 Application(s) Topical two times a day      atorvastatin 40 milliGRAM(s) Oral at bedtime  insulin glargine Injectable (LANTUS) 12 Unit(s) SubCutaneous at bedtime  insulin lispro (ADMELOG) corrective regimen sliding scale   SubCutaneous at bedtime  insulin lispro (ADMELOG) corrective regimen sliding scale   SubCutaneous three times a day before meals  insulin lispro Injectable (ADMELOG) 6 Unit(s) SubCutaneous three times a day with meals      insulin lispro (ADMELOG) corrective regimen sliding scale   SubCutaneous three times a day before meals  insulin lispro (ADMELOG) corrective regimen sliding scale   SubCutaneous at bedtime  insulin lispro Injectable (ADMELOG) 6 Unit(s) SubCutaneous three times a day with meals      PHYSICAL EXAM:  VITALS:   T(C): 36.7 (05-17-24 @ 08:00), Max: 37.1 (05-16-24 @ 13:25)  HR: 88 (05-17-24 @ 08:00) (79 - 88)  BP: 132/84 (05-17-24 @ 08:00) (118/68 - 132/84)  RR: 18 (05-17-24 @ 08:00) (18 - 18)  SpO2: 97% (05-17-24 @ 08:00) (96% - 100%)    GENERAL: In no acute distress.   Respiratory: Respirations unlabored  Extremities: Warm and dry, no edema  NEURO: Alert and oriented appropriate     LABS:    POCT Blood Glucose.: 186 mg/dL (05-17-24 @ 11:31)  POCT Blood Glucose.: 118 mg/dL (05-17-24 @ 07:27)  POCT Blood Glucose.: 158 mg/dL (05-16-24 @ 22:28)  POCT Blood Glucose.: 211 mg/dL (05-16-24 @ 16:09)  POCT Blood Glucose.: 180 mg/dL (05-16-24 @ 11:21)  POCT Blood Glucose.: 77 mg/dL (05-16-24 @ 07:50)  POCT Blood Glucose.: 93 mg/dL (05-15-24 @ 21:02)  POCT Blood Glucose.: 178 mg/dL (05-15-24 @ 16:18)  POCT Blood Glucose.: 110 mg/dL (05-15-24 @ 11:34)  POCT Blood Glucose.: 172 mg/dL (05-15-24 @ 08:03)  POCT Blood Glucose.: 149 mg/dL (05-14-24 @ 21:30)  POCT Blood Glucose.: 155 mg/dL (05-14-24 @ 16:16)    A1C with Estimated Average Glucose Result: A1C with Estimated Average Glucose Result: >15.5 % (05-06-24 @ 18:28)  A1C with Estimated Average Glucose Result: >15.5 % (05-04-24 @ 02:59)

## 2024-05-17 NOTE — DISCHARGE NOTE PROVIDER - CARE PROVIDER_API CALL
Rajiv Seth  Neurosurgery  [address], [address]  [place] [address]  Phone: [phone]  Fax: [phone]  Follow Up Time:

## 2024-05-17 NOTE — DISCHARGE NOTE PROVIDER - NSDCMRMEDTOKEN_GEN_ALL_CORE_FT
alcohol pads: dispense 1 box  alcohol swabs: Apply topically to affected area 4 times a day  Basaglar KwikPen 100 units/mL subcutaneous solution: 20 unit(s) subcutaneous once a day (at bedtime) unit(s) subcutaneous once a day  Freestyle Florecita 3 Sensors: Please change every 14 days  glucometer: check fs 3x/day  dispense 1 device  glucometer (per patient&#x27;s insurance): Test blood sugars four times a day. Dispense #1 glucometer.  Insulin Pen Needles, 4mm: 1 application subcutaneously 4 times a day. ** Use with insulin pen **  insulin syringe: 1 box  lancets: 1 application subcutaneously 4 times a day  lancets: check fs 3x/day  dispense 1 box  Lipitor 20 mg oral tablet: 1 tab(s) orally once a day   Magnifying glass: Severe Visual acuity impairment  NovoLIN 70/30 subcutaneous suspension: 14 units subcutaneous injection with breakfast  10 units subcutaneous injection with dinner  NovoLOG FlexPen 100 units/mL injectable solution: 10 unit(s) injectable 3 times a day (with meals) hold if not eating or eating less than 50% of meal  Prodigy Autocode blood glucose meter: before meals &amp; bedtime  test strips: test finger stick 3x/day  dispense 1 box   acetaminophen 325 mg oral tablet: 2 tab(s) orally every 6 hours As needed Temp greater or equal to 38.5C (101.3F), Mild Pain (1 - 3)  alcohol pads: dispense 1 box  amLODIPine 2.5 mg oral tablet: 1 tab(s) orally once a day (at bedtime)  Freestyle Florecita 3 Sensors: Please change every 14 days  Insulin Pen Needles, 4mm: 1 application subcutaneously 4 times a day. ** Use with insulin pen **  insulin syringe: 1 box  lancets: check fs 3x/day  dispense 1 box  Lipitor 20 mg oral tablet: 1 tab(s) orally once a day   Magnifying glass: Severe Visual acuity impairment  NovoLIN 70/30 FlexPen subcutaneous suspension: 12 unit(s) subcutaneous once (at bedtime)  NovoLOG FlexPen 100 units/mL injectable solution: 6 unit(s) injectable 3 times a day (with meals) Please hold if not eating or eating less 50% of meal  Prodigy Autocode blood glucose meter: before meals &amp; bedtime  test strips: test finger stick 3x/day  dispense 1 box   acetaminophen 325 mg oral tablet: 2 tab(s) orally every 6 hours As needed Temp greater or equal to 38.5C (101.3F), Mild Pain (1 - 3)  alcohol pads: dispense 1 box  amLODIPine 2.5 mg oral tablet: 1 tab(s) orally once a day (at bedtime)  Freestyle Florecita 3 Sensors: Please change every 14 days  Insulin Pen Needles, 4mm: 1 application subcutaneously 4 times a day. ** Use with insulin pen **  insulin syringe: 1 box  lancets: check fs 3x/day  dispense 1 box  Lipitor 20 mg oral tablet: 1 tab(s) orally once a day   Magnifying glass: Severe Visual acuity impairment  NovoLIN 70/30 FlexPen subcutaneous suspension: 12 unit(s) subcutaneous once (at bedtime)  NovoLOG FlexPen 100 units/mL injectable solution: 6 unit(s) injectable 3 times a day (with meals) Please hold if not eating or eating less 50% of meal  Prodigy Autocode blood glucose meter: before meals &amp; bedtime  Prodigy Lancets: Use as directed  Prodigy Test Strips: Use as directed  test strips: test finger stick 3x/day  dispense 1 box

## 2024-05-17 NOTE — PROGRESS NOTE ADULT - NUTRITIONAL ASSESSMENT
Diet, Consistent Carbohydrate/No Snacks:   Supplement Feeding Modality:  Oral  Probiotic Yogurt/Smoothie Cans or Servings Per Day:  2       Frequency:  Daily (05-09-24 @ 09:29) [Active]      Please see RD assessment and/or follow up.  Managed by primary team as well
Diet, Consistent Carbohydrate/No Snacks:   Supplement Feeding Modality:  Oral  Probiotic Yogurt/Smoothie Cans or Servings Per Day:  2       Frequency:  Daily (05-09-24 @ 09:29) [Active]
Diet, Consistent Carbohydrate/No Snacks (05-04-24 @ 15:15) [Active]      Needs RD consult
Diet, Consistent Carbohydrate/No Snacks:   Supplement Feeding Modality:  Oral  Probiotic Yogurt/Smoothie Cans or Servings Per Day:  2       Frequency:  Daily (05-09-24 @ 09:29) [Active]      Please see RD assessment and/or follow up.  Managed by primary team as well
Diet, Consistent Carbohydrate/No Snacks:   Supplement Feeding Modality:  Oral  Probiotic Yogurt/Smoothie Cans or Servings Per Day:  2       Frequency:  Daily (05-09-24 @ 09:29) [Active]      Please see RD assessment and/or follow up.  Managed by primary team as well.
Diet, Consistent Carbohydrate/No Snacks:   Supplement Feeding Modality:  Oral  Probiotic Yogurt/Smoothie Cans or Servings Per Day:  2       Frequency:  Daily (05-09-24 @ 09:29) [Active]      Please see RD assessment and/or follow up.  Managed by primary team as well
Diet, Consistent Carbohydrate/No Snacks:   Supplement Feeding Modality:  Oral  Probiotic Yogurt/Smoothie Cans or Servings Per Day:  2       Frequency:  Daily (05-09-24 @ 09:29) [Active]      Please see RD assessment and/or follow up.  Managed by primary team as well.
Diet, Consistent Carbohydrate/No Snacks:   Supplement Feeding Modality:  Oral  Probiotic Yogurt/Smoothie Cans or Servings Per Day:  2       Frequency:  Daily (05-09-24 @ 09:29) [Active]
Diet, Consistent Carbohydrate/No Snacks:   Supplement Feeding Modality:  Oral  Probiotic Yogurt/Smoothie Cans or Servings Per Day:  2       Frequency:  Daily (05-09-24 @ 09:29) [Active]

## 2024-05-17 NOTE — PROGRESS NOTE ADULT - SUBJECTIVE AND OBJECTIVE BOX
SUBJECTIVE:   no acute overnight events     Vital Signs Last 24 Hrs  T(C): 36.7 (05-17-24 @ 08:00), Max: 37.1 (05-16-24 @ 13:25)  T(F): 98.1 (05-17-24 @ 08:00), Max: 98.8 (05-16-24 @ 15:48)  HR: 88 (05-17-24 @ 08:00) (79 - 88)  BP: 132/84 (05-17-24 @ 08:00) (118/68 - 132/84)  BP(mean): --  RR: 18 (05-17-24 @ 08:00) (18 - 18)  SpO2: 97% (05-17-24 @ 08:00) (96% - 100%)    PHYSICAL EXAM:    Constitutional: No Acute Distress     Neurological: Awake, alert, Ox3, moving all extremities spontaneously     Pulmonary: Clear to Auscultation    Cardiovascular: Regular rate and rhythm     Gastrointestinal: Soft, Non-tender    Extremities: No calf tenderness bilaterally        IMAGING:   ACC: [id number] EXAM: CT ABDOMEN AND PELVIS ORDERED BY: JONNY BEJARANO    PROCEDURE DATE: 05/13/2024        INTERPRETATION: CLINICAL INFORMATION: Nausea. Rule out intra-abdominal/retroperitoneal hematoma.    COMPARISON: None.    CONTRAST/COMPLICATIONS:  IV Contrast: NONE  Oral Contrast: NONE  Complications: None reported at time of study completion    PROCEDURE:  CT of the Abdomen and Pelvis was performed.  Sagittal and coronal reformats were performed.    FINDINGS:  LOWER CHEST: Within normal limits.    Limited evaluation of solid organs and abdominopelvic viscera in the absence of intravenous contrast.  LIVER: Within normal limits.  BILE DUCTS: Normal caliber.  GALLBLADDER: Contracted. No radiopaque stones.  SPLEEN: Within normal limits.  PANCREAS: Suggestion of pancreatic head fullness relative to the body/tail, with adjacent fat stranding.  ADRENALS: Within normal limits.  KIDNEYS/URETERS: No hydronephrosis. Few punctate nonobstructing stones bilaterally measuring up to 2 mm.    BLADDER: Within normal limits.  REPRODUCTIVE ORGANS: Uterus and adnexa within normal limits.    BOWEL: No bowel obstruction. Appendix is normal.  PERITONEUM: No ascites.  VESSELS: No aortic aneurysm. Minimal scattered atherosclerotic calcifications.  RETROPERITONEUM/LYMPH NODES: No hematoma. No lymphadenopathy.  ABDOMINAL WALL: Mild body wall edema. Small fat-containing umbilical hernia.  BONES: Within normal limits.    IMPRESSION:  No hematoma in abdomen or pelvis.  Suggestion of pancreatic head fullness with mild adjacent fat stranding. Differential possibilities include acute pancreatitis, cannot exclude underlying pancreatic lesion by noncontrast assessment. Recommend lipase correlation. Advise follow-up CT abdomen pancreas protocol with intravenous contrast.        --- End of Report ---            CORBIN ROB MD; Attending Radiologist  This document has been electronically signed. May 14 2024 7:46AM    MEDICATIONS:  Neuro:  acetaminophen     Tablet .. 650 milliGRAM(s) Oral every 6 hours PRN Temp greater or equal to 38.5C (101.3F), Mild Pain (1 - 3)  melatonin 3 milliGRAM(s) Oral at bedtime    Cardiac:  amLODIPine   Tablet 2.5 milliGRAM(s) Oral at bedtime    GI/:  bisacodyl 5 milliGRAM(s) Oral at bedtime  pantoprazole    Tablet 40 milliGRAM(s) Oral daily  polyethylene glycol 3350 17 Gram(s) Oral every 12 hours  senna 2 Tablet(s) Oral at bedtime    Other:   atorvastatin 40 milliGRAM(s) Oral at bedtime  enoxaparin Injectable 40 milliGRAM(s) SubCutaneous <User Schedule>  insulin glargine Injectable (LANTUS) 12 Unit(s) SubCutaneous at bedtime  insulin lispro (ADMELOG) corrective regimen sliding scale   SubCutaneous three times a day before meals  insulin lispro (ADMELOG) corrective regimen sliding scale   SubCutaneous at bedtime  insulin lispro Injectable (ADMELOG) 6 Unit(s) SubCutaneous three times a day with meals  silver sulfADIAZINE 1% Cream 1 Application(s) Topical two times a day        DIET: CCD/No Snacks

## 2024-05-17 NOTE — PROGRESS NOTE ADULT - NSPROGADDITIONALINFOA_GEN_ALL_CORE
Contact via Microsoft Teams during business hours  To reach covering provider access AMION via sunrise tools  For Urgent matters/after-hours/weekends/holidays please page endocrine fellow on call   For nonurgent matters please email [email or web address]    Please note that this patient may be followed by different provider tomorrow.  Notify endocrine 24 hours prior to discharge for final recommendations Provided face to face education which was more than 50% of encounter that also included assessing patient/labs/meds and discussing plan of care with patient/primary care team.   Adjusting insulin  Discharge plan   Follow up care     Contact via Microsoft Teams during business hours  To reach covering provider access AMION via sunrise tools  For Urgent matters/after-hours/weekends/holidays please page endocrine fellow on call   For nonurgent matters please email [email or web address]    Please note that this patient may be followed by different provider tomorrow.  Notify endocrine 24 hours prior to discharge for final recommendations

## 2024-05-17 NOTE — PROGRESS NOTE ADULT - PROBLEM SELECTOR PROBLEM 1
Uncontrolled type 2 diabetes mellitus with ketoacidosis, with long-term current use of insulin
SAH (subarachnoid hemorrhage)
Uncontrolled type 2 diabetes mellitus with ketoacidosis, with long-term current use of insulin
SAH (subarachnoid hemorrhage)
Fever

## 2024-05-17 NOTE — PROGRESS NOTE ADULT - ASSESSMENT
ASSESSMENT: HPI:  Jessica Wolff  53F, no AC/AP, pmh diabetes, p/w severe sudden-onset bifrontal HA this AM with associated neck stiffness/blurry vision. CTH/CTA read as negative by radiologist but appears to have R perimes SAH (HH1, MF1). LP in ED w/ 32194 RBCs, and xanthochromic. Afebrile, WBC wnl. PLTs wnl, Coags wnl.     NEURO:  q4 neurochecks/q4 vitals   5/11 CTH: Resolution of prepontine cistern subarachnoid hemorrhage. Minimal trace subarachnoid hemorrhage is suggested in the left medial occipital region. No new or worsening intracranial hemorrhage.  5/4 MRI Brain done   Pain control prn Tylenol   OOB as tolerated     PULM:  Incentive spirometry, mobilize as tolerated    CV:  Keep -150mmHg, d/c a-line in AM    RENAL:  IVL    GI:  Diet: CCD  GI prophylaxis: Protonix   Bowel regimen: senna, miralax   GI consult for "black" stools prior to hospital admission - No GI intervention, will need outpatient f/u for EGD/Colonoscopy   Last BM 5/16 PM per patient     ENDO:   Goal euglycemia (-180)  Endo following - Continue Lantus 12 units qhs, Admelog 6 units TID    HEME/ONC:  VTE prophylaxis: SCD, SQL   H/H stable     ID:  Afebrile     SOCIAL/FAMILY:   to arrive later this morning - will update     Patient will require a cane at home due to SAH to help complete MRADSs. (mobility related activities for daily living).      DISPOSITION:  D/C Home today   Pt w severe visual acuity deficit. She has been getting diabetic teaching. Patient able to show teach back. Need reinforcements.   Patient  to arrive today for teaching, will bring magnifying glass & order Prodigy talk glucometer online (Not covered)   ASSESSMENT: HPI:  Jessica Wolff  53F, no AC/AP, pmh diabetes, p/w severe sudden-onset bifrontal HA this AM with associated neck stiffness/blurry vision. CTH/CTA read as negative by radiologist but appears to have R perimes SAH (HH1, MF1). LP in ED w/ 31917 RBCs, and xanthochromic. Afebrile, WBC wnl. PLTs wnl, Coags wnl.     NEURO:  q4 neurochecks/q4 vitals   5/11 CTH: Resolution of prepontine cistern subarachnoid hemorrhage. Minimal trace subarachnoid hemorrhage is suggested in the left medial occipital region. No new or worsening intracranial hemorrhage.  5/4 MRI Brain done   Pain control prn Tylenol   OOB as tolerated     PULM:  Incentive spirometry, mobilize as tolerated    CV:  Keep -150mmHg, d/c a-line in AM    RENAL:  IVL    GI:  Diet: CCD  GI prophylaxis: Protonix   Bowel regimen: senna, miralax   GI consult for "black" stools prior to hospital admission - No GI intervention, will need outpatient f/u for EGD/Colonoscopy   Last BM 5/16 PM per patient     ENDO:   Goal euglycemia (-180)  Endo following - Continue Lantus 12 units qhs, Admelog 6 units TID    HEME/ONC:  VTE prophylaxis: SCD, SQL   H/H stable     ID:  Afebrile     SOCIAL/FAMILY:   to arrive later this morning - will update     Patient will require a cane at home due to SAH to help complete MRADSs. (mobility related activities for daily living).      DISPOSITION:  D/C Home today   Pt w/ severe visual acuity deficit. She has been getting diabetic teaching. Patient able to show teach back.   Patient and  showed understanding/competence with RN and Endo at bedside.   Will D/C home today    ASSESSMENT: HPI:  Jessica Wolff  53F, no AC/AP, pmh diabetes, p/w severe sudden-onset bifrontal HA this AM with associated neck stiffness/blurry vision. CTH/CTA read as negative by radiologist but appears to have R perimes SAH (HH1, MF1). LP in ED w/ 48899 RBCs, and xanthochromic. Afebrile, WBC wnl. PLTs wnl, Coags wnl.     NEURO:  q4 neurochecks/q4 vitals   5/11 CTH: Resolution of prepontine cistern subarachnoid hemorrhage. Minimal trace subarachnoid hemorrhage is suggested in the left medial occipital region. No new or worsening intracranial hemorrhage.  5/4 MRI Brain done   Pain control prn Tylenol   OOB as tolerated     PULM:  Incentive spirometry, mobilize as tolerated    CV:  Keep -150mmHg, d/c a-line in AM    RENAL:  IVL    GI:  Diet: CCD  GI prophylaxis: Protonix   Bowel regimen: senna, miralax   GI consult for "black" stools prior to hospital admission - No GI intervention, will need outpatient f/u for EGD/Colonoscopy   Last BM 5/16 PM per patient     ENDO:   Goal euglycemia (-180)  Endo following - Continue Lantus 12 units qhs, Admelog 6 units TID    HEME/ONC:  VTE prophylaxis: SCD, SQL   H/H stable     ID:  Afebrile     SOCIAL/FAMILY:   to arrive later this morning - will update     Patient will require a cane at home due to SAH to help complete MRADSs. (mobility related activities for daily living).      DISPOSITION:  D/C Home today   Continued with insulin pen teaching, patient has impaired vision. Patient able to perform insulin pen injection by herself without verbal cues x4 now. She is able to verbalize instructions/steps back as well.  at bedside and able to verbalize steps. She feels confident in giving herself her own insulin at home without supervision or help. Patient or  does not have any further questions right now. Magnifier was provided to patient to assist with reading the insulin pen numbers as needed in hospital,  bought one at home.  bought Prodigy glucose meter, has it at home.  bought lancets/strips for glucometer. Handout provided to patient and  on how to administer insulin as a reference for home

## 2024-05-17 NOTE — PROGRESS NOTE ADULT - THIS PATIENT HAS THE FOLLOWING CONDITION(S)/DIAGNOSES ON THIS ADMISSION:
None
SAH
None
SAH
SAH
None
None
SAH
None
SAH

## 2024-05-17 NOTE — DISCHARGE NOTE PROVIDER - NSDCFUSCHEDAPPT_GEN_ALL_CORE_FT
Veena German  [facility]  [address]  Scheduled Appointment: 05/31/2024

## 2024-05-17 NOTE — PROGRESS NOTE ADULT - PROBLEM SELECTOR PROBLEM 3
HLD (hyperlipidemia)
Pancreatic abnormality
HLD (hyperlipidemia)

## 2024-05-17 NOTE — DISCHARGE NOTE PROVIDER - NSRESEARCHGRANT_OVERRIDEREC_GEN_A_CORE
No, they do not need to be tested or on an antibiotic. This is a surgical and/or non-medical patient.

## 2024-05-17 NOTE — PROGRESS NOTE ADULT - PROVIDER SPECIALTY LIST ADULT
Endocrinology
Endocrinology
NSICU
NSICU
Neurosurgery
Endocrinology
Hospitalist
NSICU
Neurosurgery
Hospitalist
NSICU
Neurosurgery
Hospitalist
NSICU
NSICU
Neurosurgery
NSICU
Neurosurgery
Endocrinology

## 2024-05-17 NOTE — DISCHARGE NOTE PROVIDER - NSDCCPCAREPLAN_GEN_ALL_CORE_FT
PRINCIPAL DISCHARGE DIAGNOSIS  Diagnosis: SAH (subarachnoid hemorrhage)  Assessment and Plan of Treatment: You were found to have a subarachnoid hemorrhage. You underwent an cerebral angiogram with Dr. Seth on 5/4/24. Please follow up with Dr. Seth within 1-2 weeks of discharge.  Please notify physician if fevers, bleeding, swelling, pain not relieved by medication, increased sluggishness or irritability, increased nausea or vomiting, inability to tolerate foods or liquids, new or increasing weakness/numbness/tingling.  Please do not engage in strenuous activity, heavy lifting, drive, or return to work or school until cleared by surgeon.  Do not take Aspirin, Ibuprofen (Motrin), Naproxen (Aleve) or Meloxicam for pain.         SECONDARY DISCHARGE DIAGNOSES  Diagnosis: Pancreatic abnormality  Assessment and Plan of Treatment: On CT Abdomen and Pelvis you were found to have pancreatic head fullness with mild adjacent fat stranding.   Please follow up with your PCP for follow up imaging. (CT abdomen pancreas protocol with intravenous contrast).   Please also follow up with Dr. Anjel Pederson (gastroenterologist) within 1-2 weeks of discharge.  Please call [phone] to make an appt  [address] [place] [id number]    Diagnosis: HLD (hyperlipidemia)  Assessment and Plan of Treatment: Please continue to take all your medications as prescribed. Follow up with your PCP within 1-2 weeks of discharge.    Diagnosis: HTN (hypertension)  Assessment and Plan of Treatment: Please continue to take Amlodpine daily. Please take your blood pressure at home and hold for a systolic blood pressure less than 110 (top number). You may buy a blood pressure cuff over the counter([employer] or [employer]) if you do not have one.    Diagnosis: Type 2 diabetes mellitus  Assessment and Plan of Treatment: You have been taught self administration of an insulin pen. Please continue to use magnifier and count clicks to ensure correct dosing due to impaired vision.   Please take 6 units of insulin (Novolog) with every meal.   Please take 12 units of insulin (Basaglar) at night.   Please call your doctor if your blood surgar is <70mg/dL or >250mg/dL consistently.  Please follow up with an ophthamologist and a podiatrist as an outpatient as well as your primary care provider (Dr. Fay Wilson).   Please make an appt with endocrinology within 1 week of discharge. If unable to make an appt please follow up with your PCP:  [address]   Phone number: [phone]

## 2024-05-17 NOTE — PROGRESS NOTE ADULT - PROBLEM SELECTOR PLAN 2
BP goal <130/80  Manage per primary team
Baseline appears to be 11-12. No MARY. hgb stable.  Black stools PTA. light brown on exam. outpt GI f/u per consult note. may have mild hemolysis based on low haptoglobin. will need to complete hemolysis w/u but if patient will be discharged to rehab can monitor Hgb/hemolysis labs and check direct britni at rehab.   Outpatient f/u with PCP.
A1c 15. with retinopathy, neuropathy. s/p insulin gtt for DKA   Despite DM being diagnosed over 10 years ago, it appears patient had been lost to f/u  Was on novolin outpatient   Endocrine following, recs appreciated. on lantus, premeal and ISS but with recurrent hypoglycemia  Insulin adjustment per endo  Outpatient f/u with optho for blurry vision - present for over 1 year and has been told this is d/t DM  Outpatient f/u with podiatry.
BP goal <130/80  Manage per primary team
BP goal <130/80  Manage per primary team
A1c 15. with retinopathy, neuropathy. s/p insulin gtt for DKA   Despite DM being diagnosed over 10 years ago, it appears patient had been lost to f/u  Was on novolin outpatient   Endocrine following, recs appreciated. on 20u lantus, 12-12-10 premeal and ISS  Outpatient f/u with optho for blurry vision - present for over 1 year and has been told this is d/t DM  Outpatient f/u with podiatry.   Offered gabapentin for neuropathy symptoms but patient says it is not too bothersome and would prefer to avoid more medications.
BP goal <130/80  Manage per primary team

## 2024-05-17 NOTE — PROGRESS NOTE ADULT - PROBLEM SELECTOR PROBLEM 2
HTN (hypertension)
Type 2 diabetes mellitus
HTN (hypertension)
Anemia
Type 2 diabetes mellitus

## 2024-05-17 NOTE — DISCHARGE NOTE PROVIDER - HOSPITAL COURSE
52 y/o female, no AC/AP, PMH diabetes; p/w severe sudden-onset bifrontal headache on 5/3/24 with associated neck stiffness/blurry vision. CTH/CTA done and read as small subarachnoid hemorrhage is present ventral to the brainstem. LP in ED w/ 15111 RBCs, and xanthochromic. Pt admitted to [facility] for SAH management. Was found to be in diabetic ketoacidosis and placed on an insulin drip. Pt underwent a cerebral angiogram on 5/4 that was negative. Pt was transitioned off the insulin drip and switched to admelog and lantus with a sliding scale. Endocrinology was consulted for co-management. Pt was placed on antibiotics for a UTI. Pt completed a 3 day course. Transcranial dopplers were done on the patient and within normal limits. Pt transferred to the floor on 5/10. Hospitalist following for co-management. Pt with anemia while hospitalized. GI consulted for history of dark stools. Not complaining of any current symptoms and rec'd for outpatient follow up. CT A/P done and showed suggestion of pancreatic head fullness with mild adjacent fat stranding. Differential possibilities include acute pancreatitis, cannot exclude underlying pancreatic lesion by noncontrast assessment. pt evaluated by PT/OT and rec'd for outpatient PT/OT.  At the time of discharge, the pt was medically and neurologically stable.

## 2024-05-20 PROBLEM — Z78.9 OTHER SPECIFIED HEALTH STATUS: Chronic | Status: INACTIVE | Noted: 2021-03-26 | Resolved: 2024-05-07

## 2024-05-29 PROBLEM — Z00.00 ENCOUNTER FOR PREVENTIVE HEALTH EXAMINATION: Status: ACTIVE | Noted: 2024-05-29

## 2024-05-31 ENCOUNTER — APPOINTMENT (OUTPATIENT)
Dept: ENDOCRINOLOGY | Facility: CLINIC | Age: 54
End: 2024-05-31

## 2024-05-31 VITALS
DIASTOLIC BLOOD PRESSURE: 70 MMHG | HEIGHT: 62 IN | OXYGEN SATURATION: 98 % | WEIGHT: 150 LBS | HEART RATE: 70 BPM | SYSTOLIC BLOOD PRESSURE: 126 MMHG | BODY MASS INDEX: 27.6 KG/M2

## 2024-05-31 DIAGNOSIS — E13.10 OTHER SPECIFIED DIABETES MELLITUS WITH KETOACIDOSIS W/OUT COMA: ICD-10-CM

## 2024-05-31 LAB
CREAT SPEC-SCNC: 71 MG/DL
GLUCOSE BLDC GLUCOMTR-MCNC: 199
MICROALBUMIN 24H UR DL<=1MG/L-MCNC: 269 MG/DL
MICROALBUMIN/CREAT 24H UR-RTO: 3779 MG/G

## 2024-05-31 PROCEDURE — 99215 OFFICE O/P EST HI 40 MIN: CPT | Mod: 25

## 2024-05-31 PROCEDURE — G2211 COMPLEX E/M VISIT ADD ON: CPT | Mod: NC

## 2024-05-31 PROCEDURE — 82962 GLUCOSE BLOOD TEST: CPT

## 2024-05-31 RX ORDER — INSULIN GLARGINE 100 [IU]/ML
100 INJECTION, SOLUTION SUBCUTANEOUS
Qty: 1 | Refills: 1 | Status: ACTIVE | COMMUNITY
Start: 2024-05-31 | End: 1900-01-01

## 2024-06-02 NOTE — HISTORY OF PRESENT ILLNESS
[FreeTextEntry1] : 55 yo  with PMH of here for Type 2 Diabetes.  Diagnosis: T2DM >14 years ago, now with low c-peptide 0.2 () Previous Endo: none, was managed by PCP Adherence: used to be non-adherent to insulin  A1c: >15% (5/2024).  Last labs:  Current DM Medications: -?Novolin 12 units qhs (adherent)  -Novolog 6 units qac (takes right at start of meal)   Past DM medications:  -had never been on Novolin before per patient -Used to take Basaglar up to 20 units with Novolog 20 units qac (has been on insulin for many years)  Complications: +DKA or other hospitalizations for DM. Denies CAD or CVA.  Eyes: Last ophthalmology visit 5/31/2024. +Diabetic retinopathy, reports was told she has swelling and bleeding in the eye and needs surgery. +Has blurry vision. Feet: Has not seen podiatry before. +Stabbing sensation under toes, funny feeling in fingers too. Reports some swelling in legs. Denies h/o foot ulcers or sores. Nephrology: eGFR 72 (5/13/2024). Denies h/o nephropathy. Denies h/o UTIs or genital mycotic infections. +UTI comes and goes, last UTI (5/2024). Also has genital itching. +Reports slow stream when urinating. Denies polyuria or polydipsia. Used to have frequent urination.  Weight Change: gradual weight gain since starting insulin, currently 150 lbs.  SMBG (using 's Freestyle Lite since last week, ran out of Prodigy voicemeter test strips): checks 4x/day fasting- 110, 152, 49, 94, 200, 171 pre-lunch- 105, 203, 78, POCT 199 today pre-dinner- 129, 182 bedtime- 172, 304, 306, 311 Hypoglycemia: One time recently fasting was 49, mildly weak but mostly asymptomatic.   Current Diet: 3 meals a day, sometimes appetite not so good  breakfast (8:30am)- scrambled egg and 1 slice estevan bread lunch (12-12:30pm)- sometimes 1 slice of estevan bread with tomato and lettuce, with artichoke spread, sometimes stuffed bell pepper with fish, salad dinner (5-6pm)- casava, soup, no pasta yet, a lot of veggies/salad, fish, sometimes quinoa snacks- oreos only in case of hypos, plain tea before bed drinks- mostly water, or juicing carrots/beets, no soda or regular juice  Exercise: not much   PMH: s/p cardiac cath? 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

## 2024-07-05 ENCOUNTER — APPOINTMENT (OUTPATIENT)
Dept: ENDOCRINOLOGY | Facility: CLINIC | Age: 54
End: 2024-07-05
Payer: COMMERCIAL

## 2024-07-05 VITALS
HEART RATE: 83 BPM | WEIGHT: 148 LBS | DIASTOLIC BLOOD PRESSURE: 88 MMHG | HEIGHT: 62 IN | SYSTOLIC BLOOD PRESSURE: 150 MMHG | OXYGEN SATURATION: 99 % | BODY MASS INDEX: 27.23 KG/M2

## 2024-07-05 DIAGNOSIS — E13.10 OTHER SPECIFIED DIABETES MELLITUS WITH KETOACIDOSIS W/OUT COMA: ICD-10-CM

## 2024-07-05 DIAGNOSIS — E78.5 HYPERLIPIDEMIA, UNSPECIFIED: ICD-10-CM

## 2024-07-05 DIAGNOSIS — R80.9 PROTEINURIA, UNSPECIFIED: ICD-10-CM

## 2024-07-05 DIAGNOSIS — I10 ESSENTIAL (PRIMARY) HYPERTENSION: ICD-10-CM

## 2024-07-05 LAB
ALBUMIN SERPL ELPH-MCNC: 3.7 G/DL
ALP BLD-CCNC: 126 U/L
ALT SERPL-CCNC: 17 U/L
ANION GAP SERPL CALC-SCNC: 10 MMOL/L
AST SERPL-CCNC: 20 U/L
BILIRUB SERPL-MCNC: 0.2 MG/DL
BUN SERPL-MCNC: 23 MG/DL
CALCIUM SERPL-MCNC: 10 MG/DL
CHLORIDE SERPL-SCNC: 103 MMOL/L
CHOLEST SERPL-MCNC: 293 MG/DL
CO2 SERPL-SCNC: 28 MMOL/L
CREAT SERPL-MCNC: 1.15 MG/DL
CREAT SPEC-SCNC: 92 MG/DL
EGFR: 57 ML/MIN/1.73M2
GLUCOSE SERPL-MCNC: 166 MG/DL
HDLC SERPL-MCNC: 105 MG/DL
LDLC SERPL CALC-MCNC: 171 MG/DL
MICROALBUMIN 24H UR DL<=1MG/L-MCNC: 244 MG/DL
MICROALBUMIN/CREAT 24H UR-RTO: 2639 MG/G
NONHDLC SERPL-MCNC: 188 MG/DL
POTASSIUM SERPL-SCNC: 4.4 MMOL/L
PROT SERPL-MCNC: 6.5 G/DL
SODIUM SERPL-SCNC: 141 MMOL/L
TRIGL SERPL-MCNC: 102 MG/DL
VIT B12 SERPL-MCNC: 835 PG/ML

## 2024-07-05 PROCEDURE — G2211 COMPLEX E/M VISIT ADD ON: CPT | Mod: NC

## 2024-07-05 PROCEDURE — 99214 OFFICE O/P EST MOD 30 MIN: CPT | Mod: 25

## 2024-07-05 PROCEDURE — 82962 GLUCOSE BLOOD TEST: CPT

## 2024-07-05 PROCEDURE — 83036 HEMOGLOBIN GLYCOSYLATED A1C: CPT | Mod: QW

## 2024-07-06 PROBLEM — I10 HYPERTENSION: Status: ACTIVE | Noted: 2024-07-06

## 2024-07-06 PROBLEM — E78.5 HYPERLIPIDEMIA: Status: ACTIVE | Noted: 2024-07-06

## 2024-07-06 PROBLEM — R80.9 POSITIVE FOR MACROALBUMINURIA: Status: ACTIVE | Noted: 2024-07-06

## 2024-07-06 LAB
GLUCOSE BLDC GLUCOMTR-MCNC: 249
HBA1C MFR BLD HPLC: 9.5

## 2024-07-06 RX ORDER — BLOOD-GLUCOSE CONTROL, LOW
EACH MISCELLANEOUS
Qty: 100 | Refills: 0 | Status: ACTIVE | COMMUNITY
Start: 2024-06-11

## 2024-07-06 RX ORDER — BLOOD SUGAR DIAGNOSTIC
STRIP MISCELLANEOUS 3 TIMES DAILY
Qty: 300 | Refills: 3 | Status: ACTIVE | COMMUNITY
Start: 2024-07-06 | End: 1900-01-01

## 2024-07-06 RX ORDER — ATORVASTATIN CALCIUM 40 MG/1
40 TABLET, FILM COATED ORAL
Qty: 90 | Refills: 0 | Status: ACTIVE | COMMUNITY
Start: 2024-06-12

## 2024-07-06 RX ORDER — INSULIN ASPART 100 [IU]/ML
100 INJECTION, SOLUTION INTRAVENOUS; SUBCUTANEOUS
Qty: 2 | Refills: 0 | Status: ACTIVE | COMMUNITY
Start: 2024-05-17 | End: 1900-01-01

## 2024-07-08 LAB — ISLET CELL512 AB SER-SCNC: 0 NMOL/L

## 2024-08-09 ENCOUNTER — APPOINTMENT (OUTPATIENT)
Dept: ENDOCRINOLOGY | Facility: CLINIC | Age: 54
End: 2024-08-09

## 2024-08-09 PROCEDURE — G0108 DIAB MANAGE TRN  PER INDIV: CPT

## 2024-09-19 NOTE — PHYSICAL EXAM
[Alert] : alert [No Acute Distress] : no acute distress [Normal Sclera/Conjunctiva] : normal sclera/conjunctiva [No Proptosis] : no proptosis [Thyroid Not Enlarged] : the thyroid was not enlarged [No Respiratory Distress] : no respiratory distress [No Accessory Muscle Use] : no accessory muscle use [Clear to Auscultation] : lungs were clear to auscultation bilaterally [Normal S1, S2] : normal S1 and S2 [Normal Rate] : heart rate was normal [Regular Rhythm] : with a regular rhythm [No Edema] : no peripheral edema [Normal Bowel Sounds] : normal bowel sounds [Not Tender] : non-tender [Soft] : abdomen soft [No Spinal Tenderness] : no spinal tenderness [Normal Gait] : normal gait [No Rash] : no rash [No Tremors] : no tremors [Oriented x3] : oriented to person, place, and time

## 2024-09-20 ENCOUNTER — APPOINTMENT (OUTPATIENT)
Dept: ENDOCRINOLOGY | Facility: CLINIC | Age: 54
End: 2024-09-20

## 2024-09-20 ENCOUNTER — RX RENEWAL (OUTPATIENT)
Age: 54
End: 2024-09-20

## 2024-09-20 NOTE — ASSESSMENT
[Diabetes Foot Care] : diabetes foot care [Long Term Vascular Complications] : long term vascular complications of diabetes [Carbohydrate Consistent Diet] : carbohydrate consistent diet [Importance of Diet and Exercise] : importance of diet and exercise to improve glycemic control, achieve weight loss and improve cardiovascular health [Exercise/Effect on Glucose] : exercise/effect on glucose [Hypoglycemia Management] : hypoglycemia management [Self Monitoring of Blood Glucose] : self monitoring of blood glucose [Retinopathy Screening] : Patient was referred to ophthalmology for retinopathy screening [FreeTextEntry1] : 55 yo F with PMH of HLD, HTN, SAH (5/2024), s/p cardiac cath?, here for follow-up of Diabetes   plan: basaglar 15 units qhs novolog 8/8/8  Diabetes: 9.5% (7/2024) -> A1c today %*** - C-peptide 0.2 low with gluc 121 (5/11/2024), indicative of low endogenous insulin production. EBEN/ZT8/ICA wnl (5/4/2024).  - Will repeat glc and c-peptide level today  Medication plan: - Increase Basaglar to *** units qhs. - Will adjust Novolog to *** units qac.  - Discussed hypoglycemia management.  - Discussed diet modification, carb consistent diet, and exercise. - Start monitoring BG using Florecita 3 - Discussed target A1c<7%, target fasting BG  and target 2 hour postprandial BG<180 Monitoring for complications: Retinopathy screening: UTD with ophthalmology per patient report Feet: no active issues, exam today wnl*** Nephropathy screening: elevated UACr 2639 in July 2024, ***referred to nephrology at last visit    Counseling: We discussed diabetes foot care, long term complications of diabetes including but not limited to neuropathy, nephropathy, retinopathy and cardiovascular disease and the benefits of good glycemic control in preventing said complications. We discussed the risks and benefits of diabetes medications and/or insulin as relevant for today, prevention and management of hypoglycemia, importance of medication compliance and blood glucose monitoring.  HTN: - Goal BP <130/80, BP *** today - On amlodipine ***   HLD: - Goal LDL <70. , ,  (7/2024). - On atorvastatin 40 mg daily (started 5/4/2024). - Plan increase atorvastatin to 80 mg daily ***

## 2024-09-24 ENCOUNTER — RX RENEWAL (OUTPATIENT)
Age: 54
End: 2024-09-24

## 2024-10-11 ENCOUNTER — APPOINTMENT (OUTPATIENT)
Dept: ENDOCRINOLOGY | Facility: CLINIC | Age: 54
End: 2024-10-11

## 2024-11-08 NOTE — H&P ADULT - REASON FOR ADMISSION
severe headache, SAH Detail Level: Detailed Depth Of Biopsy: dermis Was A Bandage Applied: Yes Size Of Lesion In Cm: 0 Biopsy Type: H and E Biopsy Method: Personna blade Anesthesia Type: 1% lidocaine with 1:100,000 epinephrine Anesthesia Volume In Cc: 0.1 Hemostasis: Aluminum Chloride Wound Care: Aquaphor Dressing: bandage Destruction After The Procedure: No Type Of Destruction Used: Electrodesiccation Curettage Text: The wound bed was treated with curettage after the biopsy was performed. Cryotherapy Text: The wound bed was treated with cryotherapy after the biopsy was performed. Electrodesiccation Text: The wound bed was treated with electrodesiccation after the biopsy was performed. Electrodesiccation And Curettage Text: The wound bed was treated with electrodesiccation and curettage after the biopsy was performed. Silver Nitrate Text: The wound bed was treated with silver nitrate after the biopsy was performed. Lab: 6 Lab Facility: 3 Consent was obtained and risks were reviewed including but not limited to scarring, infection, bleeding, scabbing, incomplete removal, nerve damage and allergy to anesthesia. Post-Care Instructions: I reviewed with the patient in detail post-care instructions. Patient is to keep the biopsy site dry overnight, and then apply Aquaphor or Vaseline daily until healed. Patient given post care instructions handout. Notification Instructions: Patient will be notified of biopsy results. However, patient instructed to call the office if not contacted within 2 weeks. Billing Type: Third-Party Bill Information: Selecting Yes will display possible errors in your note based on the variables you have selected. This validation is only offered as a suggestion for you. PLEASE NOTE THAT THE VALIDATION TEXT WILL BE REMOVED WHEN YOU FINALIZE YOUR NOTE. IF YOU WANT TO FAX A PRELIMINARY NOTE YOU WILL NEED TO TOGGLE THIS TO 'NO' IF YOU DO NOT WANT IT IN YOUR FAXED NOTE.

## 2025-02-13 ENCOUNTER — RX RENEWAL (OUTPATIENT)
Age: 55
End: 2025-02-13

## 2025-06-03 ENCOUNTER — EMERGENCY (EMERGENCY)
Facility: HOSPITAL | Age: 55
LOS: 1 days | End: 2025-06-03
Attending: EMERGENCY MEDICINE | Admitting: EMERGENCY MEDICINE
Payer: COMMERCIAL

## 2025-06-03 VITALS
DIASTOLIC BLOOD PRESSURE: 87 MMHG | RESPIRATION RATE: 16 BRPM | OXYGEN SATURATION: 100 % | SYSTOLIC BLOOD PRESSURE: 187 MMHG | HEART RATE: 91 BPM

## 2025-06-03 VITALS
RESPIRATION RATE: 17 BRPM | TEMPERATURE: 98 F | OXYGEN SATURATION: 96 % | SYSTOLIC BLOOD PRESSURE: 187 MMHG | HEIGHT: 66 IN | DIASTOLIC BLOOD PRESSURE: 116 MMHG | HEART RATE: 94 BPM | WEIGHT: 165.35 LBS

## 2025-06-03 DIAGNOSIS — E11.9 TYPE 2 DIABETES MELLITUS WITHOUT COMPLICATIONS: ICD-10-CM

## 2025-06-03 DIAGNOSIS — R20.0 ANESTHESIA OF SKIN: ICD-10-CM

## 2025-06-03 DIAGNOSIS — Z79.4 LONG TERM (CURRENT) USE OF INSULIN: ICD-10-CM

## 2025-06-03 DIAGNOSIS — I10 ESSENTIAL (PRIMARY) HYPERTENSION: ICD-10-CM

## 2025-06-03 LAB
ANION GAP SERPL CALC-SCNC: 9 MMOL/L — SIGNIFICANT CHANGE UP (ref 9–16)
APPEARANCE UR: CLEAR — SIGNIFICANT CHANGE UP
BILIRUB UR-MCNC: NEGATIVE — SIGNIFICANT CHANGE UP
BUN SERPL-MCNC: 33 MG/DL — HIGH (ref 7–23)
CALCIUM SERPL-MCNC: 9.3 MG/DL — SIGNIFICANT CHANGE UP (ref 8.5–10.5)
CHLORIDE SERPL-SCNC: 103 MMOL/L — SIGNIFICANT CHANGE UP (ref 96–108)
CO2 SERPL-SCNC: 25 MMOL/L — SIGNIFICANT CHANGE UP (ref 22–31)
COLOR SPEC: YELLOW — SIGNIFICANT CHANGE UP
CREAT SERPL-MCNC: 1.93 MG/DL — HIGH (ref 0.5–1.3)
DIFF PNL FLD: NEGATIVE — SIGNIFICANT CHANGE UP
EGFR: 30 ML/MIN/1.73M2 — LOW
EGFR: 30 ML/MIN/1.73M2 — LOW
GLUCOSE SERPL-MCNC: 129 MG/DL — HIGH (ref 70–99)
GLUCOSE UR QL: >=1000 MG/DL
HCT VFR BLD CALC: 26.4 % — LOW (ref 34.5–45)
HGB BLD-MCNC: 9 G/DL — LOW (ref 11.5–15.5)
KETONES UR QL: NEGATIVE MG/DL — SIGNIFICANT CHANGE UP
LEUKOCYTE ESTERASE UR-ACNC: NEGATIVE — SIGNIFICANT CHANGE UP
MCHC RBC-ENTMCNC: 28.5 PG — SIGNIFICANT CHANGE UP (ref 27–34)
MCHC RBC-ENTMCNC: 34.1 G/DL — SIGNIFICANT CHANGE UP (ref 32–36)
MCV RBC AUTO: 83.5 FL — SIGNIFICANT CHANGE UP (ref 80–100)
NITRITE UR-MCNC: NEGATIVE — SIGNIFICANT CHANGE UP
NRBC # BLD AUTO: 0 K/UL — SIGNIFICANT CHANGE UP (ref 0–0)
NRBC # FLD: 0 K/UL — SIGNIFICANT CHANGE UP (ref 0–0)
NRBC BLD AUTO-RTO: 0 /100 WBCS — SIGNIFICANT CHANGE UP (ref 0–0)
PH UR: 6 — SIGNIFICANT CHANGE UP (ref 5–8)
PLATELET # BLD AUTO: 320 K/UL — SIGNIFICANT CHANGE UP (ref 150–400)
PMV BLD: 9.8 FL — SIGNIFICANT CHANGE UP (ref 7–13)
POTASSIUM SERPL-MCNC: 3.7 MMOL/L — SIGNIFICANT CHANGE UP (ref 3.5–5.3)
POTASSIUM SERPL-SCNC: 3.7 MMOL/L — SIGNIFICANT CHANGE UP (ref 3.5–5.3)
PROT UR-MCNC: >=1000 MG/DL
RBC # BLD: 3.16 M/UL — LOW (ref 3.8–5.2)
RBC # FLD: 12.8 % — SIGNIFICANT CHANGE UP (ref 10.3–14.5)
SODIUM SERPL-SCNC: 137 MMOL/L — SIGNIFICANT CHANGE UP (ref 132–145)
SP GR SPEC: 1.02 — SIGNIFICANT CHANGE UP (ref 1–1.03)
UROBILINOGEN FLD QL: 0.2 MG/DL — SIGNIFICANT CHANGE UP (ref 0.2–1)
WBC # BLD: 6.3 K/UL — SIGNIFICANT CHANGE UP (ref 3.8–10.5)
WBC # FLD AUTO: 6.3 K/UL — SIGNIFICANT CHANGE UP (ref 3.8–10.5)

## 2025-06-03 RX ORDER — LABETALOL HYDROCHLORIDE 200 MG/1
1 TABLET, FILM COATED ORAL
Qty: 30 | Refills: 0
Start: 2025-06-03 | End: 2025-07-02

## 2025-06-03 RX ADMIN — Medication 20 MILLIGRAM(S): at 17:18

## 2025-06-03 RX ADMIN — Medication 25 MILLIGRAM(S): at 15:37

## 2025-06-03 NOTE — ED ADULT NURSE NOTE - OBJECTIVE STATEMENT
Pt is AA&O*4 in NAD. Pt c/o right facial numbness while at home. Pt presently denies facial numbness. Pt and  believe it was caused due to low glucose as pt ate after Sx started and felt better and did finger stick after eating (glucose 97). Pt  said it must have been lower prior to food. Pt has Hx of Diabetes and HTN. Pt states she last took Chlorthalidone 25mg in February 2025. Pt is placed on a cardiac monitor and continuous pulse ox. RN will continue to monitor pt and provide care as needed. Pt is AA&O*4 in NAD. Pt c/o right facial numbness while at home. Pt presently denies facial numbness. Pt and  believe it was caused due to low glucose as pt ate after Sx started and felt better and did finger stick after eating (glucose 97). Pt  said it must have been lower prior to food. Pt has Hx of Diabetes and HTN. Pt states she last took Chlorthalidone 25mg in February 2025. Pt has equal bilateral strength in upper and and lower extremities Pt is placed on a cardiac monitor and continuous pulse ox. RN will continue to monitor pt and provide care as needed.

## 2025-06-03 NOTE — ED PROVIDER NOTE - NSFOLLOWUPINSTRUCTIONS_ED_ALL_ED_FT
Please restart your blood pressure medication as prescribed.  We have also added a second blood pressure medicine to begin taking once daily.  Please follow-up with your primary care doctor within 1 week for further medication management.  High blood pressure over time is very dangerous to your kidney and cardiac health.  It is very important to take your medications as prescribed.

## 2025-06-03 NOTE — ED PROVIDER NOTE - PHYSICAL EXAMINATION
VITAL SIGNS: I have reviewed nursing notes and confirm.  CONSTITUTIONAL: Well-developed; well-nourished; in no acute distress.  HEAD: Normocephalic; atraumatic.  EYES: EOM intact; conjunctiva and sclera clear.  ENT: nose appears normal  NECK: Supple  CARD: S1, S2 normal; no murmurs, gallops, or rubs. Regular rate and rhythm.  RESP: No wheezes, rales or rhonchi.  ABD: soft; non-distended; non-tender  EXT: 1+ lower extremity pitting edema  PSYCH: Cooperative, appropriate.

## 2025-06-03 NOTE — ED ADULT TRIAGE NOTE - CHIEF COMPLAINT QUOTE
pt biba from home c/o bilateral feet swelling and hypertension; has not been taking BP meds and diuretics x6 months "because she didn't feel like it"; also c/o facial numbness for an unknown amount of time

## 2025-06-03 NOTE — ED PROVIDER NOTE - PATIENT PORTAL LINK FT
You can access the FollowMyHealth Patient Portal offered by [facility] by registering at the following website: [email or web address]. By joining [employer]’s FollowMyHealth portal, you will also be able to view your health information using other applications (apps) compatible with our system.

## 2025-06-03 NOTE — ED PROVIDER NOTE - CLINICAL SUMMARY MEDICAL DECISION MAKING FREE TEXT BOX
Lucia Little presented with transient right-sided facial numbness and dizziness, now resolved. The differential diagnosis includes transient ischemic attack, hypoglycemia, and hypertensive emergency. Given her history of diabetes and the reported low blood sugar before arrival, hypoglycemia is a likely contributor to her symptoms. However, her significantly elevated blood pressure (209/110 on arrival) also raises concern for a hypertensive emergency. Neurological examination was normal, with no residual deficits noted. Plan: 1) Check basic metabolic panel to assess current glucose control and rule out other metabolic abnormalities. 2) Obtain head CT to rule out acute intracranial pathology. 3) Continue to monitor blood pressure, which is currently trending down. 4) Review and adjust diabetes management, including insulin regimen, to prevent future hypoglycemic episodes. 5) Educate patient on signs and symptoms of hypoglycemia and hypertensive urgency, and when to seek medical attention. 6) Recommend follow-up with primary care physician within one week for blood pressure management and diabetes care. Lucia Little presented with transient right-sided facial numbness and dizziness, now resolved. The differential diagnosis includes hypoglycemia, and hypertensive emergency. Given her history of diabetes and the reported low blood sugar before arrival, hypoglycemia is a likely contributor to her symptoms. However, her significantly elevated blood pressure (209/110 on arrival) also raises concern for a hypertensive emergency. Neurological examination was normal, with no residual deficits noted. Plan: 1) Check basic metabolic panel to assess current glucose control and rule out other metabolic abnormalities and treat with hydralazine for BP control. 2) Continue to monitor blood pressure, which is currently trending down. 3) Educate patient on signs and symptoms of hypoglycemia and hypertensive urgency, and when to seek medical attention. 4) Recommend follow-up with primary care physician within one week for blood pressure management and diabetes care.

## 2025-06-03 NOTE — ED PROVIDER NOTE - OBJECTIVE STATEMENT
Lucia Little presented to the Emergency Room with a chief complaint of right-sided facial numbness and dizziness. The patient reports that the numbness was transient and had resolved by the time of examination. She also felt 'out of it' earlier in the day. The patient has a history of diabetes and takes insulin. Prior to arrival, her blood sugar was noted to be low, and she ate a banana to raise it. The patient denies any similar episodes in the past. She was brought to the ER by ambulance due to her symptoms.